# Patient Record
Sex: MALE | Race: OTHER | HISPANIC OR LATINO
[De-identification: names, ages, dates, MRNs, and addresses within clinical notes are randomized per-mention and may not be internally consistent; named-entity substitution may affect disease eponyms.]

---

## 2017-01-25 ENCOUNTER — APPOINTMENT (OUTPATIENT)
Dept: CARDIOLOGY | Facility: CLINIC | Age: 37
End: 2017-01-25

## 2017-03-29 ENCOUNTER — INPATIENT (INPATIENT)
Facility: HOSPITAL | Age: 37
LOS: 0 days | Discharge: HOME | DRG: 446 | End: 2017-03-30
Attending: HOSPITALIST | Admitting: HOSPITALIST
Payer: COMMERCIAL

## 2017-03-29 DIAGNOSIS — K76.0 FATTY (CHANGE OF) LIVER, NOT ELSEWHERE CLASSIFIED: ICD-10-CM

## 2017-03-29 DIAGNOSIS — M54.9 DORSALGIA, UNSPECIFIED: ICD-10-CM

## 2017-03-29 DIAGNOSIS — R10.9 UNSPECIFIED ABDOMINAL PAIN: ICD-10-CM

## 2017-03-29 DIAGNOSIS — K80.20 CALCULUS OF GALLBLADDER WITHOUT CHOLECYSTITIS WITHOUT OBSTRUCTION: ICD-10-CM

## 2017-03-29 DIAGNOSIS — Z88.8 ALLERGY STATUS TO OTHER DRUGS, MEDICAMENTS AND BIOLOGICAL SUBSTANCES: ICD-10-CM

## 2017-03-29 PROCEDURE — 76705 ECHO EXAM OF ABDOMEN: CPT | Mod: 26

## 2017-03-29 PROCEDURE — 99223 1ST HOSP IP/OBS HIGH 75: CPT

## 2017-03-30 PROCEDURE — 96376 TX/PRO/DX INJ SAME DRUG ADON: CPT

## 2017-03-30 PROCEDURE — 85027 COMPLETE CBC AUTOMATED: CPT

## 2017-03-30 PROCEDURE — 85610 PROTHROMBIN TIME: CPT

## 2017-03-30 PROCEDURE — 96365 THER/PROPH/DIAG IV INF INIT: CPT

## 2017-03-30 PROCEDURE — 80048 BASIC METABOLIC PNL TOTAL CA: CPT

## 2017-03-30 PROCEDURE — 96361 HYDRATE IV INFUSION ADD-ON: CPT

## 2017-03-30 PROCEDURE — 99239 HOSP IP/OBS DSCHRG MGMT >30: CPT

## 2017-03-30 PROCEDURE — 96375 TX/PRO/DX INJ NEW DRUG ADDON: CPT

## 2017-03-30 PROCEDURE — 85730 THROMBOPLASTIN TIME PARTIAL: CPT

## 2017-03-30 PROCEDURE — 99285 EMERGENCY DEPT VISIT HI MDM: CPT | Mod: 25

## 2017-03-30 PROCEDURE — 76705 ECHO EXAM OF ABDOMEN: CPT

## 2017-03-30 PROCEDURE — 80053 COMPREHEN METABOLIC PANEL: CPT

## 2017-03-30 PROCEDURE — 83690 ASSAY OF LIPASE: CPT

## 2017-10-02 ENCOUNTER — EMERGENCY (EMERGENCY)
Facility: HOSPITAL | Age: 37
LOS: 1 days | Discharge: HOME | End: 2017-10-02
Admitting: INTERNAL MEDICINE
Payer: COMMERCIAL

## 2017-10-02 DIAGNOSIS — K80.20 CALCULUS OF GALLBLADDER WITHOUT CHOLECYSTITIS WITHOUT OBSTRUCTION: ICD-10-CM

## 2017-10-02 DIAGNOSIS — R10.13 EPIGASTRIC PAIN: ICD-10-CM

## 2017-10-02 DIAGNOSIS — Z88.8 ALLERGY STATUS TO OTHER DRUGS, MEDICAMENTS AND BIOLOGICAL SUBSTANCES: ICD-10-CM

## 2017-10-02 PROCEDURE — 76705 ECHO EXAM OF ABDOMEN: CPT | Mod: 26

## 2017-10-02 PROCEDURE — 71010: CPT | Mod: 26

## 2017-10-02 PROCEDURE — 99285 EMERGENCY DEPT VISIT HI MDM: CPT

## 2017-10-03 PROCEDURE — 99284 EMERGENCY DEPT VISIT MOD MDM: CPT | Mod: 25

## 2017-10-03 PROCEDURE — 82150 ASSAY OF AMYLASE: CPT

## 2017-10-03 PROCEDURE — 85730 THROMBOPLASTIN TIME PARTIAL: CPT

## 2017-10-03 PROCEDURE — 80048 BASIC METABOLIC PNL TOTAL CA: CPT

## 2017-10-03 PROCEDURE — 83690 ASSAY OF LIPASE: CPT

## 2017-10-03 PROCEDURE — 71045 X-RAY EXAM CHEST 1 VIEW: CPT

## 2017-10-03 PROCEDURE — 85027 COMPLETE CBC AUTOMATED: CPT

## 2017-10-03 PROCEDURE — 96375 TX/PRO/DX INJ NEW DRUG ADDON: CPT

## 2017-10-03 PROCEDURE — 96374 THER/PROPH/DIAG INJ IV PUSH: CPT

## 2017-10-03 PROCEDURE — 80076 HEPATIC FUNCTION PANEL: CPT

## 2017-10-03 PROCEDURE — 85610 PROTHROMBIN TIME: CPT

## 2017-10-03 PROCEDURE — 76705 ECHO EXAM OF ABDOMEN: CPT

## 2018-05-22 ENCOUNTER — EMERGENCY (EMERGENCY)
Facility: HOSPITAL | Age: 38
LOS: 1 days | Discharge: HOME | End: 2018-05-22
Admitting: EMERGENCY MEDICINE
Payer: COMMERCIAL

## 2018-05-22 DIAGNOSIS — Z87.19 PERSONAL HISTORY OF OTHER DISEASES OF THE DIGESTIVE SYSTEM: ICD-10-CM

## 2018-05-22 DIAGNOSIS — K80.70 CALCULUS OF GALLBLADDER AND BILE DUCT WITHOUT CHOLECYSTITIS WITHOUT OBSTRUCTION: ICD-10-CM

## 2018-05-22 DIAGNOSIS — R10.11 RIGHT UPPER QUADRANT PAIN: ICD-10-CM

## 2018-05-22 PROCEDURE — 99284 EMERGENCY DEPT VISIT MOD MDM: CPT | Mod: 25

## 2018-05-22 PROCEDURE — 99285 EMERGENCY DEPT VISIT HI MDM: CPT

## 2018-05-22 PROCEDURE — 83690 ASSAY OF LIPASE: CPT

## 2018-05-22 PROCEDURE — 80076 HEPATIC FUNCTION PANEL: CPT

## 2018-05-22 PROCEDURE — 96374 THER/PROPH/DIAG INJ IV PUSH: CPT

## 2018-05-22 PROCEDURE — 96376 TX/PRO/DX INJ SAME DRUG ADON: CPT

## 2018-05-22 PROCEDURE — 82150 ASSAY OF AMYLASE: CPT

## 2018-05-22 PROCEDURE — 80048 BASIC METABOLIC PNL TOTAL CA: CPT

## 2018-05-22 PROCEDURE — 96375 TX/PRO/DX INJ NEW DRUG ADDON: CPT

## 2018-05-22 PROCEDURE — 76705 ECHO EXAM OF ABDOMEN: CPT | Mod: 26

## 2018-05-22 PROCEDURE — 76705 ECHO EXAM OF ABDOMEN: CPT

## 2018-05-22 PROCEDURE — 96361 HYDRATE IV INFUSION ADD-ON: CPT

## 2018-05-22 PROCEDURE — 85027 COMPLETE CBC AUTOMATED: CPT

## 2018-08-11 ENCOUNTER — EMERGENCY (EMERGENCY)
Facility: HOSPITAL | Age: 38
LOS: 1 days | Discharge: HOME | End: 2018-08-11
Attending: EMERGENCY MEDICINE | Admitting: EMERGENCY MEDICINE
Payer: SELF-PAY

## 2018-08-11 VITALS
HEART RATE: 77 BPM | OXYGEN SATURATION: 99 % | TEMPERATURE: 98 F | SYSTOLIC BLOOD PRESSURE: 164 MMHG | RESPIRATION RATE: 16 BRPM | DIASTOLIC BLOOD PRESSURE: 96 MMHG | WEIGHT: 199.96 LBS

## 2018-08-11 DIAGNOSIS — R10.9 UNSPECIFIED ABDOMINAL PAIN: ICD-10-CM

## 2018-08-11 LAB
ALBUMIN SERPL ELPH-MCNC: 3.6 G/DL — SIGNIFICANT CHANGE UP (ref 3.3–5)
ALP SERPL-CCNC: 140 U/L — HIGH (ref 40–120)
ALT FLD-CCNC: 46 U/L DA — HIGH (ref 10–45)
ANION GAP SERPL CALC-SCNC: 3 MMOL/L — LOW (ref 5–17)
AST SERPL-CCNC: 25 U/L — SIGNIFICANT CHANGE UP (ref 10–40)
BASOPHILS # BLD AUTO: 0.1 K/UL — SIGNIFICANT CHANGE UP (ref 0–0.2)
BASOPHILS NFR BLD AUTO: 0.8 % — SIGNIFICANT CHANGE UP (ref 0–2)
BILIRUB SERPL-MCNC: 0.1 MG/DL — LOW (ref 0.2–1.2)
BUN SERPL-MCNC: 17 MG/DL — SIGNIFICANT CHANGE UP (ref 7–23)
CALCIUM SERPL-MCNC: 8.5 MG/DL — SIGNIFICANT CHANGE UP (ref 8.4–10.5)
CHLORIDE SERPL-SCNC: 105 MMOL/L — SIGNIFICANT CHANGE UP (ref 96–108)
CO2 SERPL-SCNC: 27 MMOL/L — SIGNIFICANT CHANGE UP (ref 22–31)
CREAT SERPL-MCNC: 0.68 MG/DL — SIGNIFICANT CHANGE UP (ref 0.5–1.3)
EOSINOPHIL # BLD AUTO: 0.2 K/UL — SIGNIFICANT CHANGE UP (ref 0–0.5)
EOSINOPHIL NFR BLD AUTO: 3.2 % — SIGNIFICANT CHANGE UP (ref 0–6)
GLUCOSE SERPL-MCNC: 162 MG/DL — HIGH (ref 70–99)
HCT VFR BLD CALC: 42.7 % — SIGNIFICANT CHANGE UP (ref 39–50)
HGB BLD-MCNC: 13.6 G/DL — SIGNIFICANT CHANGE UP (ref 13–17)
LIDOCAIN IGE QN: 120 U/L — SIGNIFICANT CHANGE UP (ref 73–393)
LYMPHOCYTES # BLD AUTO: 1.7 K/UL — SIGNIFICANT CHANGE UP (ref 1–3.3)
LYMPHOCYTES # BLD AUTO: 21.4 % — SIGNIFICANT CHANGE UP (ref 13–44)
MCHC RBC-ENTMCNC: 27.1 PG — SIGNIFICANT CHANGE UP (ref 27–34)
MCHC RBC-ENTMCNC: 32 GM/DL — SIGNIFICANT CHANGE UP (ref 32–36)
MCV RBC AUTO: 84.7 FL — SIGNIFICANT CHANGE UP (ref 80–100)
MONOCYTES # BLD AUTO: 0.5 K/UL — SIGNIFICANT CHANGE UP (ref 0–0.9)
MONOCYTES NFR BLD AUTO: 6.5 % — SIGNIFICANT CHANGE UP (ref 1–9)
NEUTROPHILS # BLD AUTO: 5.3 K/UL — SIGNIFICANT CHANGE UP (ref 1.8–7.4)
NEUTROPHILS NFR BLD AUTO: 68 % — SIGNIFICANT CHANGE UP (ref 43–77)
PLATELET # BLD AUTO: 300 K/UL — SIGNIFICANT CHANGE UP (ref 150–400)
POTASSIUM SERPL-MCNC: 3.9 MMOL/L — SIGNIFICANT CHANGE UP (ref 3.5–5.3)
POTASSIUM SERPL-SCNC: 3.9 MMOL/L — SIGNIFICANT CHANGE UP (ref 3.5–5.3)
PROT SERPL-MCNC: 8.2 G/DL — SIGNIFICANT CHANGE UP (ref 6–8.3)
RBC # BLD: 5.04 M/UL — SIGNIFICANT CHANGE UP (ref 4.2–5.8)
RBC # FLD: 12.9 % — SIGNIFICANT CHANGE UP (ref 10.3–14.5)
SODIUM SERPL-SCNC: 135 MMOL/L — SIGNIFICANT CHANGE UP (ref 135–145)
WBC # BLD: 7.8 K/UL — SIGNIFICANT CHANGE UP (ref 3.8–10.5)
WBC # FLD AUTO: 7.8 K/UL — SIGNIFICANT CHANGE UP (ref 3.8–10.5)

## 2018-08-11 PROCEDURE — 99285 EMERGENCY DEPT VISIT HI MDM: CPT

## 2018-08-11 PROCEDURE — 76705 ECHO EXAM OF ABDOMEN: CPT | Mod: 26

## 2018-08-11 RX ORDER — ONDANSETRON 8 MG/1
4 TABLET, FILM COATED ORAL ONCE
Qty: 0 | Refills: 0 | Status: COMPLETED | OUTPATIENT
Start: 2018-08-11 | End: 2018-08-11

## 2018-08-11 RX ORDER — HYDROMORPHONE HYDROCHLORIDE 2 MG/ML
1 INJECTION INTRAMUSCULAR; INTRAVENOUS; SUBCUTANEOUS ONCE
Qty: 0 | Refills: 0 | Status: DISCONTINUED | OUTPATIENT
Start: 2018-08-11 | End: 2018-08-11

## 2018-08-11 RX ORDER — SODIUM CHLORIDE 9 MG/ML
1000 INJECTION INTRAMUSCULAR; INTRAVENOUS; SUBCUTANEOUS ONCE
Qty: 0 | Refills: 0 | Status: COMPLETED | OUTPATIENT
Start: 2018-08-11 | End: 2018-08-11

## 2018-08-11 RX ORDER — MORPHINE SULFATE 50 MG/1
4 CAPSULE, EXTENDED RELEASE ORAL ONCE
Qty: 0 | Refills: 0 | Status: DISCONTINUED | OUTPATIENT
Start: 2018-08-11 | End: 2018-08-11

## 2018-08-11 RX ORDER — FAMOTIDINE 10 MG/ML
20 INJECTION INTRAVENOUS DAILY
Qty: 0 | Refills: 0 | Status: DISCONTINUED | OUTPATIENT
Start: 2018-08-11 | End: 2018-08-15

## 2018-08-11 RX ORDER — SODIUM CHLORIDE 9 MG/ML
3 INJECTION INTRAMUSCULAR; INTRAVENOUS; SUBCUTANEOUS ONCE
Qty: 0 | Refills: 0 | Status: COMPLETED | OUTPATIENT
Start: 2018-08-11 | End: 2018-08-11

## 2018-08-11 RX ADMIN — MORPHINE SULFATE 4 MILLIGRAM(S): 50 CAPSULE, EXTENDED RELEASE ORAL at 20:18

## 2018-08-11 RX ADMIN — ONDANSETRON 104 MILLIGRAM(S): 8 TABLET, FILM COATED ORAL at 20:23

## 2018-08-11 RX ADMIN — HYDROMORPHONE HYDROCHLORIDE 1 MILLIGRAM(S): 2 INJECTION INTRAMUSCULAR; INTRAVENOUS; SUBCUTANEOUS at 22:45

## 2018-08-11 RX ADMIN — SODIUM CHLORIDE 1000 MILLILITER(S): 9 INJECTION INTRAMUSCULAR; INTRAVENOUS; SUBCUTANEOUS at 21:02

## 2018-08-11 RX ADMIN — ONDANSETRON 4 MILLIGRAM(S): 8 TABLET, FILM COATED ORAL at 20:33

## 2018-08-11 RX ADMIN — HYDROMORPHONE HYDROCHLORIDE 1 MILLIGRAM(S): 2 INJECTION INTRAMUSCULAR; INTRAVENOUS; SUBCUTANEOUS at 23:15

## 2018-08-11 RX ADMIN — MORPHINE SULFATE 4 MILLIGRAM(S): 50 CAPSULE, EXTENDED RELEASE ORAL at 20:48

## 2018-08-11 RX ADMIN — MORPHINE SULFATE 4 MILLIGRAM(S): 50 CAPSULE, EXTENDED RELEASE ORAL at 22:05

## 2018-08-11 RX ADMIN — MORPHINE SULFATE 4 MILLIGRAM(S): 50 CAPSULE, EXTENDED RELEASE ORAL at 21:35

## 2018-08-11 RX ADMIN — SODIUM CHLORIDE 3 MILLILITER(S): 9 INJECTION INTRAMUSCULAR; INTRAVENOUS; SUBCUTANEOUS at 20:01

## 2018-08-11 RX ADMIN — FAMOTIDINE 100 MILLIGRAM(S): 10 INJECTION INTRAVENOUS at 20:20

## 2018-08-11 RX ADMIN — FAMOTIDINE 20 MILLIGRAM(S): 10 INJECTION INTRAVENOUS at 20:40

## 2018-08-11 RX ADMIN — SODIUM CHLORIDE 500 MILLILITER(S): 9 INJECTION INTRAMUSCULAR; INTRAVENOUS; SUBCUTANEOUS at 20:02

## 2018-08-11 NOTE — ED ADULT NURSE NOTE - OBJECTIVE STATEMENT
Pt c/o upper abd pain for 2-3 hours PTA s/p eating fried eggs and rice. Pt states that he has had this pain in the past and was told that he needs to get his gallbladder removed. Pt also c/o vomiting after taking a percacet for the pain.

## 2018-08-11 NOTE — ED ADULT NURSE NOTE - NSIMPLEMENTINTERV_GEN_ALL_ED
Implemented All Universal Safety Interventions:  [place] to call system. Call bell, personal items and telephone within reach. Instruct patient to call for assistance. Room bathroom lighting operational. Non-slip footwear when patient is off stretcher. Physically safe environment: no spills, clutter or unnecessary equipment. Stretcher in lowest position, wheels locked, appropriate side rails in place.

## 2018-08-12 ENCOUNTER — TRANSCRIPTION ENCOUNTER (OUTPATIENT)
Age: 38
End: 2018-08-12

## 2018-08-12 ENCOUNTER — INPATIENT (INPATIENT)
Facility: HOSPITAL | Age: 38
LOS: 5 days | Discharge: HOME | DRG: 872 | End: 2018-08-18
Attending: INTERNAL MEDICINE | Admitting: INTERNAL MEDICINE
Payer: COMMERCIAL

## 2018-08-12 VITALS
RESPIRATION RATE: 17 BRPM | OXYGEN SATURATION: 96 % | SYSTOLIC BLOOD PRESSURE: 125 MMHG | HEART RATE: 106 BPM | WEIGHT: 227.08 LBS | TEMPERATURE: 103 F | DIASTOLIC BLOOD PRESSURE: 73 MMHG

## 2018-08-12 VITALS
HEART RATE: 54 BPM | RESPIRATION RATE: 16 BRPM | OXYGEN SATURATION: 99 % | DIASTOLIC BLOOD PRESSURE: 80 MMHG | SYSTOLIC BLOOD PRESSURE: 120 MMHG

## 2018-08-12 DIAGNOSIS — K80.20 CALCULUS OF GALLBLADDER WITHOUT CHOLECYSTITIS WITHOUT OBSTRUCTION: ICD-10-CM

## 2018-08-12 DIAGNOSIS — K80.10 CALCULUS OF GALLBLADDER WITH CHRONIC CHOLECYSTITIS WITHOUT OBSTRUCTION: ICD-10-CM

## 2018-08-12 LAB
ALBUMIN SERPL ELPH-MCNC: 3.6 G/DL — SIGNIFICANT CHANGE UP (ref 3.3–5)
ALP SERPL-CCNC: 123 U/L — HIGH (ref 40–120)
ALT FLD-CCNC: 57 U/L DA — HIGH (ref 10–45)
ANION GAP SERPL CALC-SCNC: 10 MMOL/L — SIGNIFICANT CHANGE UP (ref 5–17)
APPEARANCE UR: CLEAR — SIGNIFICANT CHANGE UP
APTT BLD: 27.8 SEC — SIGNIFICANT CHANGE UP (ref 27.5–37.4)
AST SERPL-CCNC: 32 U/L — SIGNIFICANT CHANGE UP (ref 10–40)
BACTERIA # UR AUTO: NEGATIVE /HPF — SIGNIFICANT CHANGE UP
BASOPHILS # BLD AUTO: 0 K/UL — SIGNIFICANT CHANGE UP (ref 0–0.2)
BASOPHILS NFR BLD AUTO: 0.3 % — SIGNIFICANT CHANGE UP (ref 0–2)
BILIRUB SERPL-MCNC: 0.7 MG/DL — SIGNIFICANT CHANGE UP (ref 0.2–1.2)
BILIRUB UR-MCNC: NEGATIVE — SIGNIFICANT CHANGE UP
BUN SERPL-MCNC: 14 MG/DL — SIGNIFICANT CHANGE UP (ref 7–23)
CALCIUM SERPL-MCNC: 9 MG/DL — SIGNIFICANT CHANGE UP (ref 8.4–10.5)
CHLORIDE SERPL-SCNC: 99 MMOL/L — SIGNIFICANT CHANGE UP (ref 96–108)
CO2 SERPL-SCNC: 27 MMOL/L — SIGNIFICANT CHANGE UP (ref 22–31)
COLOR SPEC: YELLOW — SIGNIFICANT CHANGE UP
CREAT SERPL-MCNC: 0.7 MG/DL — SIGNIFICANT CHANGE UP (ref 0.5–1.3)
DIFF PNL FLD: ABNORMAL
EOSINOPHIL # BLD AUTO: 0 K/UL — SIGNIFICANT CHANGE UP (ref 0–0.5)
EOSINOPHIL NFR BLD AUTO: 0.1 % — SIGNIFICANT CHANGE UP (ref 0–6)
EPI CELLS # UR: SIGNIFICANT CHANGE UP
GLUCOSE SERPL-MCNC: 109 MG/DL — HIGH (ref 70–99)
GLUCOSE UR QL: NEGATIVE — SIGNIFICANT CHANGE UP
HCT VFR BLD CALC: 42.7 % — SIGNIFICANT CHANGE UP (ref 39–50)
HGB BLD-MCNC: 14 G/DL — SIGNIFICANT CHANGE UP (ref 13–17)
INR BLD: 1.19 RATIO — HIGH (ref 0.88–1.16)
KETONES UR-MCNC: NEGATIVE — SIGNIFICANT CHANGE UP
LACTATE SERPL-SCNC: 2 MMOL/L — SIGNIFICANT CHANGE UP (ref 0.7–2)
LEUKOCYTE ESTERASE UR-ACNC: NEGATIVE — SIGNIFICANT CHANGE UP
LIDOCAIN IGE QN: 74 U/L — SIGNIFICANT CHANGE UP (ref 73–393)
LYMPHOCYTES # BLD AUTO: 0.4 K/UL — LOW (ref 1–3.3)
LYMPHOCYTES # BLD AUTO: 3.1 % — LOW (ref 13–44)
MCHC RBC-ENTMCNC: 27.5 PG — SIGNIFICANT CHANGE UP (ref 27–34)
MCHC RBC-ENTMCNC: 32.9 GM/DL — SIGNIFICANT CHANGE UP (ref 32–36)
MCV RBC AUTO: 83.4 FL — SIGNIFICANT CHANGE UP (ref 80–100)
MONOCYTES # BLD AUTO: 0.1 K/UL — SIGNIFICANT CHANGE UP (ref 0–0.9)
MONOCYTES NFR BLD AUTO: 1.1 % — SIGNIFICANT CHANGE UP (ref 1–9)
NEUTROPHILS # BLD AUTO: 13 K/UL — HIGH (ref 1.8–7.4)
NEUTROPHILS NFR BLD AUTO: 95.4 % — HIGH (ref 43–77)
NITRITE UR-MCNC: NEGATIVE — SIGNIFICANT CHANGE UP
PH UR: 6 — SIGNIFICANT CHANGE UP (ref 5–8)
PLATELET # BLD AUTO: 283 K/UL — SIGNIFICANT CHANGE UP (ref 150–400)
POTASSIUM SERPL-MCNC: 3.6 MMOL/L — SIGNIFICANT CHANGE UP (ref 3.5–5.3)
POTASSIUM SERPL-SCNC: 3.6 MMOL/L — SIGNIFICANT CHANGE UP (ref 3.5–5.3)
PROT SERPL-MCNC: 8.6 G/DL — HIGH (ref 6–8.3)
PROT UR-MCNC: 30 MG/DL
PROTHROM AB SERPL-ACNC: 13.2 SEC — HIGH (ref 9.8–12.7)
RBC # BLD: 5.11 M/UL — SIGNIFICANT CHANGE UP (ref 4.2–5.8)
RBC # FLD: 12.5 % — SIGNIFICANT CHANGE UP (ref 10.3–14.5)
RBC CASTS # UR COMP ASSIST: SIGNIFICANT CHANGE UP /HPF (ref 0–4)
SODIUM SERPL-SCNC: 136 MMOL/L — SIGNIFICANT CHANGE UP (ref 135–145)
SP GR SPEC: 1 — LOW (ref 1.01–1.02)
UROBILINOGEN FLD QL: NEGATIVE — SIGNIFICANT CHANGE UP
WBC # BLD: 13.6 K/UL — HIGH (ref 3.8–10.5)
WBC # FLD AUTO: 13.6 K/UL — HIGH (ref 3.8–10.5)
WBC UR QL: SIGNIFICANT CHANGE UP /HPF (ref 0–5)

## 2018-08-12 PROCEDURE — 76705 ECHO EXAM OF ABDOMEN: CPT

## 2018-08-12 PROCEDURE — 96376 TX/PRO/DX INJ SAME DRUG ADON: CPT

## 2018-08-12 PROCEDURE — 80053 COMPREHEN METABOLIC PANEL: CPT

## 2018-08-12 PROCEDURE — 96375 TX/PRO/DX INJ NEW DRUG ADDON: CPT

## 2018-08-12 PROCEDURE — 85027 COMPLETE CBC AUTOMATED: CPT

## 2018-08-12 PROCEDURE — 93010 ELECTROCARDIOGRAM REPORT: CPT

## 2018-08-12 PROCEDURE — 74177 CT ABD & PELVIS W/CONTRAST: CPT | Mod: 26

## 2018-08-12 PROCEDURE — 96365 THER/PROPH/DIAG IV INF INIT: CPT | Mod: XU

## 2018-08-12 PROCEDURE — 99285 EMERGENCY DEPT VISIT HI MDM: CPT

## 2018-08-12 PROCEDURE — 99223 1ST HOSP IP/OBS HIGH 75: CPT

## 2018-08-12 PROCEDURE — 74177 CT ABD & PELVIS W/CONTRAST: CPT

## 2018-08-12 PROCEDURE — 83690 ASSAY OF LIPASE: CPT

## 2018-08-12 PROCEDURE — 71045 X-RAY EXAM CHEST 1 VIEW: CPT | Mod: 26

## 2018-08-12 PROCEDURE — 96361 HYDRATE IV INFUSION ADD-ON: CPT

## 2018-08-12 PROCEDURE — 99284 EMERGENCY DEPT VISIT MOD MDM: CPT | Mod: 25

## 2018-08-12 RX ORDER — ACETAMINOPHEN 500 MG
650 TABLET ORAL ONCE
Qty: 0 | Refills: 0 | Status: COMPLETED | OUTPATIENT
Start: 2018-08-12 | End: 2018-08-12

## 2018-08-12 RX ORDER — PIPERACILLIN AND TAZOBACTAM 4; .5 G/20ML; G/20ML
3.38 INJECTION, POWDER, LYOPHILIZED, FOR SOLUTION INTRAVENOUS ONCE
Qty: 0 | Refills: 0 | Status: COMPLETED | OUTPATIENT
Start: 2018-08-12 | End: 2018-08-12

## 2018-08-12 RX ORDER — SODIUM CHLORIDE 9 MG/ML
3100 INJECTION INTRAMUSCULAR; INTRAVENOUS; SUBCUTANEOUS ONCE
Qty: 0 | Refills: 0 | Status: COMPLETED | OUTPATIENT
Start: 2018-08-12 | End: 2018-08-12

## 2018-08-12 RX ORDER — PIPERACILLIN AND TAZOBACTAM 4; .5 G/20ML; G/20ML
3.38 INJECTION, POWDER, LYOPHILIZED, FOR SOLUTION INTRAVENOUS EVERY 8 HOURS
Qty: 0 | Refills: 0 | Status: DISCONTINUED | OUTPATIENT
Start: 2018-08-12 | End: 2018-08-14

## 2018-08-12 RX ORDER — SODIUM CHLORIDE 9 MG/ML
1000 INJECTION INTRAMUSCULAR; INTRAVENOUS; SUBCUTANEOUS
Qty: 0 | Refills: 0 | Status: DISCONTINUED | OUTPATIENT
Start: 2018-08-12 | End: 2018-08-14

## 2018-08-12 RX ORDER — MORPHINE SULFATE 50 MG/1
4 CAPSULE, EXTENDED RELEASE ORAL ONCE
Qty: 0 | Refills: 0 | Status: DISCONTINUED | OUTPATIENT
Start: 2018-08-12 | End: 2018-08-12

## 2018-08-12 RX ORDER — ACETAMINOPHEN 500 MG
650 TABLET ORAL EVERY 6 HOURS
Qty: 0 | Refills: 0 | Status: DISCONTINUED | OUTPATIENT
Start: 2018-08-12 | End: 2018-08-18

## 2018-08-12 RX ADMIN — SODIUM CHLORIDE 3100 MILLILITER(S): 9 INJECTION INTRAMUSCULAR; INTRAVENOUS; SUBCUTANEOUS at 21:40

## 2018-08-12 RX ADMIN — PIPERACILLIN AND TAZOBACTAM 3.38 GRAM(S): 4; .5 INJECTION, POWDER, LYOPHILIZED, FOR SOLUTION INTRAVENOUS at 20:30

## 2018-08-12 RX ADMIN — MORPHINE SULFATE 4 MILLIGRAM(S): 50 CAPSULE, EXTENDED RELEASE ORAL at 07:42

## 2018-08-12 RX ADMIN — PIPERACILLIN AND TAZOBACTAM 200 GRAM(S): 4; .5 INJECTION, POWDER, LYOPHILIZED, FOR SOLUTION INTRAVENOUS at 20:16

## 2018-08-12 RX ADMIN — SODIUM CHLORIDE 3100 MILLILITER(S): 9 INJECTION INTRAMUSCULAR; INTRAVENOUS; SUBCUTANEOUS at 20:10

## 2018-08-12 RX ADMIN — Medication 650 MILLIGRAM(S): at 20:15

## 2018-08-12 NOTE — H&P ADULT - PROBLEM SELECTOR PROBLEM 1
Calculus of gallbladder with cholecystitis without biliary obstruction, unspecified cholecystitis acuity

## 2018-08-12 NOTE — ED PROVIDER NOTE - MEDICAL DECISION MAKING DETAILS
Pt with persistent epigastric pain with nausea with a history of biliary colic.  Pt given the option of being admitted and declined Dr. Garth Patel to see  Pt told to return for fever or increasing pain

## 2018-08-12 NOTE — H&P ADULT - HISTORY OF PRESENT ILLNESS
38M hx of cholelithiasis pw sxs of biliary colic yesterday and was in ED and discharged this am. He developed abd pain and decreased appetite and vomiting started yesterday afternoon. In ED had workup with abd U/S and CT abd with fatty liver and cholelithiasis. Sxs improved and pt was discharged. After D/C home, no further abd pain but developed shaking chills and fever and came back to ED. Denied any new cough, NVD or dysuria.

## 2018-08-12 NOTE — H&P ADULT - NSHPPHYSICALEXAM_GEN_ALL_CORE
Vital Signs Last 24 Hrs  T(C): 37.9 (12 Aug 2018 21:35), Max: 39.2 (12 Aug 2018 19:43)  T(F): 100.2 (12 Aug 2018 21:35), Max: 102.5 (12 Aug 2018 19:43)  HR: 101 (12 Aug 2018 21:35) (50 - 106)  BP: 124/74 (12 Aug 2018 21:35) (120/80 - 125/73)  BP(mean): --  RR: 17 (12 Aug 2018 21:35) (16 - 18)  SpO2: 95% (12 Aug 2018 21:35) (95% - 100%)  Daily     Daily   CAPILLARY BLOOD GLUCOSE        I&O's Summary      GENERAL: NAD  HEAD:  Normocephalic  EYES: EOMI, PERRLA, conjunctiva and sclera clear  ENMT: No tonsillar erythema, exudates, or enlargement; Moist mucous membranes, No lesions  NECK: Supple, No JVD, no bruit, normal thyroid  NERVOUS SYSTEM:  Alert & Oriented X3, Good concentration; grossly  Motor Strength 5/5 B/L upper and lower extremities; DTRs 2+ intact and symmetric  CHEST/LUNG: Clear to auscultation bilaterally; No rales, rhonchi, wheezing, or rubs  HEART: Regular rate and rhythm; No murmurs, rubs, or gallops  ABDOMEN: Soft, Nontender, Nondistended; Bowel sounds present  EXTREMITIES:  2+ Peripheral Pulses, No clubbing, cyanosis, or edema  LYMPH: No lymphadenopathy noted  SKIN: No rashes or lesions

## 2018-08-12 NOTE — H&P ADULT - NSHPREVIEWOFSYSTEMS_GEN_ALL_CORE
CONSTITUTIONAL: No fever, weight loss, or fatigue  EYES: No eye pain, visual disturbances, or discharge  ENMT:  No difficulty hearing, tinnitus, vertigo; No sinus or throat pain  NECK: No pain or stiffness  RESPIRATORY: No cough, wheezing, chills or hemoptysis; No shortness of breath  CARDIOVASCULAR: No chest pain, palpitations, dizziness, or leg swelling  GASTROINTESTINAL: No abdominal or epigastric pain. No nausea, vomiting, or hematemesis; No diarrhea or constipation. No melena or hematochezia.  GENITOURINARY: No dysuria, frequency, hematuria, or incontinence  NEUROLOGICAL: No headaches, memory loss, loss of strength, numbness, or tremors  SKIN: No itching, burning, rashes, or lesions   LYMPH NODES: No enlarged glands  ENDOCRINE: No heat or cold intolerance; No hair loss  MUSCULOSKELETAL: No joint pain or swelling; No muscle, back, or extremity pain  PSYCHIATRIC: No depression, anxiety, mood swings, or difficulty sleeping  HEME/LYMPH: No easy bruising, or bleeding gums  ALLERY AND IMMUNOLOGIC: No hives or eczema    IMPROVE VTE Individual Risk Assessment          RISK                                                          Points  [  ] Previous VTE                                                3  [  ] Thrombophilia                                             2  [  ] Lower limb paralysis                                   2        (unable to hold up >15 seconds)    [  ] Current Cancer                                             2         (within 6 months)  [  ] Immobilization > 24 hrs                              1  [  ] ICU/CCU stay > 24 hours                             1  [  ] Age > 60                                                         1    IMPROVE VTE Score:         [     0    ]    Total Risk Factor Score:    0 - 1:   Consider IPC  >2 - 3:  Thromboprophylaxis required (enoxaparin or SQ heparin)        >4:   High Risk: Thromboprophylaxis required (enoxaparin or SQ heparin), optional add IPC  **If CONTRAINDICATION to enoxaparin or SQ heparin, USE IPCs**

## 2018-08-12 NOTE — H&P ADULT - NSHPLABSRESULTS_GEN_ALL_CORE
14.0   13.6  )-----------( 283      ( 12 Aug 2018 20:14 )             42.7           136  |  99  |  14  ----------------------------<  109<H>  3.6   |  27  |  0.70    Ca    9.0      12 Aug 2018 20:14    TPro  8.6<H>  /  Alb  3.6  /  TBili  0.7  /  DBili  x   /  AST  32  /  ALT  57<H>  /  AlkPhos  123<H>      Lactate, Blood: 2.0 mmol/L ( @ 20:00)       LIVER FUNCTIONS - ( 12 Aug 2018 20:14 )  Alb: 3.6 g/dL / Pro: 8.6 g/dL / ALK PHOS: 123 U/L / ALT: 57 U/L DA / AST: 32 U/L / GGT: x             Lipase, Serum: 74 U/L (18 @ 20:14)  Lipase, Serum: 120 U/L (18 @ 20:07)    PT/INR - ( 12 Aug 2018 20:14 )   PT: 13.2 sec;   INR: 1.19 ratio         PTT - ( 12 Aug 2018 20:14 )  PTT:27.8 sec          Urinalysis Basic - ( 12 Aug 2018 21:13 )    Color: Yellow / Appearance: Clear / S.005 / pH: x  Gluc: x / Ketone: Negative  / Bili: Negative / Urobili: Negative   Blood: x / Protein: 30 mg/dL / Nitrite: Negative   Leuk Esterase: Negative / RBC: 0-4 /HPF / WBC 0-2 /HPF   Sq Epi: x / Non Sq Epi: Neg.-Few / Bacteria: Negative /HPF      EKG:      CXR: 14.0   13.6  )-----------( 283      ( 12 Aug 2018 20:14 )             42.7           136  |  99  |  14  ----------------------------<  109<H>  3.6   |  27  |  0.70    Ca    9.0      12 Aug 2018 20:14    TPro  8.6<H>  /  Alb  3.6  /  TBili  0.7  /  DBili  x   /  AST  32  /  ALT  57<H>  /  AlkPhos  123<H>      Lactate, Blood: 2.0 mmol/L ( @ 20:00)       LIVER FUNCTIONS - ( 12 Aug 2018 20:14 )  Alb: 3.6 g/dL / Pro: 8.6 g/dL / ALK PHOS: 123 U/L / ALT: 57 U/L DA / AST: 32 U/L / GGT: x             Lipase, Serum: 74 U/L (18 @ 20:14)  Lipase, Serum: 120 U/L (18 @ 20:07)    PT/INR - ( 12 Aug 2018 20:14 )   PT: 13.2 sec;   INR: 1.19 ratio         PTT - ( 12 Aug 2018 20:14 )  PTT:27.8 sec          Urinalysis Basic - ( 12 Aug 2018 21:13 )    Color: Yellow / Appearance: Clear / S.005 / pH: x  Gluc: x / Ketone: Negative  / Bili: Negative / Urobili: Negative   Blood: x / Protein: 30 mg/dL / Nitrite: Negative   Leuk Esterase: Negative / RBC: 0-4 /HPF / WBC 0-2 /HPF   Sq Epi: x / Non Sq Epi: Neg.-Few / Bacteria: Negative /HPF    < from: CT Abdomen and Pelvis w/ Oral Cont and w/ IV Cont (18 @ 03:23) >    FINDINGS:    The heart and lung bases are unremarkable.    The spleen, pancreas, gallbladder and biliary tree are unremarkable. The   liver is normal in morphology. There is diffuse hepatic steatosis. The   portal vein is patent.    The kidneys enhance symmetrically without hydronephrosis. The adrenal   glands are unremarkable.    There is no bowel obstruction or ascites. The appendix is normal.    The urinary bladder is unremarkable. There is no pelvic mass.    There is no abdominal or pelvic lymphadenopathy.    The aorta and IVC are unremarkable.    The visualized osseous structures are within normal limits.      IMPRESSION:    No evidence of small bowel obstruction or active bowel inflammation.    < end of copied text >    < from: US Gallbladder (18 @ 22:26) >    Limitedexam due to body habitus.    The liver is enlarged measuring 20.5 cm. Possible steatosis.    The gallbladder again demonstrates multiple stones measuring up to 1.9   cm. No wall thickening or pericholecystic fluid is seen. Negative   sonographic Robertson sign.    CBD measures 4 mm.    The pancreas is not well seen.    The right kidney measures 12 cm.    No ascites.    Unremarkable aorta    IMPRESSION:    Gallstones. No wall thickening. If symptoms continue, consider HIDA scan.      < end of copied text >    CXR: pending

## 2018-08-12 NOTE — CHART NOTE - NSCHARTNOTEFT_GEN_A_CORE
Asked by Dr. Roa to evaluate patient for possible admission/CDU.    HPI: 38M with PMH obesity presents for abdominal pain/epigastric.  Patient states association with eating.  States he was told in the past he has gallstones and needs his gallbladder removed.  Workup in ED remarkable and reviewed with patient and his family member.  I explained that patient has likely GERD and needs follow up with his primary care physician and possibly surgery.  I advised him to take the prilosec everyday and if he has continued symptoms to see Dr. Stringer for referral to surgery and possibly GI.  I also advised him to eat less fatty foods.    Patient noted understanding and to be discharged by ED attending.

## 2018-08-12 NOTE — H&P ADULT - PMH
Calculus of gallbladder with cholecystitis without biliary obstruction, unspecified cholecystitis acuity    CHRISTINE (obstructive sleep apnea)

## 2018-08-12 NOTE — ED PROVIDER NOTE - OBJECTIVE STATEMENT
38 year old M seen by myself yesterday with probable biliary colic and presents today with shaking chills and decreased abdominal pain. 38 year old M seen by myself yesterday with probable biliary colic , presents today with shaking chills and decreased abdominal pain.

## 2018-08-12 NOTE — ED ADULT NURSE NOTE - NSIMPLEMENTINTERV_GEN_ALL_ED
Implemented All Universal Safety Interventions:  [place] to call system. Call bell, personal items and telephone within reach. Instruct patient to call for assistance. Room bathroom lighting operational. Non-slip footwear when patient is off stretcher. Physically safe environment: no spills, clutter or unnecessary equipment. Stretcher in lowest position, wheels locked, appropriate side rails in place.

## 2018-08-12 NOTE — ED PROVIDER NOTE - MEDICAL DECISION MAKING DETAILS
pt seen by myself yesterday diagnosed with probable biliary colic pt presents today with acute fever and chills zosyn given case discussed with Dr. Patel

## 2018-08-12 NOTE — H&P ADULT - PROBLEM SELECTOR PLAN 1
cont IV Zosyn.   surgery consult.  HIDA scan in AM  IVFs, NPO with fever and sepsis (+fever, tachy, +wbc)  cont IV Zosyn.   surgery consult.  HIDA scan in AM  IVFs, NPO

## 2018-08-12 NOTE — ED ADULT NURSE NOTE - OBJECTIVE STATEMENT
pt presents complaining of fever and chills. Denies pain. Pt seen yesterday for epigastric pain and discharge home stable. Pt complaining of SOB. Pt speaking in full sentences with no SOB noted.

## 2018-08-12 NOTE — ED PROVIDER NOTE - OBJECTIVE STATEMENT
38 year old M with a history of biliary colic presents with severe epigastric pain after eating eggs.  Pt denies chest pain or shortness of breath.

## 2018-08-12 NOTE — H&P ADULT - ATTENDING COMMENTS
jessie(surgical attending)    I personally reviewed all imaging and lab results and examined patient.  he has sympotmatic cholelithiasis and acute cholecystitis.    I discussed planned laparoscopy including risks and benefits with patient.    dr lanie valdez

## 2018-08-12 NOTE — ED ADULT TRIAGE NOTE - CHIEF COMPLAINT QUOTE
diagnosed with Gallstones in ED yesterday today presents to fever and chills  denies abd pain nausea and vomiting

## 2018-08-13 DIAGNOSIS — A41.9 SEPSIS, UNSPECIFIED ORGANISM: ICD-10-CM

## 2018-08-13 PROBLEM — K80.50 CALCULUS OF BILE DUCT WITHOUT CHOLANGITIS OR CHOLECYSTITIS WITHOUT OBSTRUCTION: Chronic | Status: INACTIVE | Noted: 2018-08-11 | Resolved: 2018-08-12

## 2018-08-13 LAB
ALBUMIN SERPL ELPH-MCNC: 3.1 G/DL — LOW (ref 3.3–5)
ALP SERPL-CCNC: 107 U/L — SIGNIFICANT CHANGE UP (ref 40–120)
ALT FLD-CCNC: 71 U/L DA — HIGH (ref 10–45)
AMYLASE P1 CFR SERPL: 27 U/L — SIGNIFICANT CHANGE UP (ref 25–125)
ANION GAP SERPL CALC-SCNC: 10 MMOL/L — SIGNIFICANT CHANGE UP (ref 5–17)
AST SERPL-CCNC: 37 U/L — SIGNIFICANT CHANGE UP (ref 10–40)
BILIRUB SERPL-MCNC: 0.9 MG/DL — SIGNIFICANT CHANGE UP (ref 0.2–1.2)
BUN SERPL-MCNC: 8 MG/DL — SIGNIFICANT CHANGE UP (ref 7–23)
CALCIUM SERPL-MCNC: 8.5 MG/DL — SIGNIFICANT CHANGE UP (ref 8.4–10.5)
CHLORIDE SERPL-SCNC: 103 MMOL/L — SIGNIFICANT CHANGE UP (ref 96–108)
CO2 SERPL-SCNC: 25 MMOL/L — SIGNIFICANT CHANGE UP (ref 22–31)
CREAT SERPL-MCNC: 0.61 MG/DL — SIGNIFICANT CHANGE UP (ref 0.5–1.3)
E CLOAC COMP DNA BLD POS QL NAA+PROBE: SIGNIFICANT CHANGE UP
GLUCOSE SERPL-MCNC: 108 MG/DL — HIGH (ref 70–99)
GRAM STN FLD: SIGNIFICANT CHANGE UP
HCT VFR BLD CALC: 41.4 % — SIGNIFICANT CHANGE UP (ref 39–50)
HGB BLD-MCNC: 13.7 G/DL — SIGNIFICANT CHANGE UP (ref 13–17)
LIDOCAIN IGE QN: 59 U/L — LOW (ref 73–393)
MCHC RBC-ENTMCNC: 27.7 PG — SIGNIFICANT CHANGE UP (ref 27–34)
MCHC RBC-ENTMCNC: 33.1 GM/DL — SIGNIFICANT CHANGE UP (ref 32–36)
MCV RBC AUTO: 83.7 FL — SIGNIFICANT CHANGE UP (ref 80–100)
METHOD TYPE: SIGNIFICANT CHANGE UP
PLATELET # BLD AUTO: 258 K/UL — SIGNIFICANT CHANGE UP (ref 150–400)
POTASSIUM SERPL-MCNC: 4 MMOL/L — SIGNIFICANT CHANGE UP (ref 3.5–5.3)
POTASSIUM SERPL-SCNC: 4 MMOL/L — SIGNIFICANT CHANGE UP (ref 3.5–5.3)
PROT SERPL-MCNC: 7.8 G/DL — SIGNIFICANT CHANGE UP (ref 6–8.3)
RBC # BLD: 4.94 M/UL — SIGNIFICANT CHANGE UP (ref 4.2–5.8)
RBC # FLD: 12.6 % — SIGNIFICANT CHANGE UP (ref 10.3–14.5)
SODIUM SERPL-SCNC: 138 MMOL/L — SIGNIFICANT CHANGE UP (ref 135–145)
SPECIMEN SOURCE: SIGNIFICANT CHANGE UP
WBC # BLD: 17.2 K/UL — HIGH (ref 3.8–10.5)
WBC # FLD AUTO: 17.2 K/UL — HIGH (ref 3.8–10.5)

## 2018-08-13 PROCEDURE — 47480 INCISION OF GALLBLADDER: CPT | Mod: AS,22

## 2018-08-13 PROCEDURE — 99233 SBSQ HOSP IP/OBS HIGH 50: CPT

## 2018-08-13 PROCEDURE — 78226 HEPATOBILIARY SYSTEM IMAGING: CPT | Mod: 26

## 2018-08-13 PROCEDURE — 47480 INCISION OF GALLBLADDER: CPT | Mod: 22

## 2018-08-13 PROCEDURE — 99223 1ST HOSP IP/OBS HIGH 75: CPT | Mod: 57

## 2018-08-13 RX ORDER — ONDANSETRON 8 MG/1
4 TABLET, FILM COATED ORAL ONCE
Qty: 0 | Refills: 0 | Status: DISCONTINUED | OUTPATIENT
Start: 2018-08-13 | End: 2018-08-13

## 2018-08-13 RX ORDER — OXYCODONE HYDROCHLORIDE 5 MG/1
5 TABLET ORAL EVERY 4 HOURS
Qty: 0 | Refills: 0 | Status: DISCONTINUED | OUTPATIENT
Start: 2018-08-13 | End: 2018-08-18

## 2018-08-13 RX ORDER — KETOROLAC TROMETHAMINE 30 MG/ML
15 SYRINGE (ML) INJECTION ONCE
Qty: 0 | Refills: 0 | Status: DISCONTINUED | OUTPATIENT
Start: 2018-08-13 | End: 2018-08-13

## 2018-08-13 RX ORDER — ONDANSETRON 8 MG/1
4 TABLET, FILM COATED ORAL EVERY 6 HOURS
Qty: 0 | Refills: 0 | Status: DISCONTINUED | OUTPATIENT
Start: 2018-08-13 | End: 2018-08-18

## 2018-08-13 RX ORDER — MORPHINE SULFATE 50 MG/1
4 CAPSULE, EXTENDED RELEASE ORAL ONCE
Qty: 0 | Refills: 0 | Status: DISCONTINUED | OUTPATIENT
Start: 2018-08-13 | End: 2018-08-13

## 2018-08-13 RX ORDER — HYDROMORPHONE HYDROCHLORIDE 2 MG/ML
0.5 INJECTION INTRAMUSCULAR; INTRAVENOUS; SUBCUTANEOUS
Qty: 0 | Refills: 0 | Status: DISCONTINUED | OUTPATIENT
Start: 2018-08-13 | End: 2018-08-13

## 2018-08-13 RX ORDER — PIPERACILLIN AND TAZOBACTAM 4; .5 G/20ML; G/20ML
3.38 INJECTION, POWDER, LYOPHILIZED, FOR SOLUTION INTRAVENOUS EVERY 8 HOURS
Qty: 0 | Refills: 0 | Status: DISCONTINUED | OUTPATIENT
Start: 2018-08-13 | End: 2018-08-14

## 2018-08-13 RX ORDER — SODIUM CHLORIDE 9 MG/ML
1000 INJECTION, SOLUTION INTRAVENOUS
Qty: 0 | Refills: 0 | Status: DISCONTINUED | OUTPATIENT
Start: 2018-08-13 | End: 2018-08-14

## 2018-08-13 RX ORDER — HYDROMORPHONE HYDROCHLORIDE 2 MG/ML
1 INJECTION INTRAMUSCULAR; INTRAVENOUS; SUBCUTANEOUS
Qty: 0 | Refills: 0 | Status: DISCONTINUED | OUTPATIENT
Start: 2018-08-13 | End: 2018-08-18

## 2018-08-13 RX ORDER — ACETAMINOPHEN 500 MG
1000 TABLET ORAL ONCE
Qty: 0 | Refills: 0 | Status: COMPLETED | OUTPATIENT
Start: 2018-08-13 | End: 2018-08-13

## 2018-08-13 RX ORDER — ACETAMINOPHEN 500 MG
650 TABLET ORAL EVERY 6 HOURS
Qty: 0 | Refills: 0 | Status: DISCONTINUED | OUTPATIENT
Start: 2018-08-13 | End: 2018-08-18

## 2018-08-13 RX ORDER — SODIUM CHLORIDE 9 MG/ML
1000 INJECTION, SOLUTION INTRAVENOUS
Qty: 0 | Refills: 0 | Status: DISCONTINUED | OUTPATIENT
Start: 2018-08-13 | End: 2018-08-13

## 2018-08-13 RX ADMIN — HYDROMORPHONE HYDROCHLORIDE 0.5 MILLIGRAM(S): 2 INJECTION INTRAMUSCULAR; INTRAVENOUS; SUBCUTANEOUS at 20:45

## 2018-08-13 RX ADMIN — Medication 15 MILLIGRAM(S): at 22:23

## 2018-08-13 RX ADMIN — Medication 1000 MILLIGRAM(S): at 21:25

## 2018-08-13 RX ADMIN — Medication 15 MILLIGRAM(S): at 22:35

## 2018-08-13 RX ADMIN — Medication 400 MILLIGRAM(S): at 21:10

## 2018-08-13 RX ADMIN — PIPERACILLIN AND TAZOBACTAM 25 GRAM(S): 4; .5 INJECTION, POWDER, LYOPHILIZED, FOR SOLUTION INTRAVENOUS at 15:38

## 2018-08-13 RX ADMIN — Medication 650 MILLIGRAM(S): at 15:38

## 2018-08-13 RX ADMIN — HYDROMORPHONE HYDROCHLORIDE 0.5 MILLIGRAM(S): 2 INJECTION INTRAMUSCULAR; INTRAVENOUS; SUBCUTANEOUS at 20:55

## 2018-08-13 RX ADMIN — SODIUM CHLORIDE 100 MILLILITER(S): 9 INJECTION INTRAMUSCULAR; INTRAVENOUS; SUBCUTANEOUS at 01:00

## 2018-08-13 RX ADMIN — HYDROMORPHONE HYDROCHLORIDE 0.5 MILLIGRAM(S): 2 INJECTION INTRAMUSCULAR; INTRAVENOUS; SUBCUTANEOUS at 19:44

## 2018-08-13 RX ADMIN — Medication 650 MILLIGRAM(S): at 06:55

## 2018-08-13 RX ADMIN — HYDROMORPHONE HYDROCHLORIDE 0.5 MILLIGRAM(S): 2 INJECTION INTRAMUSCULAR; INTRAVENOUS; SUBCUTANEOUS at 21:25

## 2018-08-13 RX ADMIN — PIPERACILLIN AND TAZOBACTAM 25 GRAM(S): 4; .5 INJECTION, POWDER, LYOPHILIZED, FOR SOLUTION INTRAVENOUS at 05:18

## 2018-08-13 NOTE — PROVIDER CONTACT NOTE (MEDICATION) - SITUATION
patient received dilaudid 0.5mg for pain scale of 7/10 10 Minutes ago. now increasing pain and c/o right shoulder pain. abdomen soft at touch. HR 94. saturation 98%. /74

## 2018-08-13 NOTE — BRIEF OPERATIVE NOTE - OPERATION/FINDINGS
gallbladder walled off by omentum, colon and duodenum  no plane at all to separate gallbladder from surrounding structures  safest to avoid injury/hemorrhage was to placetube cholecystostomy

## 2018-08-13 NOTE — PROGRESS NOTE ADULT - SUBJECTIVE AND OBJECTIVE BOX
Patient is a 38y old  Male who presents with a chief complaint of fever.  Was in ER earlier yesterday, sent home because no toxic signs and patient felt better, returned to ER for worsening symptoms.    Patient with continued intermittent abdominal pain.     Patient seen and examined at bedside.    ALLERGIES:  cortisone (Unknown)    MEDICATIONS  (STANDING):  piperacillin/tazobactam IVPB. 3.375 Gram(s) IV Intermittent every 8 hours  sodium chloride 0.9%. 1000 milliLiter(s) (100 mL/Hr) IV Continuous <Continuous>    MEDICATIONS  (PRN):  acetaminophen   Tablet 650 milliGRAM(s) Oral every 6 hours PRN For Temp greater than 38 C (100.4 F)    Vital Signs Last 24 Hrs  T(F): 99.5 (13 Aug 2018 05:41), Max: 102.5 (12 Aug 2018 19:43)  HR: 95 (13 Aug 2018 05:41) (95 - 106)  BP: 126/80 (13 Aug 2018 05:41) (115/75 - 126/80)  RR: 15 (13 Aug 2018 05:41) (15 - 18)  SpO2: 99% (13 Aug 2018 05:41) (95% - 99%)  I&O's Summary    12 Aug 2018 07:01  -  13 Aug 2018 07:00  --------------------------------------------------------  IN: 500 mL / OUT: 0 mL / NET: 500 mL    BMI (kg/m2): 36.7 (18 @ 05:41)  PHYSICAL EXAM:  General: NAD, A/O x 3  ENT: MMM  Neck: Supple, No JVD  Lungs: Clear to auscultation bilaterally  Cardio: RRR, S1/S2, No murmurs  Abdomen: tenderness to palpation  Extremities: No calf tenderness, No pitting edema    LABS:                        14.0   13.6  )-----------( 283      ( 12 Aug 2018 20:14 )             42.7           136  |  99  |  14  ----------------------------<  109  3.6   |  27  |  0.70    Ca    9.0      12 Aug 2018 20:14    TPro  8.6  /  Alb  3.6  /  TBili  0.7  /  DBili  x   /  AST  32  /  ALT  57  /  AlkPhos  123       eGFR if African American: 138 mL/min/1.73M2 (18 @ 20:14)  eGFR if Non African American: 119 mL/min/1.73M2 (18 @ 20:14)    PT/INR - ( 12 Aug 2018 20:14 )   PT: 13.2 sec;   INR: 1.19 ratio       PTT - ( 12 Aug 2018 20:14 )  PTT:27.8 sec   Lactate, Blood: 2.0 mmol/L ( @ 20:00)    CAPILLARY BLOOD GLUCOSE    Urinalysis Basic - ( 12 Aug 2018 21:13 )    Color: Yellow / Appearance: Clear / S.005 / pH: x  Gluc: x / Ketone: Negative  / Bili: Negative / Urobili: Negative   Blood: x / Protein: 30 mg/dL / Nitrite: Negative   Leuk Esterase: Negative / RBC: 0-4 /HPF / WBC 0-2 /HPF   Sq Epi: x / Non Sq Epi: Neg.-Few / Bacteria: Negative /HPF    RADIOLOGY & ADDITIONAL TESTS:    Care Discussed with Consultants/Other Providers:

## 2018-08-13 NOTE — PROVIDER CONTACT NOTE (MEDICATION) - ACTION/TREATMENT ORDERED:
second dose of dilaudid given. PA at bedside assessing patient. right shoulder pain related to gas pain possibly. patient calmed down and seems more confortable after

## 2018-08-13 NOTE — BRIEF OPERATIVE NOTE - BRIEF OP NOTE DRAINS
large malecot place in lumen of gallbladder  J cavazos place in liver/sidewall iterface as Morrisons pouch completely obliterated by inflammatory response

## 2018-08-13 NOTE — BRIEF OPERATIVE NOTE - POST-OP DX
Acute cholecystitis due to biliary calculus  08/13/2018    Santino Feldman  Empyema of gallbladder  08/13/2018    Santino Feldman

## 2018-08-13 NOTE — BRIEF OPERATIVE NOTE - COMMENTS
patient was septic preoperatively spiking temperatures through antibiotics  cholecystectomy could not be safely performed so tube cholecystostomy is safest option  (family informed with brother acting as )

## 2018-08-13 NOTE — CHART NOTE - NSCHARTNOTEFT_GEN_A_CORE
dr stiles asked me to take over care of patient as per our on call agreement.  hx of epigastric pain, ruq tenderness, increased wbc, gallstones on sonogram and positive HIDA scan.    patient scheduled for laparoscopic cholecystectomy this afternoon(put on "add on" list)        dr lanie valdez

## 2018-08-14 DIAGNOSIS — K80.00 CALCULUS OF GALLBLADDER WITH ACUTE CHOLECYSTITIS WITHOUT OBSTRUCTION: ICD-10-CM

## 2018-08-14 DIAGNOSIS — R78.81 BACTEREMIA: ICD-10-CM

## 2018-08-14 DIAGNOSIS — Z29.9 ENCOUNTER FOR PROPHYLACTIC MEASURES, UNSPECIFIED: ICD-10-CM

## 2018-08-14 LAB
ALBUMIN SERPL ELPH-MCNC: 2.9 G/DL — LOW (ref 3.3–5)
ALP SERPL-CCNC: 109 U/L — SIGNIFICANT CHANGE UP (ref 40–120)
ALT FLD-CCNC: 85 U/L DA — HIGH (ref 10–45)
ANION GAP SERPL CALC-SCNC: 6 MMOL/L — SIGNIFICANT CHANGE UP (ref 5–17)
AST SERPL-CCNC: 36 U/L — SIGNIFICANT CHANGE UP (ref 10–40)
BILIRUB SERPL-MCNC: 0.7 MG/DL — SIGNIFICANT CHANGE UP (ref 0.2–1.2)
BUN SERPL-MCNC: 15 MG/DL — SIGNIFICANT CHANGE UP (ref 7–23)
CALCIUM SERPL-MCNC: 8.6 MG/DL — SIGNIFICANT CHANGE UP (ref 8.4–10.5)
CHLORIDE SERPL-SCNC: 100 MMOL/L — SIGNIFICANT CHANGE UP (ref 96–108)
CO2 SERPL-SCNC: 26 MMOL/L — SIGNIFICANT CHANGE UP (ref 22–31)
CREAT SERPL-MCNC: 0.64 MG/DL — SIGNIFICANT CHANGE UP (ref 0.5–1.3)
CULTURE RESULTS: NO GROWTH — SIGNIFICANT CHANGE UP
GLUCOSE SERPL-MCNC: 104 MG/DL — HIGH (ref 70–99)
HCT VFR BLD CALC: 40.3 % — SIGNIFICANT CHANGE UP (ref 39–50)
HGB BLD-MCNC: 13.1 G/DL — SIGNIFICANT CHANGE UP (ref 13–17)
MCHC RBC-ENTMCNC: 27.7 PG — SIGNIFICANT CHANGE UP (ref 27–34)
MCHC RBC-ENTMCNC: 32.6 GM/DL — SIGNIFICANT CHANGE UP (ref 32–36)
MCV RBC AUTO: 85 FL — SIGNIFICANT CHANGE UP (ref 80–100)
PLATELET # BLD AUTO: 233 K/UL — SIGNIFICANT CHANGE UP (ref 150–400)
POTASSIUM SERPL-MCNC: 3.8 MMOL/L — SIGNIFICANT CHANGE UP (ref 3.5–5.3)
POTASSIUM SERPL-SCNC: 3.8 MMOL/L — SIGNIFICANT CHANGE UP (ref 3.5–5.3)
PROT SERPL-MCNC: 7.9 G/DL — SIGNIFICANT CHANGE UP (ref 6–8.3)
RBC # BLD: 4.74 M/UL — SIGNIFICANT CHANGE UP (ref 4.2–5.8)
RBC # FLD: 12.8 % — SIGNIFICANT CHANGE UP (ref 10.3–14.5)
SODIUM SERPL-SCNC: 132 MMOL/L — LOW (ref 135–145)
SPECIMEN SOURCE: SIGNIFICANT CHANGE UP
WBC # BLD: 15.1 K/UL — HIGH (ref 3.8–10.5)
WBC # FLD AUTO: 15.1 K/UL — HIGH (ref 3.8–10.5)

## 2018-08-14 PROCEDURE — 99233 SBSQ HOSP IP/OBS HIGH 50: CPT

## 2018-08-14 RX ORDER — MEROPENEM 1 G/30ML
INJECTION INTRAVENOUS
Qty: 0 | Refills: 0 | Status: DISCONTINUED | OUTPATIENT
Start: 2018-08-14 | End: 2018-08-18

## 2018-08-14 RX ORDER — MORPHINE SULFATE 50 MG/1
2 CAPSULE, EXTENDED RELEASE ORAL EVERY 4 HOURS
Qty: 0 | Refills: 0 | Status: DISCONTINUED | OUTPATIENT
Start: 2018-08-14 | End: 2018-08-18

## 2018-08-14 RX ORDER — ENOXAPARIN SODIUM 100 MG/ML
40 INJECTION SUBCUTANEOUS DAILY
Qty: 0 | Refills: 0 | Status: DISCONTINUED | OUTPATIENT
Start: 2018-08-14 | End: 2018-08-18

## 2018-08-14 RX ORDER — MEROPENEM 1 G/30ML
1000 INJECTION INTRAVENOUS EVERY 8 HOURS
Qty: 0 | Refills: 0 | Status: DISCONTINUED | OUTPATIENT
Start: 2018-08-14 | End: 2018-08-18

## 2018-08-14 RX ORDER — PANTOPRAZOLE SODIUM 20 MG/1
40 TABLET, DELAYED RELEASE ORAL
Qty: 0 | Refills: 0 | Status: DISCONTINUED | OUTPATIENT
Start: 2018-08-14 | End: 2018-08-18

## 2018-08-14 RX ORDER — MEROPENEM 1 G/30ML
1000 INJECTION INTRAVENOUS ONCE
Qty: 0 | Refills: 0 | Status: COMPLETED | OUTPATIENT
Start: 2018-08-14 | End: 2018-08-14

## 2018-08-14 RX ORDER — KETOROLAC TROMETHAMINE 30 MG/ML
15 SYRINGE (ML) INJECTION EVERY 6 HOURS
Qty: 0 | Refills: 0 | Status: DISCONTINUED | OUTPATIENT
Start: 2018-08-14 | End: 2018-08-18

## 2018-08-14 RX ORDER — DEXTROSE MONOHYDRATE, SODIUM CHLORIDE, AND POTASSIUM CHLORIDE 50; .745; 4.5 G/1000ML; G/1000ML; G/1000ML
1000 INJECTION, SOLUTION INTRAVENOUS
Qty: 0 | Refills: 0 | Status: DISCONTINUED | OUTPATIENT
Start: 2018-08-14 | End: 2018-08-16

## 2018-08-14 RX ADMIN — HYDROMORPHONE HYDROCHLORIDE 1 MILLIGRAM(S): 2 INJECTION INTRAMUSCULAR; INTRAVENOUS; SUBCUTANEOUS at 05:59

## 2018-08-14 RX ADMIN — Medication 325 MILLIGRAM(S): at 21:25

## 2018-08-14 RX ADMIN — Medication 15 MILLIGRAM(S): at 13:25

## 2018-08-14 RX ADMIN — MEROPENEM 100 MILLIGRAM(S): 1 INJECTION INTRAVENOUS at 21:13

## 2018-08-14 RX ADMIN — ENOXAPARIN SODIUM 40 MILLIGRAM(S): 100 INJECTION SUBCUTANEOUS at 21:13

## 2018-08-14 RX ADMIN — OXYCODONE HYDROCHLORIDE 5 MILLIGRAM(S): 5 TABLET ORAL at 17:31

## 2018-08-14 RX ADMIN — PIPERACILLIN AND TAZOBACTAM 25 GRAM(S): 4; .5 INJECTION, POWDER, LYOPHILIZED, FOR SOLUTION INTRAVENOUS at 07:52

## 2018-08-14 RX ADMIN — MEROPENEM 100 MILLIGRAM(S): 1 INJECTION INTRAVENOUS at 15:07

## 2018-08-14 RX ADMIN — OXYCODONE HYDROCHLORIDE 5 MILLIGRAM(S): 5 TABLET ORAL at 01:53

## 2018-08-14 RX ADMIN — HYDROMORPHONE HYDROCHLORIDE 1 MILLIGRAM(S): 2 INJECTION INTRAMUSCULAR; INTRAVENOUS; SUBCUTANEOUS at 15:07

## 2018-08-14 RX ADMIN — MORPHINE SULFATE 2 MILLIGRAM(S): 50 CAPSULE, EXTENDED RELEASE ORAL at 10:32

## 2018-08-14 RX ADMIN — Medication 15 MILLIGRAM(S): at 11:31

## 2018-08-14 RX ADMIN — HYDROMORPHONE HYDROCHLORIDE 1 MILLIGRAM(S): 2 INJECTION INTRAMUSCULAR; INTRAVENOUS; SUBCUTANEOUS at 14:20

## 2018-08-14 RX ADMIN — OXYCODONE HYDROCHLORIDE 5 MILLIGRAM(S): 5 TABLET ORAL at 16:03

## 2018-08-14 RX ADMIN — HYDROMORPHONE HYDROCHLORIDE 1 MILLIGRAM(S): 2 INJECTION INTRAMUSCULAR; INTRAVENOUS; SUBCUTANEOUS at 08:36

## 2018-08-14 RX ADMIN — Medication 650 MILLIGRAM(S): at 22:34

## 2018-08-14 RX ADMIN — PIPERACILLIN AND TAZOBACTAM 25 GRAM(S): 4; .5 INJECTION, POWDER, LYOPHILIZED, FOR SOLUTION INTRAVENOUS at 00:34

## 2018-08-14 RX ADMIN — PANTOPRAZOLE SODIUM 40 MILLIGRAM(S): 20 TABLET, DELAYED RELEASE ORAL at 21:13

## 2018-08-14 RX ADMIN — HYDROMORPHONE HYDROCHLORIDE 1 MILLIGRAM(S): 2 INJECTION INTRAMUSCULAR; INTRAVENOUS; SUBCUTANEOUS at 08:29

## 2018-08-14 RX ADMIN — HYDROMORPHONE HYDROCHLORIDE 1 MILLIGRAM(S): 2 INJECTION INTRAMUSCULAR; INTRAVENOUS; SUBCUTANEOUS at 02:31

## 2018-08-14 RX ADMIN — HYDROMORPHONE HYDROCHLORIDE 1 MILLIGRAM(S): 2 INJECTION INTRAMUSCULAR; INTRAVENOUS; SUBCUTANEOUS at 18:08

## 2018-08-14 RX ADMIN — OXYCODONE HYDROCHLORIDE 5 MILLIGRAM(S): 5 TABLET ORAL at 00:39

## 2018-08-14 RX ADMIN — HYDROMORPHONE HYDROCHLORIDE 1 MILLIGRAM(S): 2 INJECTION INTRAMUSCULAR; INTRAVENOUS; SUBCUTANEOUS at 01:56

## 2018-08-14 RX ADMIN — MORPHINE SULFATE 2 MILLIGRAM(S): 50 CAPSULE, EXTENDED RELEASE ORAL at 09:50

## 2018-08-14 RX ADMIN — HYDROMORPHONE HYDROCHLORIDE 1 MILLIGRAM(S): 2 INJECTION INTRAMUSCULAR; INTRAVENOUS; SUBCUTANEOUS at 06:33

## 2018-08-14 NOTE — PROGRESS NOTE ADULT - SUBJECTIVE AND OBJECTIVE BOX
Patient is a 38y old  Male who presents with a chief complaint of fever (12 Aug 2018 22:32)      Patient seen and examined at bedside. pt co abdominal pain 7/10 unable to take deep breaths no fever no chills no n/v    ALLERGIES:  cortisone (Unknown)    MEDICATIONS:  acetaminophen   Tablet 650 milliGRAM(s) Oral every 6 hours PRN  dextrose 5% + sodium chloride 0.9% with potassium chloride 20 mEq/L 1000 milliLiter(s) IV Continuous <Continuous>  enoxaparin Injectable 40 milliGRAM(s) SubCutaneous daily  ketorolac   Injectable 15 milliGRAM(s) IV Push every 6 hours PRN  morphine  - Injectable 2 milliGRAM(s) IV Push every 4 hours PRN  pantoprazole    Tablet 40 milliGRAM(s) Oral before breakfast  piperacillin/tazobactam IVPB. 3.375 Gram(s) IV Intermittent every 8 hours    Vital Signs Last 24 Hrs  T(F): 99.2 (14 Aug 2018 08:37), Max: 99.2 (14 Aug 2018 08:37)  HR: 101 (14 Aug 2018 08:37) (82 - 101)  BP: 120/70 (14 Aug 2018 08:37) (115/67 - 159/92)  RR: 16 (14 Aug 2018 08:37) (14 - 22)  SpO2: 100% (14 Aug 2018 08:37) (97% - 100%)  I&O's Summary    13 Aug 2018 07:  -  14 Aug 2018 07:00  --------------------------------------------------------  IN: 925 mL / OUT: 615 mL / NET: 310 mL    14 Aug 2018 07:01  -  14 Aug 2018 13:27  --------------------------------------------------------  IN: 340 mL / OUT: 710 mL / NET: -370 mL        PHYSICAL EXAM:  General: NAD, A/O x 3  ENT: MMM  Neck: Supple, No JVD  Lungs: Clear to auscultation bilaterally  Cardio: RRR, S1/S2, No murmurs  Abdomen: + bilary drain in place mild tenderness +bs +   Extremities: No cyanosis, No edema    LABS:                        13.1   15.1  )-----------( 233      ( 14 Aug 2018 06:10 )             40.3         132  |  100  |  15  ----------------------------<  104  3.8   |  26  |  0.64    Ca    8.6      14 Aug 2018 06:10    TPro  7.9  /  Alb  2.9  /  TBili  0.7  /  DBili  x   /  AST  36  /  ALT  85  /  AlkPhos  109      eGFR if Non African American: 123 mL/min/1.73M2 (18 @ 06:10)  eGFR if African American: 143 mL/min/1.73M2 (18 @ 06:10)    PT/INR - ( 12 Aug 2018 20:14 )   PT: 13.2 sec;   INR: 1.19 ratio         PTT - ( 12 Aug 2018 20:14 )  PTT:27.8 sec  Lactate, Blood: 2.0 mmol/L ( @ 20:00)                CAPILLARY BLOOD GLUCOSE          Urinalysis Basic - ( 12 Aug 2018 21:13 )    Color: Yellow / Appearance: Clear / S.005 / pH: x  Gluc: x / Ketone: Negative  / Bili: Negative / Urobili: Negative   Blood: x / Protein: 30 mg/dL / Nitrite: Negative   Leuk Esterase: Negative / RBC: 0-4 /HPF / WBC 0-2 /HPF   Sq Epi: x / Non Sq Epi: Neg.-Few / Bacteria: Negative /HPF        Culture - Body Fluid with Gram Stain (collected 13 Aug 2018 19:46)  Source: .Body Fluid gallbladder  Gram Stain (14 Aug 2018 06:59):    polymorphonuclear leukocytes seen    Gram Negative Rods seen    by cytocentrifuge    Culture - Urine (collected 12 Aug 2018 21:13)  Source: .Urine Clean Catch (Midstream)  Final Report (14 Aug 2018 10:55):    No growth    Culture - Blood (collected 12 Aug 2018 20:14)  Source: .Blood Blood-Peripheral  Preliminary Report (14 Aug 2018 01:01):    No growth to date.    Culture - Blood (collected 12 Aug 2018 20:14)  Source: .Blood Blood-Peripheral  Gram Stain (13 Aug 2018 14:23):    Growth in anaerobic bottle: Gram Negative Rods  Preliminary Report (14 Aug 2018 11:14):    Growth in anaerobic bottle: Enterobacter cloacae/asburiae    "Due to technical problems, Proteus sp. will Not be reported as part of    the BCID panel until further notice"    ***Blood Panel PCR results on this specimen are available    approximately 3 hours after the Gram stain result.***    Gram stain, PCR, and/or culture results may not always    correspond due to difference in methodologies.    ************************************************************    This PCR assay was performed using [employer].    The following targets are tested for: Enterococcus,    vancomycin resistant enterococci, Listeria monocytogenes,    coagulase negative staphylococci, S. aureus,    methicillin resistant S. aureus, Streptococcus agalactiae    (Group B), S. pneumoniae, S.pyogenes (Group A),    Acinetobacter baumannii, Enterobacter cloacae, E. coli,    Klebsiella oxytoca, K. pneumoniae, Proteus sp.,    Serratia marcescens, Haemophilus influenzae,    Neisseria meningitidis, Pseudomonas aeruginosa, Candida    albicans, C. glabrata, C krusei, C parapsilosis,    C. tropicalis and the KPC resistance gene.  Organism: Blood Culture PCR (13 Aug 2018 15:46)  Organism: Blood Culture PCR (13 Aug 2018 15:46)      -  Enterobacter cloacae complex: Detec      Method Type: PCR        RADIOLOGY & ADDITIONAL TESTS:    Care Discussed with Consultants/Other Providers:

## 2018-08-14 NOTE — CONSULT NOTE ADULT - SUBJECTIVE AND OBJECTIVE BOX
CC:Patient is a 38y old  Male who presents with a chief complaint of fever (12 Aug 2018 22:32)      Subjective:  Pt seen and examined at bedside with chaperone. Pt is AAOx3, pt in no acute distress. Pt has c/o nausea, emesis, epigastric abd pain that began 8/12/18; pt was evaluated and d/c'd from ER, pt now states no abd pain, but c/o fever and chills x 1 day. Pt denied c/o chest pain, SOB, extremity pain or dysfunction, hemoptysis, hematemesis, hematuria, hematochexia, headache, diplopia, vertigo, dizzyness. Pt tolerating diet, (+) void, (+) ambulation, (+) bowel function    ROS:  nausea, emesis, fever, chills    PMH: CHRISTINE, obesity, biliary colic  PSH: denied  Allergies: cortisone (Unknown)  SH: social etoh, denied tobacco or illicit drug use  FH: non contributory at present      Vital Signs Last 24 Hrs  T(C): 37.9 (12 Aug 2018 21:35), Max: 39.2 (12 Aug 2018 19:43)  T(F): 100.2 (12 Aug 2018 21:35), Max: 102.5 (12 Aug 2018 19:43)  HR: 101 (12 Aug 2018 21:35) (54 - 106)  BP: 124/74 (12 Aug 2018 21:35) (120/80 - 125/73)  BP(mean): --  RR: 17 (12 Aug 2018 21:35) (16 - 17)  SpO2: 95% (12 Aug 2018 21:35) (95% - 99%)    Labs:                        14.0   13.6  )-----------( 283      ( 12 Aug 2018 20:14 )             42.7     CBC Full  -  ( 12 Aug 2018 20:14 )  WBC Count : 13.6 K/uL  Hemoglobin : 14.0 g/dL  Hematocrit : 42.7 %  Platelet Count - Automated : 283 K/uL  Mean Cell Volume : 83.4 fl  Mean Cell Hemoglobin : 27.5 pg  Mean Cell Hemoglobin Concentration : 32.9 gm/dL  Auto Neutrophil # : 13.0 K/uL  Auto Lymphocyte # : 0.4 K/uL  Auto Monocyte # : 0.1 K/uL  Auto Eosinophil # : 0.0 K/uL  Auto Basophil # : 0.0 K/uL  Auto Neutrophil % : 95.4 %  Auto Lymphocyte % : 3.1 %  Auto Monocyte % : 1.1 %  Auto Eosinophil % : 0.1 %  Auto Basophil % : 0.3 %    08-12    136  |  99  |  14  ----------------------------<  109<H>  3.6   |  27  |  0.70    Ca    9.0      12 Aug 2018 20:14    TPro  8.6<H>  /  Alb  3.6  /  TBili  0.7  /  DBili  x   /  AST  32  /  ALT  57<H>  /  AlkPhos  123<H>  08-12    LIVER FUNCTIONS - ( 12 Aug 2018 20:14 )  Alb: 3.6 g/dL / Pro: 8.6 g/dL / ALK PHOS: 123 U/L / ALT: 57 U/L DA / AST: 32 U/L / GGT: x           PT/INR - ( 12 Aug 2018 20:14 )   PT: 13.2 sec;   INR: 1.19 ratio         PTT - ( 12 Aug 2018 20:14 )  PTT:27.8 sec      Meds:  acetaminophen   Tablet 650 milliGRAM(s) Oral every 6 hours PRN  piperacillin/tazobactam IVPB. 3.375 Gram(s) IV Intermittent every 8 hours  sodium chloride 0.9%. 1000 milliLiter(s) IV Continuous <Continuous>      Radiology:  < from: CT Abdomen and Pelvis w/ Oral Cont and w/ IV Cont (08.12.18 @ 03:23) >  EXAM:  CT ABDOMEN AND PELVIS OC IC      PROCEDURE DATE:  08/12/2018        INTERPRETATION:  CLINICAL INFORMATION: Abdominal pain    TECHNIQUE: CT imaging of the abdomen and pelvis was performed with IV and   oral contrast. 90 mL of Omnipaque 350 was injected intravenously. 10 mL   was discarded.    COMPARISON: January 8, 2016    FINDINGS:    The heart and lung bases are unremarkable.    The spleen, pancreas, gallbladder and biliary tree are unremarkable. The   liver is normal in morphology. There is diffuse hepatic steatosis. The   portal vein is patent.    The kidneys enhance symmetrically without hydronephrosis. The adrenal   glands are unremarkable.    There is no bowel obstruction or ascites. The appendix is normal.    The urinary bladder is unremarkable. There is no pelvic mass.    There is no abdominal or pelvic lymphadenopathy.    The aorta and IVC are unremarkable.    The visualized osseous structures are within normal limits.      IMPRESSION:    No evidence of small bowel obstruction or active bowel inflammation.                  DEACON CARIAS   This document has been electronically signed. Aug 12 2018  3:41AM        < end of copied text >  < from: US Gallbladder (08.11.18 @ 22:26) >  EXAM:  US GALLBLADDER      PROCEDURE DATE:  08/11/2018        INTERPRETATION:  8/11/2018 10:56 PM    CLINICAL HISTORY:  Abdominal pain.    TECHNIQUE: Ultrasound of the right upper quadrant    COMPARISON: Ultrasound 5/22/2018    FINDINGS:    Limitedexam due to body habitus.    The liver is enlarged measuring 20.5 cm. Possible steatosis.    The gallbladder again demonstrates multiple stones measuring up to 1.9   cm. No wall thickening or pericholecystic fluid is seen. Negative   sonographic Robertson sign.    CBD measures 4 mm.    The pancreas is not well seen.    The right kidney measures 12 cm.    No ascites.    Unremarkable aorta    IMPRESSION:    Gallstones. No wall thickening. If symptoms continue, consider HIDA scan.                  EVA ORTA   This document has been electronically signed. Aug 11 2018 11:00PM    < end of copied text >      Physical exam:  Pt is AAOx3  Pt in no acute distress  CNII-XII grossly intact   HEENT: Normocephalic, atraumatic, ROSA, EOM wnl  Neck: No crepitus, no ecchymosis, no hematoma, to exam, no JVD, no tracheal deviation  Cardiovascular: S1S2 Present  Respiratory: Respiratory Effort normal; no wheezes, rales or rhonchi to exam, CTAB  ABD: bowel sounds (+), obese habitus, soft, nontender, non distended, no rebound, no guarding, no rigidity, no skin changes to exam. No pelvic instability to exam, no skin changes, negative robertson's sign to exam  Musculoskeletal: All digits are warm and well perfused. Pt demonstrates grossly intact sensoromotor function. Pt has good capillary refill to digits, no calf edema or tenderness to exam.  Skin: no jaundice or icteric sclera to exam b/l, no skin changes to exam
HPI:   Patient is a 38y male with biliary colic issues for 2 years who developed severe abdomen pain starting 3 days ago then fever so came to hospital yesterday and was found to have severe cholecystitis. He went for laparoscopic cholecystectomy but the gall bladder was so adherent to everything that it could not be removed so a drain was placed. He is now feeling a bit better.     REVIEW OF SYSTEMS:  All other review of systems negative (Comprehensive ROS)    PAST MEDICAL & SURGICAL HISTORY:  CHRISTINE (obstructive sleep apnea)  Calculus of gallbladder with cholecystitis without biliary obstruction, unspecified cholecystitis acuity  No significant past surgical history      Allergies    cortisone (Unknown)    Intolerances        Antimicrobials Day #  :pod 1  piperacillin/tazobactam IVPB. 3.375 Gram(s) IV Intermittent every 8 hours    Other Medications:  acetaminophen   Tablet 650 milliGRAM(s) Oral every 6 hours PRN  acetaminophen   Tablet. 650 milliGRAM(s) Oral every 6 hours PRN  dextrose 5% + sodium chloride 0.9% with potassium chloride 20 mEq/L 1000 milliLiter(s) IV Continuous <Continuous>  enoxaparin Injectable 40 milliGRAM(s) SubCutaneous daily  HYDROmorphone  Injectable 1 milliGRAM(s) IV Push every 3 hours PRN  ketorolac   Injectable 15 milliGRAM(s) IV Push every 6 hours PRN  morphine  - Injectable 2 milliGRAM(s) IV Push every 4 hours PRN  ondansetron Injectable 4 milliGRAM(s) IV Push every 6 hours PRN  oxyCODONE    IR 5 milliGRAM(s) Oral every 4 hours PRN  pantoprazole    Tablet 40 milliGRAM(s) Oral before breakfast      FAMILY HISTORY:  Family history of diabetes mellitus      SOCIAL HISTORY:  Smoking: [ ]Yes x[ ]No  ETOH: [ ]Yes [ ]xxNo  Drug Use: [ ]Yes [ ]No   [ ] Single[ x]    T(F): 99.2 (18 @ 08:37), Max: 99.2 (18 @ 08:37)  HR: 101 (18 @ 08:37)  BP: 120/70 (18 @ 08:37)  RR: 16 (18 @ 08:37)  SpO2: 100% (18 @ 08:37)  Wt(kg): --    PHYSICAL EXAM:  General: alert, no acute distress  Eyes:  anicteric, no conjunctival injection, no discharge  Oropharynx: no lesions or injection 	  Neck: supple, without adenopathy  Lungs: clear to auscultation  Heart: regular rate and rhythm; no murmur, rubs or gallops  Abdomen: distended, tender, without mass or organomegaly, quiet bs  Skin: no lesions  Extremities: no clubbing, cyanosis, or edema  Neurologic: alert, oriented, moves all extremities    LAB RESULTS:                        13.1   15.1  )-----------( 233      ( 14 Aug 2018 06:10 )             40.3     -14    132<L>  |  100  |  15  ----------------------------<  104<H>  3.8   |  26  |  0.64    Ca    8.6      14 Aug 2018 06:10    TPro  7.9  /  Alb  2.9<L>  /  TBili  0.7  /  DBili  x   /  AST  36  /  ALT  85<H>  /  AlkPhos  109      LIVER FUNCTIONS - ( 14 Aug 2018 06:10 )  Alb: 2.9 g/dL / Pro: 7.9 g/dL / ALK PHOS: 109 U/L / ALT: 85 U/L DA / AST: 36 U/L / GGT: x           Urinalysis Basic - ( 12 Aug 2018 21:13 )    Color: Yellow / Appearance: Clear / S.005 / pH: x  Gluc: x / Ketone: Negative  / Bili: Negative / Urobili: Negative   Blood: x / Protein: 30 mg/dL / Nitrite: Negative   Leuk Esterase: Negative / RBC: 0-4 /HPF / WBC 0-2 /HPF   Sq Epi: x / Non Sq Epi: Neg.-Few / Bacteria: Negative /HPF        MICROBIOLOGY:  RECENT CULTURES:   @ 19:46 .Body Fluid gallbladder       polymorphonuclear leukocytes seen  Gram Negative Rods seen  by cytocentrifuge     @ 21:13 .Urine Clean Catch (Midstream)     No growth       @ 20:14 .Blood Blood-Peripheral Blood Culture PCR    Growth in anaerobic bottle: Enterobacter cloacae/asburiae  "Due to technical problems, Proteus sp. will Not be reported as part of  the BCID panel until further notice"  ***Blood Panel PCR results on this specimen are available  approximately 3 hours after the Gram stain result.***  Gram stain, PCR, and/or culture results may not always  correspond due to difference in methodologies.  ************************************************************  This PCR assay was performed using [employer].  The following targets are tested for: Enterococcus,  vancomycin resistant enterococci, Listeria monocytogenes,  coagulase negative staphylococci, S. aureus,  methicillin resistant S. aureus, Streptococcus agalactiae  (Group B), S. pneumoniae, S.pyogenes (Group A),  Acinetobacter baumannii, Enterobacter cloacae, E. coli,  Klebsiella oxytoca, K. pneumoniae, Proteus sp.,  Serratia marcescens, Haemophilus influenzae,  Neisseria meningitidis, Pseudomonas aeruginosa, Candida  albicans, C. glabrata, C krusei, C parapsilosis,  C. tropicalis and the KPC resistance gene.    Growth in anaerobic bottle: Gram Negative Rods          RADIOLOGY REVIEWED:  < from: NM Hepatobiliary Scan w/wo Gall Bladder (18 @ 12:37) >  IMPRESSION: Abnormal morphine-augmented hepatobiliary scan: acute   cholecystitis.      < end of copied text >    < from: CT Abdomen and Pelvis w/ Oral Cont and w/ IV Cont (18 @ 03:23) >    EXAM:  CT ABDOMEN AND PELVIS OC IC      PROCEDURE DATE:  2018        INTERPRETATION:  CLINICAL INFORMATION: Abdominal pain    TECHNIQUE: CT imaging of the abdomen and pelvis was performed with IV and   oral contrast. 90 mL of Omnipaque 350 was injected intravenously. 10 mL   was discarded.    COMPARISON: 2016    FINDINGS:    The heart and lung bases are unremarkable.    The spleen, pancreas, gallbladder and biliary tree are unremarkable. The   liver is normal in morphology. There is diffuse hepatic steatosis. The   portal vein is patent.    The kidneys enhance symmetrically without hydronephrosis. The adrenal   glands are unremarkable.    There is no bowel obstruction or ascites. The appendix is normal.    The urinary bladder is unremarkable. There is no pelvic mass.    There is no abdominal or pelvic lymphadenopathy.    The aorta and IVC are unremarkable.    The visualized osseous structures are within normal limits.      IMPRESSION:    No evidence of small bowel obstruction or active bowel inflammation.      < end of copied text >    < from: US Gallbladder (18 @ 22:26) >    IMPRESSION:    Gallstones. No wall thickening. If symptoms continue, consider HIDA scan.      < end of copied text >      Impression:    Patient with acute cholecystitis s/p laparoscopic attempt at cholecystectomy but the gallbladder was too adherent to surrounding liver and bowel so a drain was placed and cholecystecomy was aborted. He is now known to have enterobacter bacteremia so will change to meropenem       Recommendations: Change zosyn to meropenem  repeat blood cultures  patrick tube and timing of reattempt at cholecystectomy to be determined by Dr. Pritchett  must monitor for liver abscess and choledocholithiasis

## 2018-08-14 NOTE — PROGRESS NOTE ADULT - SUBJECTIVE AND OBJECTIVE BOX
pod#1  temp down  feels much better  draining a lot of bile into bile   lungs clear to p&a  abdomen-benign      labs:Complete Blood Count in AM (08.14.18 @ 06:10)    WBC Count: 15.1 K/uL    RBC Count: 4.74 M/uL    Hemoglobin: 13.1 g/dL    Hematocrit: 40.3 %    Mean Cell Volume: 85.0 fl    Mean Cell Hemoglobin: 27.7 pg    Mean Cell Hemoglobin Conc: 32.6 gm/dL    Red Cell Distrib Width: 12.8 %    Platelet Count - Automated: 233 K/uL    Comprehensive Metabolic Panel in AM (08.14.18 @ 06:10)    Sodium, Serum: 132 mmol/L    Potassium, Serum: 3.8 mmol/L    Chloride, Serum: 100 mmol/L    Carbon Dioxide, Serum: 26 mmol/L    Anion Gap, Serum: 6 mmol/L    Blood Urea Nitrogen, Serum: 15 mg/dL    Creatinine, Serum: 0.64 mg/dL    Glucose, Serum: 104 mg/dL    Calcium, Total Serum: 8.6 mg/dL    Protein Total, Serum: 7.9 g/dL    Albumin, Serum: 2.9 g/dL    Bilirubin Total, Serum: 0.7 mg/dL    Alkaline Phosphatase, Serum: 109 U/L    Aspartate Aminotransferase (AST/SGOT): 36 U/L    Alanine Aminotransferase (ALT/SGPT): 85 U/L DA    eGFR if Non : 123: Interpretative comment  The units for eGFR are ml/min/1.73m2 (normalized body surface area). The  eGFR is   eGFR if : 143 mL/min/1.73M2

## 2018-08-15 LAB
-  AMIKACIN: SIGNIFICANT CHANGE UP
-  AMPICILLIN/SULBACTAM: SIGNIFICANT CHANGE UP
-  AMPICILLIN: SIGNIFICANT CHANGE UP
-  AZTREONAM: SIGNIFICANT CHANGE UP
-  CEFAZOLIN: SIGNIFICANT CHANGE UP
-  CEFEPIME: SIGNIFICANT CHANGE UP
-  CEFOXITIN: SIGNIFICANT CHANGE UP
-  CEFTRIAXONE: SIGNIFICANT CHANGE UP
-  CIPROFLOXACIN: SIGNIFICANT CHANGE UP
-  ERTAPENEM: SIGNIFICANT CHANGE UP
-  GENTAMICIN: SIGNIFICANT CHANGE UP
-  IMIPENEM: SIGNIFICANT CHANGE UP
-  LEVOFLOXACIN: SIGNIFICANT CHANGE UP
-  MEROPENEM: SIGNIFICANT CHANGE UP
-  PIPERACILLIN/TAZOBACTAM: SIGNIFICANT CHANGE UP
-  TOBRAMYCIN: SIGNIFICANT CHANGE UP
-  TRIMETHOPRIM/SULFAMETHOXAZOLE: SIGNIFICANT CHANGE UP
ALBUMIN SERPL ELPH-MCNC: 2.6 G/DL — LOW (ref 3.3–5)
ALP SERPL-CCNC: 109 U/L — SIGNIFICANT CHANGE UP (ref 40–120)
ALT FLD-CCNC: 52 U/L DA — HIGH (ref 10–45)
ANION GAP SERPL CALC-SCNC: 8 MMOL/L — SIGNIFICANT CHANGE UP (ref 5–17)
AST SERPL-CCNC: 18 U/L — SIGNIFICANT CHANGE UP (ref 10–40)
BILIRUB SERPL-MCNC: 0.6 MG/DL — SIGNIFICANT CHANGE UP (ref 0.2–1.2)
BUN SERPL-MCNC: 10 MG/DL — SIGNIFICANT CHANGE UP (ref 7–23)
CALCIUM SERPL-MCNC: 8.3 MG/DL — LOW (ref 8.4–10.5)
CHLORIDE SERPL-SCNC: 99 MMOL/L — SIGNIFICANT CHANGE UP (ref 96–108)
CO2 SERPL-SCNC: 26 MMOL/L — SIGNIFICANT CHANGE UP (ref 22–31)
CREAT SERPL-MCNC: 0.6 MG/DL — SIGNIFICANT CHANGE UP (ref 0.5–1.3)
CULTURE RESULTS: SIGNIFICANT CHANGE UP
GLUCOSE SERPL-MCNC: 87 MG/DL — SIGNIFICANT CHANGE UP (ref 70–99)
HCT VFR BLD CALC: 38.2 % — LOW (ref 39–50)
HGB BLD-MCNC: 12.7 G/DL — LOW (ref 13–17)
MCHC RBC-ENTMCNC: 28.2 PG — SIGNIFICANT CHANGE UP (ref 27–34)
MCHC RBC-ENTMCNC: 33.4 GM/DL — SIGNIFICANT CHANGE UP (ref 32–36)
MCV RBC AUTO: 84.5 FL — SIGNIFICANT CHANGE UP (ref 80–100)
METHOD TYPE: SIGNIFICANT CHANGE UP
ORGANISM # SPEC MICROSCOPIC CNT: SIGNIFICANT CHANGE UP
PLATELET # BLD AUTO: 250 K/UL — SIGNIFICANT CHANGE UP (ref 150–400)
POTASSIUM SERPL-MCNC: 3.5 MMOL/L — SIGNIFICANT CHANGE UP (ref 3.5–5.3)
POTASSIUM SERPL-SCNC: 3.5 MMOL/L — SIGNIFICANT CHANGE UP (ref 3.5–5.3)
PROT SERPL-MCNC: 7.4 G/DL — SIGNIFICANT CHANGE UP (ref 6–8.3)
RBC # BLD: 4.52 M/UL — SIGNIFICANT CHANGE UP (ref 4.2–5.8)
RBC # FLD: 12.6 % — SIGNIFICANT CHANGE UP (ref 10.3–14.5)
SODIUM SERPL-SCNC: 133 MMOL/L — LOW (ref 135–145)
SPECIMEN SOURCE: SIGNIFICANT CHANGE UP
WBC # BLD: 9.5 K/UL — SIGNIFICANT CHANGE UP (ref 3.8–10.5)
WBC # FLD AUTO: 9.5 K/UL — SIGNIFICANT CHANGE UP (ref 3.8–10.5)

## 2018-08-15 RX ORDER — ACETAMINOPHEN 500 MG
650 TABLET ORAL ONCE
Qty: 0 | Refills: 0 | Status: COMPLETED | OUTPATIENT
Start: 2018-08-15 | End: 2018-08-15

## 2018-08-15 RX ADMIN — HYDROMORPHONE HYDROCHLORIDE 1 MILLIGRAM(S): 2 INJECTION INTRAMUSCULAR; INTRAVENOUS; SUBCUTANEOUS at 07:11

## 2018-08-15 RX ADMIN — DEXTROSE MONOHYDRATE, SODIUM CHLORIDE, AND POTASSIUM CHLORIDE 75 MILLILITER(S): 50; .745; 4.5 INJECTION, SOLUTION INTRAVENOUS at 01:20

## 2018-08-15 RX ADMIN — Medication 650 MILLIGRAM(S): at 16:10

## 2018-08-15 RX ADMIN — HYDROMORPHONE HYDROCHLORIDE 1 MILLIGRAM(S): 2 INJECTION INTRAMUSCULAR; INTRAVENOUS; SUBCUTANEOUS at 19:42

## 2018-08-15 RX ADMIN — MEROPENEM 100 MILLIGRAM(S): 1 INJECTION INTRAVENOUS at 21:32

## 2018-08-15 RX ADMIN — Medication 15 MILLIGRAM(S): at 06:48

## 2018-08-15 RX ADMIN — Medication 15 MILLIGRAM(S): at 07:04

## 2018-08-15 RX ADMIN — MEROPENEM 100 MILLIGRAM(S): 1 INJECTION INTRAVENOUS at 05:24

## 2018-08-15 RX ADMIN — HYDROMORPHONE HYDROCHLORIDE 1 MILLIGRAM(S): 2 INJECTION INTRAMUSCULAR; INTRAVENOUS; SUBCUTANEOUS at 21:29

## 2018-08-15 RX ADMIN — MEROPENEM 100 MILLIGRAM(S): 1 INJECTION INTRAVENOUS at 13:56

## 2018-08-15 RX ADMIN — ENOXAPARIN SODIUM 40 MILLIGRAM(S): 100 INJECTION SUBCUTANEOUS at 21:32

## 2018-08-15 RX ADMIN — HYDROMORPHONE HYDROCHLORIDE 1 MILLIGRAM(S): 2 INJECTION INTRAMUSCULAR; INTRAVENOUS; SUBCUTANEOUS at 04:04

## 2018-08-15 RX ADMIN — HYDROMORPHONE HYDROCHLORIDE 1 MILLIGRAM(S): 2 INJECTION INTRAMUSCULAR; INTRAVENOUS; SUBCUTANEOUS at 00:23

## 2018-08-15 RX ADMIN — HYDROMORPHONE HYDROCHLORIDE 1 MILLIGRAM(S): 2 INJECTION INTRAMUSCULAR; INTRAVENOUS; SUBCUTANEOUS at 01:21

## 2018-08-15 RX ADMIN — HYDROMORPHONE HYDROCHLORIDE 1 MILLIGRAM(S): 2 INJECTION INTRAMUSCULAR; INTRAVENOUS; SUBCUTANEOUS at 03:16

## 2018-08-15 RX ADMIN — PANTOPRAZOLE SODIUM 40 MILLIGRAM(S): 20 TABLET, DELAYED RELEASE ORAL at 06:45

## 2018-08-15 RX ADMIN — Medication 650 MILLIGRAM(S): at 09:30

## 2018-08-15 RX ADMIN — Medication 650 MILLIGRAM(S): at 19:44

## 2018-08-15 NOTE — PROGRESS NOTE ADULT - SUBJECTIVE AND OBJECTIVE BOX
pod#2    tmax 102  down now  feels better  J cavazos-minimal serous fluid  bile bag-serous now, not bile    c/o splinting    lungs-clear to p&a    abdomen-decreased tenderness    Complete Blood Count in AM (08.15.18 @ 06:24)    Mean Cell Volume: 84.5 fl    Complete Blood Count in AM (08.15.18 @ 06:24)    WBC Count: 9.5: Test repeated. K/uL    RBC Count: 4.52 M/uL    Hemoglobin: 12.7 g/dL    Hematocrit: 38.2 %    Mean Cell Volume: 84.5 fl    Mean Cell Hemoglobin: 28.2 pg    Mean Cell Hemoglobin Conc: 33.4 gm/dL    Red Cell Distrib Width: 12.6 %    Platelet Count - Automated: 250 K/uL    Comprehensive Metabolic Panel in AM (08.15.18 @ 06:24)    Sodium, Serum: 133 mmol/L    Potassium, Serum: 3.5 mmol/L    Chloride, Serum: 99 mmol/L    Carbon Dioxide, Serum: 26 mmol/L    Anion Gap, Serum: 8 mmol/L    Blood Urea Nitrogen, Serum: 10 mg/dL    Creatinine, Serum: 0.60 mg/dL    Glucose, Serum: 87 mg/dL    Calcium, Total Serum: 8.3 mg/dL    Protein Total, Serum: 7.4 g/dL    Albumin, Serum: 2.6 g/dL    Bilirubin Total, Serum: 0.6 mg/dL    Alkaline Phosphatase, Serum: 109 U/L    Aspartate Aminotransferase (AST/SGOT): 18 U/L    Alanine Aminotransferase (ALT/SGPT): 52 U/L DA

## 2018-08-15 NOTE — PROGRESS NOTE ADULT - SUBJECTIVE AND OBJECTIVE BOX
Patient is a 38y old  Male who presents with a chief complaint of fever (12 Aug 2018 22:32)      Patient seen and examined at bedside.    ALLERGIES:  cortisone (Unknown)    MEDICATIONS:  acetaminophen   Tablet 650 milliGRAM(s) Oral every 6 hours PRN  dextrose 5% + sodium chloride 0.9% with potassium chloride 20 mEq/L 1000 milliLiter(s) IV Continuous <Continuous>  enoxaparin Injectable 40 milliGRAM(s) SubCutaneous daily  ketorolac   Injectable 15 milliGRAM(s) IV Push every 6 hours PRN  meropenem  IVPB      meropenem  IVPB 1000 milliGRAM(s) IV Intermittent every 8 hours  morphine  - Injectable 2 milliGRAM(s) IV Push every 4 hours PRN  pantoprazole    Tablet 40 milliGRAM(s) Oral before breakfast    Vital Signs Last 24 Hrs  T(F): 102.1 (15 Aug 2018 12:47), Max: 102.1 (15 Aug 2018 12:47)  HR: 98 (15 Aug 2018 12:47) (93 - 98)  BP: 131/82 (15 Aug 2018 12:47) (105/62 - 131/82)  RR: 14 (15 Aug 2018 12:47) (14 - 18)  SpO2: 98% (15 Aug 2018 12:47) (96% - 98%)  I&O's Summary    14 Aug 2018 07:  -  15 Aug 2018 07:00  --------------------------------------------------------  IN: 2115 mL / OUT: 2600 mL / NET: -485 mL    15 Aug 2018 07:01  -  15 Aug 2018 13:26  --------------------------------------------------------  IN: 249 mL / OUT: 400 mL / NET: -151 mL        PHYSICAL EXAM:  General: NAD, A/O x 3  ENT: MMM  Neck: Supple, No JVD  Lungs: Clear to auscultation bilaterally  Cardio: RRR, S1/S2, No murmurs  Abdomen: Soft,mild tenderness + drain in place + bs  Extremities: No cyanosis, No edema    LABS:                        12.7   9.5   )-----------( 250      ( 15 Aug 2018 06:24 )             38.2     08-15    133  |  99  |  10  ----------------------------<  87  3.5   |  26  |  0.60    Ca    8.3      15 Aug 2018 06:24    TPro  7.4  /  Alb  2.6  /  TBili  0.6  /  DBili  x   /  AST  18  /  ALT  52  /  AlkPhos  109  08-15    eGFR if Non African American: 127 mL/min/1.73M2 (08-15-18 @ 06:24)  eGFR if : 147 mL/min/1.73M2 (08-15-18 @ 06:24)    PT/INR - ( 12 Aug 2018 20:14 )   PT: 13.2 sec;   INR: 1.19 ratio         PTT - ( 12 Aug 2018 20:14 )  PTT:27.8 sec  Lactate, Blood: 2.0 mmol/L ( @ 20:00)                CAPILLARY BLOOD GLUCOSE          Urinalysis Basic - ( 12 Aug 2018 21:13 )    Color: Yellow / Appearance: Clear / S.005 / pH: x  Gluc: x / Ketone: Negative  / Bili: Negative / Urobili: Negative   Blood: x / Protein: 30 mg/dL / Nitrite: Negative   Leuk Esterase: Negative / RBC: 0-4 /HPF / WBC 0-2 /HPF   Sq Epi: x / Non Sq Epi: Neg.-Few / Bacteria: Negative /HPF        Culture - Body Fluid with Gram Stain (collected 13 Aug 2018 19:46)  Source: .Body Fluid gallbladder  Gram Stain (14 Aug 2018 06:59):    polymorphonuclear leukocytes seen    Gram Negative Rods seen    by cytocentrifuge  Preliminary Report (15 Aug 2018 12:23):    Few Enterobacter cloacae/asburiae    Rare other gram negative shreyas    Culture - Urine (collected 12 Aug 2018 21:13)  Source: .Urine Clean Catch (Midstream)  Final Report (14 Aug 2018 10:55):    No growth    Culture - Blood (collected 12 Aug 2018 20:14)  Source: .Blood Blood-Peripheral  Preliminary Report (14 Aug 2018 01:01):    No growth to date.    Culture - Blood (collected 12 Aug 2018 20:14)  Source: .Blood Blood-Peripheral  Gram Stain (13 Aug 2018 14:23):    Growth in anaerobic bottle: Gram Negative Rods  Final Report (15 Aug 2018 08:12):    Growth in anaerobic bottle: Enterobacter cloacae/asburiae    "Due to technical problems, Proteus sp. will Not be reported as part of    the BCID panel until further notice"    ***Blood Panel PCR results on this specimen are available    approximately 3 hours after the Gram stain result.***    Gram stain, PCR, and/or culture results may not always    correspond due to difference in methodologies.    ************************************************************    This PCR assay was performed using [employer].    The following targets are tested for: Enterococcus,    vancomycin resistant enterococci, Listeria monocytogenes,    coagulase negative staphylococci, S. aureus,    methicillin resistant S. aureus, Streptococcus agalactiae    (Group B), S. pneumoniae, S.pyogenes (Group A),    Acinetobacter baumannii, Enterobacter cloacae, E. coli,    Klebsiella oxytoca, K. pneumoniae, Proteus sp.,    Serratia marcescens, Haemophilus influenzae,    Neisseria meningitidis, Pseudomonas aeruginosa, Candida    albicans, C. glabrata, C krusei, C parapsilosis,    C. tropicalis and the KPC resistance gene.  Organism: Blood Culture PCR  Enterobacter cloacae/absuria (15 Aug 2018 08:12)  Organism: Enterobacter cloacae/absuria (15 Aug 2018 08:12)      -  Amikacin: S <=8      -  Ampicillin: R <=2 These ampicillin results predict results for amoxicillin      -  Ampicillin/Sulbactam: R <=4/2      -  Aztreonam: S <=4      -  Cefazolin: R >16      -  Cefepime: S <=2      -  Cefoxitin: R >16      -  Ceftriaxone: S <=1 Enterobacter, Citrobacter, and Serratia may develop resistance during prolonged therapy      -  Ciprofloxacin: S <=0.5      -  Ertapenem: S <=0.5      -  Gentamicin: S <=1      -  Imipenem: S <=1      -  Levofloxacin: S <=1      -  Meropenem: S <=1      -  Piperacillin/Tazobactam: S <=8      -  Tobramycin: S <=2      -  Trimethoprim/Sulfamethoxazole: S <=0.5/9.5      Method Type: BAMBI  Organism: Blood Culture PCR (15 Aug 2018 08:12)      -  Enterobacter cloacae complex: Detec      Method Type: PCR        RADIOLOGY & ADDITIONAL TESTS:    Care Discussed with Consultants/Other Providers:

## 2018-08-15 NOTE — PROGRESS NOTE ADULT - SUBJECTIVE AND OBJECTIVE BOX
CC: f/u for cholecystitis, enterobacter bacteremia    Patient reports  he is having trouble taking a deep breath and has pain in the abdomen  REVIEW OF SYSTEMS:  All other review of systems negative (Comprehensive ROS)    Antimicrobials Day #  :2  meropenem  IVPB      meropenem  IVPB 1000 milliGRAM(s) IV Intermittent every 8 hours    Other Medications Reviewed    T(F): 101.2 (08-15-18 @ 09:25), Max: 101.2 (08-15-18 @ 09:25)  HR: 98 (08-15-18 @ 09:25)  BP: 105/62 (08-15-18 @ 09:25)  RR: 14 (08-15-18 @ 09:25)  SpO2: 97% (08-15-18 @ 09:25)  Wt(kg): --    PHYSICAL EXAM:  General: alert, no acute distress  Eyes:  anicteric, no conjunctival injection, no discharge  Oropharynx: no lesions or injection 	  Neck: supple, without adenopathy  Lungs: clear to auscultation  Heart: regular rate and rhythm; no murmur, rubs or gallops  Abdomen: drains in ruq with patrick tube, distended, nontender, without mass or organomegaly  Skin: no lesions  Extremities: no clubbing, cyanosis, or edema  Neurologic: alert, oriented, moves all extremities    LAB RESULTS:                        12.7   9.5   )-----------( 250      ( 15 Aug 2018 06:24 )             38.2     08-15    133<L>  |  99  |  10  ----------------------------<  87  3.5   |  26  |  0.60    Ca    8.3<L>      15 Aug 2018 06:24    TPro  7.4  /  Alb  2.6<L>  /  TBili  0.6  /  DBili  x   /  AST  18  /  ALT  52<H>  /  AlkPhos  109  08-15    LIVER FUNCTIONS - ( 15 Aug 2018 06:24 )  Alb: 2.6 g/dL / Pro: 7.4 g/dL / ALK PHOS: 109 U/L / ALT: 52 U/L DA / AST: 18 U/L / GGT: x             MICROBIOLOGY:  RECENT CULTURES:  08-13 @ 19:46 .Body Fluid gallbladder       polymorphonuclear leukocytes seen  Gram Negative Rods seen  by cytocentrifuge    08-12 @ 21:13 .Urine Clean Catch (Midstream)     No growth      08-12 @ 20:14 .Blood Blood-Peripheral Blood Culture PCR  Enterobacter cloacae/absuria    Growth in anaerobic bottle: Enterobacter cloacae/asburiae  "Due to technical problems, Proteus sp. will Not be reported as part of  the BCID panel until further notice"  ***Blood Panel PCR results on this specimen are available  approximately 3 hours after the Gram stain result.***  Gram stain, PCR, and/or culture results may not always  correspond due to difference in methodologies.  ************************************************************  This PCR assay was performed using [employer].  The following targets are tested for: Enterococcus,  vancomycin resistant enterococci, Listeria monocytogenes,  coagulase negative staphylococci, S. aureus,  methicillin resistant S. aureus, Streptococcus agalactiae  (Group B), S. pneumoniae, S.pyogenes (Group A),  Acinetobacter baumannii, Enterobacter cloacae, E. coli,  Klebsiella oxytoca, K. pneumoniae, Proteus sp.,  Serratia marcescens, Haemophilus influenzae,  Neisseria meningitidis, Pseudomonas aeruginosa, Candida  albicans, C. glabrata, C krusei, C parapsilosis,  C. tropicalis and the KPC resistance gene.    Growth in anaerobic bottle: Gram Negative Rods        RADIOLOGY REVIEWED:  < from: NM Hepatobiliary Scan w/wo Gall Bladder (08.13.18 @ 12:37) >  IMPRESSION: Abnormal morphine-augmented hepatobiliary scan: acute   cholecystitis.    < end of copied text >      < from: CT Abdomen and Pelvis w/ Oral Cont and w/ IV Cont (08.12.18 @ 03:23) >  IMPRESSION:    No evidence of small bowel obstruction or active bowel inflammation.      < end of copied text >    < from: Xray Chest 1 View- PORTABLE-Urgent (08.12.18 @ 23:06) >    IMPRESSION: No infiltrate.          < end of copied text >        Assessment:  Patient with acute cholecystitis and enterobacter  bacteremia, He had a patrick tube place during attempt to do lap patrick because gb could not be removed as it was too adherent to liver .   Plan:  continue meropenem  patrick tube for now  timing of cholecystectomy per dr valdez  follow up new blood cultures

## 2018-08-16 DIAGNOSIS — I48.91 UNSPECIFIED ATRIAL FIBRILLATION: ICD-10-CM

## 2018-08-16 DIAGNOSIS — E87.1 HYPO-OSMOLALITY AND HYPONATREMIA: ICD-10-CM

## 2018-08-16 LAB
ALBUMIN SERPL ELPH-MCNC: 2.4 G/DL — LOW (ref 3.3–5)
ALP SERPL-CCNC: 140 U/L — HIGH (ref 40–120)
ALT FLD-CCNC: 82 U/L DA — HIGH (ref 10–45)
ANION GAP SERPL CALC-SCNC: 7 MMOL/L — SIGNIFICANT CHANGE UP (ref 5–17)
AST SERPL-CCNC: 44 U/L — HIGH (ref 10–40)
BASOPHILS # BLD AUTO: 0.1 K/UL — SIGNIFICANT CHANGE UP (ref 0–0.2)
BASOPHILS NFR BLD AUTO: 0.9 % — SIGNIFICANT CHANGE UP (ref 0–2)
BILIRUB SERPL-MCNC: 0.6 MG/DL — SIGNIFICANT CHANGE UP (ref 0.2–1.2)
BUN SERPL-MCNC: 10 MG/DL — SIGNIFICANT CHANGE UP (ref 7–23)
CALCIUM SERPL-MCNC: 8.3 MG/DL — LOW (ref 8.4–10.5)
CHLORIDE SERPL-SCNC: 100 MMOL/L — SIGNIFICANT CHANGE UP (ref 96–108)
CO2 SERPL-SCNC: 26 MMOL/L — SIGNIFICANT CHANGE UP (ref 22–31)
CREAT SERPL-MCNC: 0.51 MG/DL — SIGNIFICANT CHANGE UP (ref 0.5–1.3)
EOSINOPHIL # BLD AUTO: 0.2 K/UL — SIGNIFICANT CHANGE UP (ref 0–0.5)
EOSINOPHIL NFR BLD AUTO: 2.1 % — SIGNIFICANT CHANGE UP (ref 0–6)
GLUCOSE SERPL-MCNC: 92 MG/DL — SIGNIFICANT CHANGE UP (ref 70–99)
HCT VFR BLD CALC: 35.7 % — LOW (ref 39–50)
HGB BLD-MCNC: 12.1 G/DL — LOW (ref 13–17)
LYMPHOCYTES # BLD AUTO: 1.4 K/UL — SIGNIFICANT CHANGE UP (ref 1–3.3)
LYMPHOCYTES # BLD AUTO: 18 % — SIGNIFICANT CHANGE UP (ref 13–44)
MCHC RBC-ENTMCNC: 28.5 PG — SIGNIFICANT CHANGE UP (ref 27–34)
MCHC RBC-ENTMCNC: 33.8 GM/DL — SIGNIFICANT CHANGE UP (ref 32–36)
MCV RBC AUTO: 84.3 FL — SIGNIFICANT CHANGE UP (ref 80–100)
MONOCYTES # BLD AUTO: 0.8 K/UL — SIGNIFICANT CHANGE UP (ref 0–0.9)
MONOCYTES NFR BLD AUTO: 9.6 % — SIGNIFICANT CHANGE UP (ref 2–14)
NEUTROPHILS # BLD AUTO: 5.5 K/UL — SIGNIFICANT CHANGE UP (ref 1.8–7.4)
NEUTROPHILS NFR BLD AUTO: 69.4 % — SIGNIFICANT CHANGE UP (ref 43–77)
PLATELET # BLD AUTO: 263 K/UL — SIGNIFICANT CHANGE UP (ref 150–400)
POTASSIUM SERPL-MCNC: 3.8 MMOL/L — SIGNIFICANT CHANGE UP (ref 3.5–5.3)
POTASSIUM SERPL-SCNC: 3.8 MMOL/L — SIGNIFICANT CHANGE UP (ref 3.5–5.3)
PROT SERPL-MCNC: 7.5 G/DL — SIGNIFICANT CHANGE UP (ref 6–8.3)
RBC # BLD: 4.23 M/UL — SIGNIFICANT CHANGE UP (ref 4.2–5.8)
RBC # FLD: 12.7 % — SIGNIFICANT CHANGE UP (ref 10.3–14.5)
SODIUM SERPL-SCNC: 133 MMOL/L — LOW (ref 135–145)
WBC # BLD: 8 K/UL — SIGNIFICANT CHANGE UP (ref 3.8–10.5)
WBC # FLD AUTO: 8 K/UL — SIGNIFICANT CHANGE UP (ref 3.8–10.5)

## 2018-08-16 PROCEDURE — 99233 SBSQ HOSP IP/OBS HIGH 50: CPT

## 2018-08-16 RX ORDER — SODIUM CHLORIDE 9 MG/ML
1000 INJECTION INTRAMUSCULAR; INTRAVENOUS; SUBCUTANEOUS
Qty: 0 | Refills: 0 | Status: DISCONTINUED | OUTPATIENT
Start: 2018-08-16 | End: 2018-08-18

## 2018-08-16 RX ADMIN — PANTOPRAZOLE SODIUM 40 MILLIGRAM(S): 20 TABLET, DELAYED RELEASE ORAL at 06:27

## 2018-08-16 RX ADMIN — OXYCODONE HYDROCHLORIDE 5 MILLIGRAM(S): 5 TABLET ORAL at 22:00

## 2018-08-16 RX ADMIN — OXYCODONE HYDROCHLORIDE 5 MILLIGRAM(S): 5 TABLET ORAL at 17:50

## 2018-08-16 RX ADMIN — Medication 325 MILLIGRAM(S): at 06:28

## 2018-08-16 RX ADMIN — MEROPENEM 100 MILLIGRAM(S): 1 INJECTION INTRAVENOUS at 13:24

## 2018-08-16 RX ADMIN — MEROPENEM 100 MILLIGRAM(S): 1 INJECTION INTRAVENOUS at 06:27

## 2018-08-16 RX ADMIN — HYDROMORPHONE HYDROCHLORIDE 1 MILLIGRAM(S): 2 INJECTION INTRAMUSCULAR; INTRAVENOUS; SUBCUTANEOUS at 06:27

## 2018-08-16 RX ADMIN — OXYCODONE HYDROCHLORIDE 5 MILLIGRAM(S): 5 TABLET ORAL at 16:52

## 2018-08-16 RX ADMIN — HYDROMORPHONE HYDROCHLORIDE 1 MILLIGRAM(S): 2 INJECTION INTRAMUSCULAR; INTRAVENOUS; SUBCUTANEOUS at 07:02

## 2018-08-16 RX ADMIN — DEXTROSE MONOHYDRATE, SODIUM CHLORIDE, AND POTASSIUM CHLORIDE 75 MILLILITER(S): 50; .745; 4.5 INJECTION, SOLUTION INTRAVENOUS at 04:44

## 2018-08-16 RX ADMIN — MEROPENEM 100 MILLIGRAM(S): 1 INJECTION INTRAVENOUS at 21:06

## 2018-08-16 RX ADMIN — OXYCODONE HYDROCHLORIDE 5 MILLIGRAM(S): 5 TABLET ORAL at 21:07

## 2018-08-16 RX ADMIN — ENOXAPARIN SODIUM 40 MILLIGRAM(S): 100 INJECTION SUBCUTANEOUS at 21:07

## 2018-08-16 NOTE — PROGRESS NOTE ADULT - SUBJECTIVE AND OBJECTIVE BOX
CC: f/u for acute cholecystitis/enterobacter bacteremia    Patient reports mild abdominal discomfort.toolerating diet    REVIEW OF SYSTEMS:  All other review of systems negative (Comprehensive ROS)    Antimicrobials Day #  day 3:  meropenem  IVPB      meropenem  IVPB 1000 milliGRAM(s) IV Intermittent every 8 hours    Other Medications Reviewed    T(F): 99.2 (08-16-18 @ 13:24), Max: 102.3 (08-15-18 @ 20:00)  HR: 87 (08-16-18 @ 13:24)  BP: 124/78 (08-16-18 @ 13:24)  RR: 16 (08-16-18 @ 13:24)  SpO2: 97% (08-16-18 @ 13:24)  Wt(kg): --    PHYSICAL EXAM:  General: alert, no acute distress  Eyes:  anicteric, no conjunctival injection, no discharge  Oropharynx: no lesions or injection 	  Neck: supple, without adenopathy  Lungs: clear to auscultation  Heart: regular rate and rhythm; no murmur, rubs or gallops  Abdomen: soft, mild distension nontender, cholecystostomy drain with sanguinous fluid  Skin: no lesions  Extremities: no clubbing, cyanosis, or edema  Neurologic: alert, oriented, moves all extremities    LAB RESULTS:                        12.1   8.0   )-----------( 263      ( 16 Aug 2018 06:18 )             35.7     08-16    133<L>  |  100  |  10  ----------------------------<  92  3.8   |  26  |  0.51    Ca    8.3<L>      16 Aug 2018 06:18    TPro  7.5  /  Alb  2.4<L>  /  TBili  0.6  /  DBili  x   /  AST  44<H>  /  ALT  82<H>  /  AlkPhos  140<H>  08-16    LIVER FUNCTIONS - ( 16 Aug 2018 06:18 )  Alb: 2.4 g/dL / Pro: 7.5 g/dL / ALK PHOS: 140 U/L / ALT: 82 U/L DA / AST: 44 U/L / GGT: x             MICROBIOLOGY:  RECENT CULTURES:  08-15 @ 06:24 .Blood Blood     No growth to date.      08-13 @ 19:46 .Body Fluid gallbladder Enterobacter cloacae/absuria    Few Enterobacter cloacae/asburiae  Rare other gram negative shreyas    polymorphonuclear leukocytes seen  Gram Negative Rods seen  by cytocentrifuge    08-13 @ 19:43 .Other None     Numerous Three or more mixed gram negative rods "Susceptibilities not  performed"      08-12 @ 21:13 .Urine Clean Catch (Midstream)     No growth        8/12 blood cultures with enterobacter        RADIOLOGY REVIEWED:    < from: CT Abdomen and Pelvis w/ Oral Cont and w/ IV Cont (08.12.18 @ 03:23) >  IMPRESSION:    No evidence of small bowel obstruction or active bowel inflammation.    HIDA c/w acute cholecystistis

## 2018-08-16 NOTE — PROGRESS NOTE ADULT - SUBJECTIVE AND OBJECTIVE BOX
pod#3  was febrile to 102.2 8pm last night  down now to 99    oob/chair    Heent-sclera anicteric    abdomen-slight distention and slight tenderness      labs:    Complete Blood Count + Automated Diff in AM (08.16.18 @ 06:18)    WBC Count: 8.0 K/uL    RBC Count: 4.23 M/uL    Hemoglobin: 12.1 g/dL    Hematocrit: 35.7 %    Mean Cell Volume: 84.3 fl    Mean Cell Hemoglobin: 28.5 pg    Mean Cell Hemoglobin Conc: 33.8 gm/dL    Red Cell Distrib Width: 12.7 %    Platelet Count - Automated: 263 K/uL    Auto Neutrophil #: 5.5 K/uL    Auto Lymphocyte #: 1.4 K/uL    Auto Monocyte #: 0.8 K/uL    Auto Eosinophil #: 0.2 K/uL    Auto Basophil #: 0.1 K/uL    Auto Neutrophil %: 69.4: Differential percentages must be correlated with absolute numbers for  clinical significance. %    Auto Lymphocyte %: 18.0 %    Auto Monocyte %: 9.6 %    Auto Eosinophil %: 2.1 %    Auto Basophil %: 0.9 %    Comprehensive Metabolic Panel in AM (08.16.18 @ 06:18)    Sodium, Serum: 133 mmol/L    Potassium, Serum: 3.8 mmol/L    Chloride, Serum: 100 mmol/L    Carbon Dioxide, Serum: 26 mmol/L    Anion Gap, Serum: 7 mmol/L    Blood Urea Nitrogen, Serum: 10 mg/dL    Creatinine, Serum: 0.51 mg/dL    Glucose, Serum: 92 mg/dL    Calcium, Total Serum: 8.3 mg/dL    Protein Total, Serum: 7.5 g/dL    Albumin, Serum: 2.4 g/dL    Bilirubin Total, Serum: 0.6 mg/dL    Alkaline Phosphatase, Serum: 140 U/L    Aspartate Aminotransferase (AST/SGOT): 44 U/L    Alanine Aminotransferase (ALT/SGPT): 82 U/L DA

## 2018-08-16 NOTE — PROGRESS NOTE ADULT - SUBJECTIVE AND OBJECTIVE BOX
Patient is a 38 year old male who presents with a chief complaint of fever (12 Aug 2018 22:32)    Patient reports fever last evening    MEDICATIONS  (STANDING):  dextrose 5% + sodium chloride 0.9% with potassium chloride 20 mEq/L 1000 milliLiter(s) (75 mL/Hr) IV Continuous <Continuous>  enoxaparin Injectable 40 milliGRAM(s) SubCutaneous daily  meropenem  IVPB      meropenem  IVPB 1000 milliGRAM(s) IV Intermittent every 8 hours  pantoprazole    Tablet 40 milliGRAM(s) Oral before breakfast    MEDICATIONS  (PRN):  acetaminophen   Tablet 650 milliGRAM(s) Oral every 6 hours PRN For Temp greater than 38 C (100.4 F)  acetaminophen   Tablet. 650 milliGRAM(s) Oral every 6 hours PRN Mild Pain (1 - 3)  HYDROmorphone  Injectable 1 milliGRAM(s) IV Push every 3 hours PRN Severe Pain (7 - 10)  ketorolac   Injectable 15 milliGRAM(s) IV Push every 6 hours PRN Moderate Pain (4 - 6)  morphine  - Injectable 2 milliGRAM(s) IV Push every 4 hours PRN Moderate Pain (4 - 6)  ondansetron Injectable 4 milliGRAM(s) IV Push every 6 hours PRN Nausea and/or Vomiting  oxyCODONE    IR 5 milliGRAM(s) Oral every 4 hours PRN Moderate Pain (4 - 6)      REVIEW OF SYSTEMS:  CONSTITUTIONAL: No fever, weight loss, or fatigue  EYES: No eye pain, visual disturbances, or discharge  ENMT:  No difficulty hearing, tinnitus, vertigo; No sinus or throat pain  NECK: No pain or stiffness  RESPIRATORY: No cough, wheezing, chills or hemoptysis; No shortness of breath  CARDIOVASCULAR: No chest pain, palpitations, dizziness, or leg swelling  GASTROINTESTINAL: No abdominal or epigastric pain. No nausea, vomiting, or hematemesis; No diarrhea or constipation. No melena or hematochezia.  GENITOURINARY: No dysuria, frequency, hematuria, or incontinence  NEUROLOGICAL: No headaches, memory loss, loss of strength, numbness, or tremors  SKIN: No itching, burning, rashes, or lesions   LYMPH NODES: No enlarged glands  ENDOCRINE: No heat or cold intolerance; No hair loss  MUSCULOSKELETAL: No joint pain or swelling; No muscle, back, or extremity pain  PSYCHIATRIC: No depression, anxiety, mood swings, or difficulty sleeping  HEME/LYMPH: No easy bruising, or bleeding gums  ALLERY AND IMMUNOLOGIC: No hives or eczema    PHYSICAL EXAM:  T(C): 37.1 (08-16-18 @ 08:11), Max: 39.1 (08-15-18 @ 20:00)  HR: 77 (08-16-18 @ 08:11) (77 - 98)  BP: 117/75 (08-16-18 @ 08:11) (105/62 - 131/82)  RR: 16 (08-16-18 @ 08:11) (14 - 16)  SpO2: 95% (08-16-18 @ 08:11) (95% - 98%)    I&O's Summary    15 Aug 2018 07:01  -  16 Aug 2018 07:00  --------------------------------------------------------  IN: 1999 mL / OUT: 1805 mL / NET: 194 mL        GENERAL: NAD, well-groomed, well-developed  HEAD:  Atraumatic, Normocephalic  EYES: EOMI, PERRL, conjunctiva and sclera clear  ENMT: No tonsillar erythema, exudates, or enlargement; Moist mucous membranes, Good dentition, No lesions  NECK: Supple, No JVD, Normal thyroid  NERVOUS SYSTEM:  Alert & Oriented X3, Good concentration; Motor Strength 5/5 B/L upper and lower extremities; DTRs 2+ intact and symmetric  CHEST/LUNG: Clear to ascultation bilaterally; No rales, rhonchi, wheezing, or rubs  HEART: Regular rate and rhythm; No murmurs, rubs, or gallops  ABDOMEN: Soft, Nontender, Nondistended; Bowel sounds present  EXTREMITIES:  2+ Peripheral Pulses, No clubbing, cyanosis, or edema  LYMPH: No lymphadenopathy noted  SKIN: No rashes or lesions    LABS:                        12.1   8.0   )-----------( 263      ( 16 Aug 2018 06:18 )             35.7     08-16    133<L>  |  100  |  10  ----------------------------<  92  3.8   |  26  |  0.51    Ca    8.3<L>      16 Aug 2018 06:18    TPro  7.5  /  Alb  2.4<L>  /  TBili  0.6  /  DBili  x   /  AST  44<H>  /  ALT  82<H>  /  AlkPhos  140<H>  08-16        CAPILLARY BLOOD GLUCOSE      RADIOLOGY & ADDITIONAL TESTS:    Imaging Personally Reviewed:  [x] YES  [ ] NO    Consultant(s) Notes Reviewed:  [x] YES  [ ] NO    Care Discussed with Consultants/Other Providers [x] YES  [ ] NO Patient is a 38 year old male who presents with a chief complaint of fever (12 Aug 2018 22:32)    Patient reports fever last evening (Tm 102.3 F), reports intermittent pain at site of surgery, better after receiving pain medications, and some sob with movement    MEDICATIONS  (STANDING):  dextrose 5% + sodium chloride 0.9% with potassium chloride 20 mEq/L 1000 milliLiter(s) (75 mL/Hr) IV Continuous <Continuous>  enoxaparin Injectable 40 milliGRAM(s) SubCutaneous daily  meropenem  IVPB      meropenem  IVPB 1000 milliGRAM(s) IV Intermittent every 8 hours  pantoprazole    Tablet 40 milliGRAM(s) Oral before breakfast    MEDICATIONS  (PRN):  acetaminophen   Tablet 650 milliGRAM(s) Oral every 6 hours PRN For Temp greater than 38 C (100.4 F)  acetaminophen   Tablet. 650 milliGRAM(s) Oral every 6 hours PRN Mild Pain (1 - 3)  HYDROmorphone  Injectable 1 milliGRAM(s) IV Push every 3 hours PRN Severe Pain (7 - 10)  ketorolac   Injectable 15 milliGRAM(s) IV Push every 6 hours PRN Moderate Pain (4 - 6)  morphine  - Injectable 2 milliGRAM(s) IV Push every 4 hours PRN Moderate Pain (4 - 6)  ondansetron Injectable 4 milliGRAM(s) IV Push every 6 hours PRN Nausea and/or Vomiting  oxyCODONE    IR 5 milliGRAM(s) Oral every 4 hours PRN Moderate Pain (4 - 6)      REVIEW OF SYSTEMS:  CONSTITUTIONAL: + fever,  fatigue  EYES: No eye pain, visual disturbances, or discharge  ENMT:  No difficulty hearing, tinnitus, vertigo; No sinus or throat pain  NECK: No pain or stiffness  RESPIRATORY: No cough, wheezing, chills or hemoptysis; shortness of breath with movement  CARDIOVASCULAR: No chest pain, palpitations, dizziness, or leg swelling  GASTROINTESTINAL:  intermittent abdominal pain. No nausea, vomiting, or hematemesis; No diarrhea or constipation. No melena or hematochezia.  GENITOURINARY: No dysuria, frequency, hematuria, or incontinence  NEUROLOGICAL: No headaches, memory loss, loss of strength, numbness, or tremors  SKIN: No itching, burning, rashes, or lesions   LYMPH NODES: No enlarged glands  ENDOCRINE: No heat or cold intolerance; No hair loss  MUSCULOSKELETAL: No joint pain or swelling; No muscle, back, or extremity pain  PSYCHIATRIC: No depression, anxiety, mood swings, or difficulty sleeping  HEME/LYMPH: No easy bruising, or bleeding gums  ALLERY AND IMMUNOLOGIC: No hives or eczema    PHYSICAL EXAM:  T(C): 37.1 (08-16-18 @ 08:11), Max: 39.1 (08-15-18 @ 20:00)  HR: 77 (08-16-18 @ 08:11) (77 - 98)  BP: 117/75 (08-16-18 @ 08:11) (105/62 - 131/82)  RR: 16 (08-16-18 @ 08:11) (14 - 16)  SpO2: 95% (08-16-18 @ 08:11) (95% - 98%)    I&O's Summary    15 Aug 2018 07:01  -  16 Aug 2018 07:00  --------------------------------------------------------  IN: 1999 mL / OUT: 1805 mL / NET: 194 mL        GENERAL: NAD, well-groomed, well-developed  HEAD:  Atraumatic, Normocephalic  EYES: EOMI, PERRL, conjunctiva and sclera clear  ENMT: No tonsillar erythema, exudates, or enlargement; Moist mucous membranes, Good dentition, No lesions  NECK: Supple, No JVD, Normal thyroid  NERVOUS SYSTEM:  Alert & Oriented X3, Good concentration;  CHEST/LUNG: Clear to ascultation bilaterally; No rales, rhonchi, wheezing, or rubs  HEART: Regular rate and rhythm; S1/S2, No murmurs, rubs, or gallops  ABDOMEN: Soft, mild tenderness, + drain in place; Bowel sounds present  EXTREMITIES:  2+ Peripheral Pulses, No clubbing, cyanosis, or edema  LYMPH: No lymphadenopathy noted  SKIN: No rashes or lesions      LABS:                        12.1   8.0   )-----------( 263      ( 16 Aug 2018 06:18 )             35.7     08-16    133<L>  |  100  |  10  ----------------------------<  92  3.8   |  26  |  0.51    Ca    8.3<L>      16 Aug 2018 06:18    TPro  7.5  /  Alb  2.4<L>  /  TBili  0.6  /  DBili  x   /  AST  44<H>  /  ALT  82<H>  /  AlkPhos  140<H>  08-16        CAPILLARY BLOOD GLUCOSE      RADIOLOGY & ADDITIONAL TESTS:    Imaging Personally Reviewed:  [x] YES  [ ] NO    Consultant(s) Notes Reviewed:  [x] YES  [ ] NO    Care Discussed with Consultants/Other Providers [x] YES  [ ] NO Patient is a 38 year old male who presents with a chief complaint of fever (12 Aug 2018 22:32)    Patient reports fever last evening (Tm 102.3 F), reports intermittent pain at site of surgery, better after receiving pain medications, and some sob with movement    MEDICATIONS  (STANDING):  dextrose 5% + sodium chloride 0.9% with potassium chloride 20 mEq/L 1000 milliLiter(s) (75 mL/Hr) IV Continuous <Continuous>  enoxaparin Injectable 40 milliGRAM(s) SubCutaneous daily  meropenem  IVPB      meropenem  IVPB 1000 milliGRAM(s) IV Intermittent every 8 hours  pantoprazole    Tablet 40 milliGRAM(s) Oral before breakfast    MEDICATIONS  (PRN):  acetaminophen   Tablet 650 milliGRAM(s) Oral every 6 hours PRN For Temp greater than 38 C (100.4 F)  acetaminophen   Tablet. 650 milliGRAM(s) Oral every 6 hours PRN Mild Pain (1 - 3)  HYDROmorphone  Injectable 1 milliGRAM(s) IV Push every 3 hours PRN Severe Pain (7 - 10)  ketorolac   Injectable 15 milliGRAM(s) IV Push every 6 hours PRN Moderate Pain (4 - 6)  morphine  - Injectable 2 milliGRAM(s) IV Push every 4 hours PRN Moderate Pain (4 - 6)  ondansetron Injectable 4 milliGRAM(s) IV Push every 6 hours PRN Nausea and/or Vomiting  oxyCODONE    IR 5 milliGRAM(s) Oral every 4 hours PRN Moderate Pain (4 - 6)      REVIEW OF SYSTEMS:  CONSTITUTIONAL: + fever,  fatigue  EYES: No eye pain, visual disturbances, or discharge  ENMT:  No difficulty hearing, tinnitus, vertigo; No sinus or throat pain  NECK: No pain or stiffness  RESPIRATORY: No cough, wheezing, chills or hemoptysis; shortness of breath with movement  CARDIOVASCULAR: No chest pain, palpitations, dizziness, or leg swelling  GASTROINTESTINAL:  intermittent abdominal pain. No nausea, vomiting, or hematemesis; No diarrhea or constipation. No melena or hematochezia.  GENITOURINARY: No dysuria, frequency, hematuria, or incontinence  NEUROLOGICAL: No headaches, memory loss, loss of strength, numbness, or tremors  SKIN: No itching, burning, rashes, or lesions   LYMPH NODES: No enlarged glands  ENDOCRINE: No heat or cold intolerance; No hair loss  MUSCULOSKELETAL: No joint pain or swelling; No muscle, back, or extremity pain  PSYCHIATRIC: No depression, anxiety, mood swings, or difficulty sleeping  HEME/LYMPH: No easy bruising, or bleeding gums  ALLERY AND IMMUNOLOGIC: No hives or eczema    PHYSICAL EXAM:  T(C): 37.1 (08-16-18 @ 08:11), Max: 39.1 (08-15-18 @ 20:00)  HR: 77 (08-16-18 @ 08:11) (77 - 98)  BP: 117/75 (08-16-18 @ 08:11) (105/62 - 131/82)  RR: 16 (08-16-18 @ 08:11) (14 - 16)  SpO2: 95% (08-16-18 @ 08:11) (95% - 98%)    I&O's Summary    15 Aug 2018 07:01  -  16 Aug 2018 07:00  --------------------------------------------------------  IN: 1999 mL / OUT: 1805 mL / NET: 194 mL        GENERAL: NAD, well-groomed, well-developed  HEAD:  Atraumatic, Normocephalic  EYES: EOMI, PERRL, conjunctiva and sclera clear  ENMT: No tonsillar erythema, exudates, or enlargement; Moist mucous membranes, Good dentition, No lesions  NECK: Supple, No JVD, Normal thyroid  NERVOUS SYSTEM:  Alert & Oriented X3, Good concentration;  CHEST/LUNG: Clear to ascultation bilaterally; No rales, rhonchi, wheezing, or rubs  HEART: Regular rate and rhythm; S1/S2, No murmurs, rubs, or gallops  ABDOMEN: Soft, mild tenderness, + drain in place, blood tinged fluid; Bowel sounds present  EXTREMITIES:  2+ Peripheral Pulses, No clubbing, cyanosis, or edema  LYMPH: No lymphadenopathy noted  SKIN: No rashes or lesions      LABS:                        12.1   8.0   )-----------( 263      ( 16 Aug 2018 06:18 )             35.7     08-16    133<L>  |  100  |  10  ----------------------------<  92  3.8   |  26  |  0.51    Ca    8.3<L>      16 Aug 2018 06:18    TPro  7.5  /  Alb  2.4<L>  /  TBili  0.6  /  DBili  x   /  AST  44<H>  /  ALT  82<H>  /  AlkPhos  140<H>  08-16      RADIOLOGY & ADDITIONAL TESTS:    Imaging Personally Reviewed:  [x] YES  [ ] NO    Consultant(s) Notes Reviewed:  [x] YES  [ ] NO    Care Discussed with Consultants/Other Providers [x] YES  [ ] NO

## 2018-08-16 NOTE — PROGRESS NOTE ADULT - PROBLEM SELECTOR PLAN 4
dvt ppx: lovenox new, with periods of bradycardia on telemetry, intermittent sob, cardiology eval with Dr. Méndez

## 2018-08-17 LAB
ALBUMIN SERPL ELPH-MCNC: 2.7 G/DL — LOW (ref 3.3–5)
ALP SERPL-CCNC: 155 U/L — HIGH (ref 40–120)
ALT FLD-CCNC: 90 U/L DA — HIGH (ref 10–45)
ANION GAP SERPL CALC-SCNC: 7 MMOL/L — SIGNIFICANT CHANGE UP (ref 5–17)
AST SERPL-CCNC: 35 U/L — SIGNIFICANT CHANGE UP (ref 10–40)
BASOPHILS # BLD AUTO: 0.1 K/UL — SIGNIFICANT CHANGE UP (ref 0–0.2)
BASOPHILS NFR BLD AUTO: 1.3 % — SIGNIFICANT CHANGE UP (ref 0–2)
BILIRUB SERPL-MCNC: 0.4 MG/DL — SIGNIFICANT CHANGE UP (ref 0.2–1.2)
BUN SERPL-MCNC: 15 MG/DL — SIGNIFICANT CHANGE UP (ref 7–23)
CALCIUM SERPL-MCNC: 8.6 MG/DL — SIGNIFICANT CHANGE UP (ref 8.4–10.5)
CHLORIDE SERPL-SCNC: 100 MMOL/L — SIGNIFICANT CHANGE UP (ref 96–108)
CO2 SERPL-SCNC: 29 MMOL/L — SIGNIFICANT CHANGE UP (ref 22–31)
CREAT SERPL-MCNC: 0.69 MG/DL — SIGNIFICANT CHANGE UP (ref 0.5–1.3)
EOSINOPHIL # BLD AUTO: 0.3 K/UL — SIGNIFICANT CHANGE UP (ref 0–0.5)
EOSINOPHIL NFR BLD AUTO: 3.8 % — SIGNIFICANT CHANGE UP (ref 0–6)
GLUCOSE SERPL-MCNC: 97 MG/DL — SIGNIFICANT CHANGE UP (ref 70–99)
HCT VFR BLD CALC: 40.4 % — SIGNIFICANT CHANGE UP (ref 39–50)
HGB BLD-MCNC: 13.8 G/DL — SIGNIFICANT CHANGE UP (ref 13–17)
LYMPHOCYTES # BLD AUTO: 1.8 K/UL — SIGNIFICANT CHANGE UP (ref 1–3.3)
LYMPHOCYTES # BLD AUTO: 19.4 % — SIGNIFICANT CHANGE UP (ref 13–44)
MAGNESIUM SERPL-MCNC: 2.3 MG/DL — SIGNIFICANT CHANGE UP (ref 1.6–2.6)
MCHC RBC-ENTMCNC: 28.9 PG — SIGNIFICANT CHANGE UP (ref 27–34)
MCHC RBC-ENTMCNC: 34.2 GM/DL — SIGNIFICANT CHANGE UP (ref 32–36)
MCV RBC AUTO: 84.5 FL — SIGNIFICANT CHANGE UP (ref 80–100)
MONOCYTES # BLD AUTO: 0.7 K/UL — SIGNIFICANT CHANGE UP (ref 0–0.9)
MONOCYTES NFR BLD AUTO: 7.8 % — SIGNIFICANT CHANGE UP (ref 2–14)
NEUTROPHILS # BLD AUTO: 6.1 K/UL — SIGNIFICANT CHANGE UP (ref 1.8–7.4)
NEUTROPHILS NFR BLD AUTO: 67.7 % — SIGNIFICANT CHANGE UP (ref 43–77)
PHOSPHATE SERPL-MCNC: 3.9 MG/DL — SIGNIFICANT CHANGE UP (ref 2.5–4.5)
PLATELET # BLD AUTO: 324 K/UL — SIGNIFICANT CHANGE UP (ref 150–400)
POTASSIUM SERPL-MCNC: 3.8 MMOL/L — SIGNIFICANT CHANGE UP (ref 3.5–5.3)
POTASSIUM SERPL-SCNC: 3.8 MMOL/L — SIGNIFICANT CHANGE UP (ref 3.5–5.3)
PROT SERPL-MCNC: 7.8 G/DL — SIGNIFICANT CHANGE UP (ref 6–8.3)
RBC # BLD: 4.78 M/UL — SIGNIFICANT CHANGE UP (ref 4.2–5.8)
RBC # FLD: 12.6 % — SIGNIFICANT CHANGE UP (ref 10.3–14.5)
SODIUM SERPL-SCNC: 136 MMOL/L — SIGNIFICANT CHANGE UP (ref 135–145)
WBC # BLD: 9 K/UL — SIGNIFICANT CHANGE UP (ref 3.8–10.5)
WBC # FLD AUTO: 9 K/UL — SIGNIFICANT CHANGE UP (ref 3.8–10.5)

## 2018-08-17 PROCEDURE — 99233 SBSQ HOSP IP/OBS HIGH 50: CPT

## 2018-08-17 PROCEDURE — 71046 X-RAY EXAM CHEST 2 VIEWS: CPT | Mod: 26

## 2018-08-17 RX ADMIN — MEROPENEM 100 MILLIGRAM(S): 1 INJECTION INTRAVENOUS at 05:11

## 2018-08-17 RX ADMIN — SODIUM CHLORIDE 75 MILLILITER(S): 9 INJECTION INTRAMUSCULAR; INTRAVENOUS; SUBCUTANEOUS at 19:36

## 2018-08-17 RX ADMIN — MEROPENEM 100 MILLIGRAM(S): 1 INJECTION INTRAVENOUS at 13:35

## 2018-08-17 RX ADMIN — MEROPENEM 100 MILLIGRAM(S): 1 INJECTION INTRAVENOUS at 23:12

## 2018-08-17 RX ADMIN — ENOXAPARIN SODIUM 40 MILLIGRAM(S): 100 INJECTION SUBCUTANEOUS at 23:12

## 2018-08-17 RX ADMIN — PANTOPRAZOLE SODIUM 40 MILLIGRAM(S): 20 TABLET, DELAYED RELEASE ORAL at 05:11

## 2018-08-17 NOTE — PROGRESS NOTE ADULT - SUBJECTIVE AND OBJECTIVE BOX
CC: f/u for Enterobacter bacteremia 2/2 acute cholecystitis     Patient reports that he still had some abdominal pain but tolerating regular diet now    REVIEW OF SYSTEMS:  All other review of systems negative (Comprehensive ROS) except as above     Antimicrobials Day #  : 4   meropenem  IVPB      meropenem  IVPB 1000 milliGRAM(s) IV Intermittent every 8 hours    Other Medications Reviewed    T(F): 98.1 (08-17-18 @ 16:20), Max: 99.1 (08-16-18 @ 20:22)  HR: 79 (08-17-18 @ 16:20)  BP: 117/68 (08-17-18 @ 16:20)  RR: 15 (08-17-18 @ 16:20)  SpO2: 97% (08-17-18 @ 16:20)  Wt(kg): --    PHYSICAL EXAM:  General: alert, no acute distress  Eyes:  anicteric, no conjunctival injection, no discharge  Neck: supple, without adenopathy  Lungs: clear to auscultation  Heart: regular rate and rhythm; no murmur, rubs or gallops  Abdomen: soft, obese, TTP, midline incision - C/D/I, REMI drain & patrick tune present   Extremities: no edema  Neurologic: alert, oriented, moves all extremities    LAB RESULTS:                        13.8   9.0   )-----------( 324      ( 17 Aug 2018 06:40 )             40.4     08-17    136  |  100  |  15  ----------------------------<  97  3.8   |  29  |  0.69    Ca    8.6      17 Aug 2018 06:40  Phos  3.9     08-17  Mg     2.3     08-17    TPro  7.8  /  Alb  2.7<L>  /  TBili  0.4  /  DBili  x   /  AST  35  /  ALT  90<H>  /  AlkPhos  155<H>  08-17    LIVER FUNCTIONS - ( 17 Aug 2018 06:40 )  Alb: 2.7 g/dL / Pro: 7.8 g/dL / ALK PHOS: 155 U/L / ALT: 90 U/L DA / AST: 35 U/L / GGT: x             MICROBIOLOGY:  RECENT CULTURES:  08-15 @ 06:24 .Blood     No growth to date.      08-13 @ 19:46 .Body Fluid gallbladder Enterobacter cloacae/absuria  Enterobacter cloacae/absuria    Few Enterobacter cloacae/asburiae  Rare Enterobacter cloacae/asburiae #2    polymorphonuclear leukocytes seen  Gram Negative Rods seen  by cytocentrifuge    08-13 @ 19:43 .Other None     Numerous Three or more mixed gram negative rods "Susceptibilities not  performed"      08-12 @ 21:13 .Urine Clean Catch (Midstream)     No growth      08-12 @ 20:14 .Blood Blood-Peripheral Blood Culture PCR  Enterobacter cloacae/absuria    Growth in anaerobic bottle: Gram Negative Rods        RADIOLOGY REVIEWED:

## 2018-08-17 NOTE — PROGRESS NOTE ADULT - SUBJECTIVE AND OBJECTIVE BOX
pod#4  afebrile    oob/chair    Heent-sclera anicteric    abdomen-soft, decreased distention, decreased tenderness      labs:    Complete Blood Count + Automated Diff in AM (08.17.18 @ 06:40)    WBC Count: 9.0 K/uL    RBC Count: 4.78 M/uL    Hemoglobin: 13.8 g/dL    Hematocrit: 40.4 %    Mean Cell Volume: 84.5 fl    Mean Cell Hemoglobin: 28.9 pg    Mean Cell Hemoglobin Conc: 34.2 gm/dL    Red Cell Distrib Width: 12.6 %    Platelet Count - Automated: 324 K/uL    Auto Neutrophil #: 6.1 K/uL    Auto Lymphocyte #: 1.8 K/uL    Auto Monocyte #: 0.7 K/uL    Auto Eosinophil #: 0.3 K/uL    Auto Basophil #: 0.1 K/uL    Auto Neutrophil %: 67.7: Differential percentages must be correlated with absolute numbers for  clinical significance. %    Auto Lymphocyte %: 19.4 %    Auto Monocyte %: 7.8 %    Auto Eosinophil %: 3.8 %    Auto Basophil %: 1.3 %    Comprehensive Metabolic Panel in AM (08.17.18 @ 06:40)    Sodium, Serum: 136 mmol/L    Potassium, Serum: 3.8 mmol/L    Chloride, Serum: 100 mmol/L    Carbon Dioxide, Serum: 29 mmol/L    Anion Gap, Serum: 7 mmol/L    Blood Urea Nitrogen, Serum: 15 mg/dL    Creatinine, Serum: 0.69 mg/dL    Glucose, Serum: 97 mg/dL    Calcium, Total Serum: 8.6 mg/dL    Protein Total, Serum: 7.8 g/dL    Albumin, Serum: 2.7 g/dL    Bilirubin Total, Serum: 0.4 mg/dL    Alkaline Phosphatase, Serum: 155 U/L    Aspartate Aminotransferase (AST/SGOT): 35 U/L    Alanine Aminotransferase (ALT/SGPT): 90 U/L DA           < from: Xray Chest 2 View  IMPRESSION: There is band type opacity identified within the medial   aspect of the right lower lung field, likely related to subsegmental   atelectatic change; underlying focal infiltrate in that region cannot be   excluded.     < end of copied text >    < end of copied text >

## 2018-08-17 NOTE — PROGRESS NOTE ADULT - SUBJECTIVE AND OBJECTIVE BOX
Patient is a 38y old  Male who presents with a chief complaint of fever (12 Aug 2018 22:32)      Patient seen and examined at bedside.    ALLERGIES:  cortisone (Unknown)    MEDICATIONS:  acetaminophen   Tablet 650 milliGRAM(s) Oral every 6 hours PRN  enoxaparin Injectable 40 milliGRAM(s) SubCutaneous daily  ketorolac   Injectable 15 milliGRAM(s) IV Push every 6 hours PRN  meropenem  IVPB      meropenem  IVPB 1000 milliGRAM(s) IV Intermittent every 8 hours  morphine  - Injectable 2 milliGRAM(s) IV Push every 4 hours PRN  pantoprazole    Tablet 40 milliGRAM(s) Oral before breakfast  sodium chloride 0.9%. 1000 milliLiter(s) IV Continuous <Continuous>    Vital Signs Last 24 Hrs  T(F): 98.6 (17 Aug 2018 08:55), Max: 99.2 (16 Aug 2018 13:24)  HR: 80 (17 Aug 2018 08:55) (80 - 90)  BP: 129/83 (17 Aug 2018 08:55) (117/79 - 130/77)  RR: 14 (17 Aug 2018 08:55) (14 - 16)  SpO2: 94% (17 Aug 2018 08:55) (94% - 100%)  I&O's Summary    16 Aug 2018 07:01  -  17 Aug 2018 07:00  --------------------------------------------------------  IN: 550 mL / OUT: 95 mL / NET: 455 mL    17 Aug 2018 07:01  -  17 Aug 2018 12:42  --------------------------------------------------------  IN: 240 mL / OUT: 0 mL / NET: 240 mL        PHYSICAL EXAM:  General: NAD, alert, oriented x 3  Neck: Supple, No JVD  Lungs: Clear to auscultation bilaterally  Cardio: RRR, S1/S2, No murmurs  Abdomen: Soft, Nontender, Nondistended; Bowel sounds present  + drain, cholecystostomy +  Extremities: No clubbing, cyanosis, or edema    LABS:                        13.8   9.0   )-----------( 324      ( 17 Aug 2018 06:40 )             40.4     08-17    136  |  100  |  15  ----------------------------<  97  3.8   |  29  |  0.69    Ca    8.6      17 Aug 2018 06:40  Phos  3.9     08-17  Mg     2.3     08-17    TPro  7.8  /  Alb  2.7  /  TBili  0.4  /  DBili  x   /  AST  35  /  ALT  90  /  AlkPhos  155  08-17    eGFR if Non African American: 120 mL/min/1.73M2 (08-17-18 @ 06:40)  eGFR if : 139 mL/min/1.73M2 (08-17-18 @ 06:40)    CAPILLARY BLOOD GLUCOSE    Culture - Blood (collected 15 Aug 2018 06:24)  Source: .Blood Blood  Preliminary Report (16 Aug 2018 12:01):    No growth to date.    Culture - Blood (collected 15 Aug 2018 06:24)  Source: .Blood Blood  Preliminary Report (16 Aug 2018 12:01):    No growth to date.    Culture - Body Fluid with Gram Stain (collected 13 Aug 2018 19:46)  Source: .Body Fluid gallbladder  Gram Stain (14 Aug 2018 06:59):    polymorphonuclear leukocytes seen    Gram Negative Rods seen    by cytocentrifuge  Preliminary Report (17 Aug 2018 11:03):    Few Enterobacter cloacae/asburiae    Rare Enterobacter cloacae/asburiae #2  Organism: Enterobacter cloacae/absuria  Enterobacter cloacae/absuria (17 Aug 2018 10:01)  Organism: Enterobacter cloacae/absuria (17 Aug 2018 10:01)      -  Amikacin: S <=8      -  Amoxicillin/Clavulanic Acid: R >16/8      -  Ampicillin: R 16 These ampicillin results predict results for amoxicillin      -  Ampicillin/Sulbactam: R <=4/2      -  Aztreonam: S <=4      -  Cefazolin: R >16      -  Cefepime: S <=2      -  Cefoxitin: R >16      -  Ceftriaxone: S <=1 Enterobacter, Citrobacter, and Serratia may develop resistance during prolonged therapy      -  Ciprofloxacin: S <=0.5      -  Ertapenem: S <=0.5      -  Gentamicin: S <=1      -  Imipenem: S <=1      -  Levofloxacin: S <=1      -  Meropenem: S <=1      -  Piperacillin/Tazobactam: S <=8      -  Tobramycin: S <=2      -  Trimethoprim/Sulfamethoxazole: S <=0.5/9.5      Method Type: BAMBI  Organism: Enterobacter cloacae/absuria (16 Aug 2018 09:20)      -  Amikacin: S <=8      -  Amoxicillin/Clavulanic Acid: R >16/8      -  Ampicillin: R 8 These ampicillin results predict results for amoxicillin      -  Ampicillin/Sulbactam: R 8/4      -  Aztreonam: S <=4      -  Cefazolin: R >16      -  Cefepime: S <=2      -  Cefoxitin: R >16      -  Ceftriaxone: S <=1 Enterobacter, Citrobacter, and Serratia may develop resistance during prolonged therapy      -  Ciprofloxacin: S <=0.5      -  Ertapenem: S <=0.5      -  Gentamicin: S <=1      -  Imipenem: S <=1      -  Levofloxacin: S <=1      -  Meropenem: S <=1      -  Piperacillin/Tazobactam: S <=8      -  Tobramycin: S <=2      -  Trimethoprim/Sulfamethoxazole: S <=0.5/9.5      Method Type: BAMBI    Culture - Other (collected 13 Aug 2018 19:43)  Source: .Other None  Final Report (16 Aug 2018 09:15):    Numerous Three or more mixed gram negative rods "Susceptibilities not    performed"    Culture - Urine (collected 12 Aug 2018 21:13)  Source: .Urine Clean Catch (Midstream)  Final Report (14 Aug 2018 10:55):    No growth    Culture - Blood (collected 12 Aug 2018 20:14)  Source: .Blood Blood-Peripheral  Preliminary Report (14 Aug 2018 01:01):    No growth to date.    Culture - Blood (collected 12 Aug 2018 20:14)  Source: .Blood Blood-Peripheral  Gram Stain (13 Aug 2018 14:23):    Growth in anaerobic bottle: Gram Negative Rods  Final Report (15 Aug 2018 08:12):    Growth in anaerobic bottle: Enterobacter cloacae/asburiae    "Due to technical problems, Proteus sp. will Not be reported as part of    the BCID panel until further notice"    ***Blood Panel PCR results on this specimen are available    approximately 3 hours after the Gram stain result.***    Gram stain, PCR, and/or culture results may not always    correspond due to difference in methodologies.    ************************************************************    This PCR assay was performed using [employer].    The following targets are tested for: Enterococcus,    vancomycin resistant enterococci, Listeria monocytogenes,    coagulase negative staphylococci, S. aureus,    methicillin resistant S. aureus, Streptococcus agalactiae    (Group B), S. pneumoniae, S.pyogenes (Group A),    Acinetobacter baumannii, Enterobacter cloacae, E. coli,    Klebsiella oxytoca, K. pneumoniae, Proteus sp.,    Serratia marcescens, Haemophilus influenzae,    Neisseria meningitidis, Pseudomonas aeruginosa, Candida    albicans, C. glabrata, C krusei, C parapsilosis,    C. tropicalis and the KPC resistance gene.  Organism: Blood Culture PCR  Enterobacter cloacae/absuria (15 Aug 2018 08:12)  Organism: Enterobacter cloacae/absuria (15 Aug 2018 08:12)      -  Amikacin: S <=8      -  Ampicillin: R <=2 These ampicillin results predict results for amoxicillin      -  Ampicillin/Sulbactam: R <=4/2      -  Aztreonam: S <=4      -  Cefazolin: R >16      -  Cefepime: S <=2      -  Cefoxitin: R >16      -  Ceftriaxone: S <=1 Enterobacter, Citrobacter, and Serratia may develop resistance during prolonged therapy      -  Ciprofloxacin: S <=0.5      -  Ertapenem: S <=0.5      -  Gentamicin: S <=1      -  Imipenem: S <=1      -  Levofloxacin: S <=1      -  Meropenem: S <=1      -  Piperacillin/Tazobactam: S <=8      -  Tobramycin: S <=2      -  Trimethoprim/Sulfamethoxazole: S <=0.5/9.5      Method Type: BAMBI  Organism: Blood Culture PCR (15 Aug 2018 08:12)      -  Enterobacter cloacae complex: Detec      Method Type: PCR        RADIOLOGY & ADDITIONAL TESTS:    Care Discussed with Consultants/Other Providers:

## 2018-08-18 ENCOUNTER — TRANSCRIPTION ENCOUNTER (OUTPATIENT)
Age: 38
End: 2018-08-18

## 2018-08-18 VITALS
DIASTOLIC BLOOD PRESSURE: 66 MMHG | SYSTOLIC BLOOD PRESSURE: 126 MMHG | TEMPERATURE: 99 F | RESPIRATION RATE: 14 BRPM | HEART RATE: 80 BPM | OXYGEN SATURATION: 99 %

## 2018-08-18 DIAGNOSIS — K80.00 CALCULUS OF GALLBLADDER WITH ACUTE CHOLECYSTITIS WITHOUT OBSTRUCTION: ICD-10-CM

## 2018-08-18 LAB
ALBUMIN SERPL ELPH-MCNC: 2.7 G/DL — LOW (ref 3.3–5)
ALP SERPL-CCNC: 141 U/L — HIGH (ref 40–120)
ALT FLD-CCNC: 81 U/L DA — HIGH (ref 10–45)
ANION GAP SERPL CALC-SCNC: 4 MMOL/L — LOW (ref 5–17)
AST SERPL-CCNC: 29 U/L — SIGNIFICANT CHANGE UP (ref 10–40)
BASOPHILS # BLD AUTO: 0.1 K/UL — SIGNIFICANT CHANGE UP (ref 0–0.2)
BASOPHILS NFR BLD AUTO: 0.8 % — SIGNIFICANT CHANGE UP (ref 0–2)
BILIRUB SERPL-MCNC: 0.4 MG/DL — SIGNIFICANT CHANGE UP (ref 0.2–1.2)
BUN SERPL-MCNC: 13 MG/DL — SIGNIFICANT CHANGE UP (ref 7–23)
CALCIUM SERPL-MCNC: 8.8 MG/DL — SIGNIFICANT CHANGE UP (ref 8.4–10.5)
CHLORIDE SERPL-SCNC: 100 MMOL/L — SIGNIFICANT CHANGE UP (ref 96–108)
CO2 SERPL-SCNC: 30 MMOL/L — SIGNIFICANT CHANGE UP (ref 22–31)
CREAT SERPL-MCNC: 0.6 MG/DL — SIGNIFICANT CHANGE UP (ref 0.5–1.3)
CULTURE RESULTS: SIGNIFICANT CHANGE UP
EOSINOPHIL # BLD AUTO: 0.5 K/UL — SIGNIFICANT CHANGE UP (ref 0–0.5)
EOSINOPHIL NFR BLD AUTO: 4.6 % — SIGNIFICANT CHANGE UP (ref 0–6)
GLUCOSE SERPL-MCNC: 95 MG/DL — SIGNIFICANT CHANGE UP (ref 70–99)
HCT VFR BLD CALC: 40 % — SIGNIFICANT CHANGE UP (ref 39–50)
HGB BLD-MCNC: 12.8 G/DL — LOW (ref 13–17)
LYMPHOCYTES # BLD AUTO: 2 K/UL — SIGNIFICANT CHANGE UP (ref 1–3.3)
LYMPHOCYTES # BLD AUTO: 20.2 % — SIGNIFICANT CHANGE UP (ref 13–44)
MCHC RBC-ENTMCNC: 27 PG — SIGNIFICANT CHANGE UP (ref 27–34)
MCHC RBC-ENTMCNC: 32 GM/DL — SIGNIFICANT CHANGE UP (ref 32–36)
MCV RBC AUTO: 84.4 FL — SIGNIFICANT CHANGE UP (ref 80–100)
MONOCYTES # BLD AUTO: 0.7 K/UL — SIGNIFICANT CHANGE UP (ref 0–0.9)
MONOCYTES NFR BLD AUTO: 6.6 % — SIGNIFICANT CHANGE UP (ref 1–9)
NEUTROPHILS # BLD AUTO: 6.7 K/UL — SIGNIFICANT CHANGE UP (ref 1.8–7.4)
NEUTROPHILS NFR BLD AUTO: 67.7 % — SIGNIFICANT CHANGE UP (ref 43–77)
PLATELET # BLD AUTO: 351 K/UL — SIGNIFICANT CHANGE UP (ref 150–400)
POTASSIUM SERPL-MCNC: 3.9 MMOL/L — SIGNIFICANT CHANGE UP (ref 3.5–5.3)
POTASSIUM SERPL-SCNC: 3.9 MMOL/L — SIGNIFICANT CHANGE UP (ref 3.5–5.3)
PROT SERPL-MCNC: 7.7 G/DL — SIGNIFICANT CHANGE UP (ref 6–8.3)
RBC # BLD: 4.74 M/UL — SIGNIFICANT CHANGE UP (ref 4.2–5.8)
RBC # FLD: 12.6 % — SIGNIFICANT CHANGE UP (ref 10.3–14.5)
SODIUM SERPL-SCNC: 134 MMOL/L — LOW (ref 135–145)
SPECIMEN SOURCE: SIGNIFICANT CHANGE UP
WBC # BLD: 9.9 K/UL — SIGNIFICANT CHANGE UP (ref 3.8–10.5)
WBC # FLD AUTO: 9.9 K/UL — SIGNIFICANT CHANGE UP (ref 3.8–10.5)

## 2018-08-18 PROCEDURE — 71045 X-RAY EXAM CHEST 1 VIEW: CPT

## 2018-08-18 PROCEDURE — 93005 ELECTROCARDIOGRAM TRACING: CPT

## 2018-08-18 PROCEDURE — 97161 PT EVAL LOW COMPLEX 20 MIN: CPT

## 2018-08-18 PROCEDURE — 85730 THROMBOPLASTIN TIME PARTIAL: CPT

## 2018-08-18 PROCEDURE — 87075 CULTR BACTERIA EXCEPT BLOOD: CPT

## 2018-08-18 PROCEDURE — 83735 ASSAY OF MAGNESIUM: CPT

## 2018-08-18 PROCEDURE — A9537: CPT

## 2018-08-18 PROCEDURE — 85027 COMPLETE CBC AUTOMATED: CPT

## 2018-08-18 PROCEDURE — 99285 EMERGENCY DEPT VISIT HI MDM: CPT | Mod: 25

## 2018-08-18 PROCEDURE — 84100 ASSAY OF PHOSPHORUS: CPT

## 2018-08-18 PROCEDURE — 87150 DNA/RNA AMPLIFIED PROBE: CPT

## 2018-08-18 PROCEDURE — 85610 PROTHROMBIN TIME: CPT

## 2018-08-18 PROCEDURE — 81001 URINALYSIS AUTO W/SCOPE: CPT

## 2018-08-18 PROCEDURE — 87070 CULTURE OTHR SPECIMN AEROBIC: CPT

## 2018-08-18 PROCEDURE — 78226 HEPATOBILIARY SYSTEM IMAGING: CPT

## 2018-08-18 PROCEDURE — C1729: CPT

## 2018-08-18 PROCEDURE — 71046 X-RAY EXAM CHEST 2 VIEWS: CPT

## 2018-08-18 PROCEDURE — 82150 ASSAY OF AMYLASE: CPT

## 2018-08-18 PROCEDURE — 87086 URINE CULTURE/COLONY COUNT: CPT

## 2018-08-18 PROCEDURE — 87205 SMEAR GRAM STAIN: CPT

## 2018-08-18 PROCEDURE — 80053 COMPREHEN METABOLIC PANEL: CPT

## 2018-08-18 PROCEDURE — 96374 THER/PROPH/DIAG INJ IV PUSH: CPT

## 2018-08-18 PROCEDURE — 83690 ASSAY OF LIPASE: CPT

## 2018-08-18 PROCEDURE — 83605 ASSAY OF LACTIC ACID: CPT

## 2018-08-18 PROCEDURE — 87186 SC STD MICRODIL/AGAR DIL: CPT

## 2018-08-18 PROCEDURE — 99233 SBSQ HOSP IP/OBS HIGH 50: CPT

## 2018-08-18 PROCEDURE — 87040 BLOOD CULTURE FOR BACTERIA: CPT

## 2018-08-18 RX ORDER — MOXIFLOXACIN HYDROCHLORIDE TABLETS, 400 MG 400 MG/1
1 TABLET, FILM COATED ORAL
Qty: 18 | Refills: 0 | OUTPATIENT
Start: 2018-08-18 | End: 2018-08-26

## 2018-08-18 RX ORDER — METRONIDAZOLE 500 MG
1 TABLET ORAL
Qty: 18 | Refills: 0 | OUTPATIENT
Start: 2018-08-18 | End: 2018-08-26

## 2018-08-18 RX ADMIN — MEROPENEM 100 MILLIGRAM(S): 1 INJECTION INTRAVENOUS at 05:41

## 2018-08-18 RX ADMIN — PANTOPRAZOLE SODIUM 40 MILLIGRAM(S): 20 TABLET, DELAYED RELEASE ORAL at 05:41

## 2018-08-18 RX ADMIN — SODIUM CHLORIDE 75 MILLILITER(S): 9 INJECTION INTRAMUSCULAR; INTRAVENOUS; SUBCUTANEOUS at 08:32

## 2018-08-18 RX ADMIN — MEROPENEM 100 MILLIGRAM(S): 1 INJECTION INTRAVENOUS at 13:41

## 2018-08-18 RX ADMIN — OXYCODONE HYDROCHLORIDE 5 MILLIGRAM(S): 5 TABLET ORAL at 08:32

## 2018-08-18 RX ADMIN — OXYCODONE HYDROCHLORIDE 5 MILLIGRAM(S): 5 TABLET ORAL at 10:57

## 2018-08-18 NOTE — DISCHARGE NOTE ADULT - NS AS ACTIVITY OBS
No Heavy lifting/straining/Walking-Outdoors allowed/Showering allowed/Walking-Indoors allowed/Do not drive or operate machinery

## 2018-08-18 NOTE — DISCHARGE NOTE ADULT - PLAN OF CARE
to alleviate pain acute/chronic cholecystitis S/p Laparoscopy /cholecystostomy tube for decompression

## 2018-08-18 NOTE — DIETITIAN INITIAL EVALUATION ADULT. - NS AS NUTRI INTERV MEALS SNACK
Recommend advance to regular diet when medically/surgically feasible. Defer diet/fluid consistencies to medical team.

## 2018-08-18 NOTE — PHYSICAL THERAPY INITIAL EVALUATION ADULT - GENERAL OBSERVATIONS, REHAB EVAL
Pt encountered supine in bed with HOB elevated, + patrick tube in place, family members present at bedside, agreeable to participate

## 2018-08-18 NOTE — PROGRESS NOTE ADULT - PROVIDER SPECIALTY LIST ADULT
Hospitalist
Infectious Disease
Surgery
Hospitalist
Hospitalist

## 2018-08-18 NOTE — DIETITIAN INITIAL EVALUATION ADULT. - ENERGY NEEDS
Ht: 65 inches Wt: 239.4 pounds BMI: 37.7 kg/m2 IBW: 136 (+/-10%) 176%IBW  Pertinent information: Pt 37 y/o M with PMH: cholelithiasis, admitted with fever, biliary colic, abdominal pain, vomiting, now S/P cholecystostomy, drainage, irrigation (08/13), sepsis, bacteremia.   Noted +1 generalized edema. Skin: no noted pressure injuries as per documentation.

## 2018-08-18 NOTE — DISCHARGE NOTE ADULT - PATIENT PORTAL LINK FT
You can access the [facility], offered by [facility], by registering with the following website: [email or web address]

## 2018-08-18 NOTE — DIETITIAN INITIAL EVALUATION ADULT. - NS AS NUTRI INTERV ED CONTENT
Provided education on low fat nutrition therapy and advancement to regular diet once medically feasible and as tolerated. Reviewed food high in fat and foods recommended within therapeutic diet.

## 2018-08-18 NOTE — DISCHARGE NOTE ADULT - MEDICATION SUMMARY - MEDICATIONS TO TAKE
I will START or STAY ON the medications listed below when I get home from the hospital:    Flagyl 500 mg oral tablet  -- 1 tab(s) by mouth 2 times a day MDD:2  -- Do not drink alcoholic beverages when taking this medication.  Finish all this medication unless otherwise directed by prescriber.  May discolor urine or feces.    -- Indication: For Antibiotic     oxyCODONE-acetaminophen 5 mg-325 mg oral tablet  -- 1 tab(s) by mouth every 6 hours, As Needed -for severe pain MDD:4   -- Caution federal law prohibits the transfer of this drug to any person other  than the person for whom it was prescribed.  May cause drowsiness.  Alcohol may intensify this effect.  Use care when operating dangerous machinery.  This prescription cannot be refilled.  This product contains acetaminophen.  Do not use  with any other product containing acetaminophen to prevent possible liver damage.  Using more of this medication than prescribed may cause serious breathing problems.    -- Indication: For Pain     Cipro 500 mg oral tablet  -- 1 tab(s) by mouth 2 times a day x 9 days MDD:2  -- Avoid prolonged or excessive exposure to direct and/or artificial sunlight while taking this medication.  Check with your doctor before becoming pregnant.  Do not take dairy products, antacids, or iron preparations within one hour of this medication.  Finish all this medication unless otherwise directed by prescriber.  Medication should be taken with plenty of water.    -- Indication: For Antibiotic

## 2018-08-18 NOTE — DISCHARGE NOTE ADULT - CARE PROVIDER_API CALL
Santino Pritchett), Surgery  [address] [place] [address]  Phone: [phone]  Fax: [phone]

## 2018-08-18 NOTE — DIETITIAN INITIAL EVALUATION ADULT. - ADHERENCE
Pt reports not following any type of diet or restriction at home. Pt reports not taking any vitamins or nutritional supplements PTA./n/a

## 2018-08-18 NOTE — PROGRESS NOTE ADULT - ASSESSMENT
37 yo m pmh cholelithiasis pw bilary colic s/p cholecystostomy   8/13 +empyema drain in place + gram neg bacteremia
37 yo m pmh cholelithiasis pw bilary colic s/p cholecystostomy on 8/13 +empyema drain in place, enterobacter bacteremia on meropenem
38 M with cholelithiasis and now with fever, r/o acute cholecystitis.    awaiting HIDA scan
39 yo m pmh cholelithiasis pw bilary colic s/p cholecystostomy on 8/13 +empyema drain in place, enterobacter bacteremia on IV meropenem
Acute cholecystitis with a few years of biliary colic.  Aborted cholecystectomy secondary to adhesions.  Enterobacter bacteremia   He is slowly improving  Continue meropenem for now  Will convert to an oral regimen when ready for discharge  He will need 2 weeks of antibiotics  Fevers should moderate
Acute cholecystitis with a few years of biliary colic.  Aborted cholecystectomy secondary to adhesions.  Enterobacter bacteremia   He is slowly improving  Continue meropenem for now, oral options exist  Will convert to an oral regimen when ready for discharge  He will need 2 weeks of antibiotics
Acute cholecystitis with a few years of biliary colic.  Aborted cholecystectomy secondary to adhesions.  Enterobacter bacteremia  controlled  patrick tube to remain in place  He is slowly improving  Will switch to cipro 500 BID and metronidazole(flagyl) 500 BID for additional 9 days  No ID objection to discharge planning
doing ok  minimal non bilious drainage into bile bag  wbc normalized    discussed with dr stockton-will need continue dintravenous antibioitics    will need to discharge home with large malecot catheter(will need to stay in till we do cholecystectomy)    apprised patient and his mother of current status and plan
doing well      likely discharge tomorrow morning  left sided drain to be removed    malecot drain to stay in
doing well    VIVIANE cavazos removed    discharge home with malecot  po antibioiticsx 1 week  followup with dr valdez next week
looks better  advance diet  cbc in am  ambulate
looks well s/p tube cholecystostomy    plan:  oob/ambulate  start diet and advance as tolerated  labs in am 8/15/2018
39 yo m pmh cholelithiasis pw bilary colic s/p cholecystostomy on 8/13 +empyema drain in place, enterobacter bacteremia on meropenem
39 yo m pmh cholelithiasis pw bilary colic s/p cholecystostomy   8/13 +empyema drain in place enterobacter bacteremia on meropenem

## 2018-08-18 NOTE — PROGRESS NOTE ADULT - PROBLEM SELECTOR PROBLEM 1
Acute cholecystitis due to biliary calculus
Calculus of gallbladder with cholecystitis without biliary obstruction, unspecified cholecystitis acuity
Acute cholecystitis due to biliary calculus
Acute cholecystitis due to biliary calculus

## 2018-08-18 NOTE — PROGRESS NOTE ADULT - ATTENDING COMMENTS
Plan of care discussed with patient, and agrees, all questions answered.   Mita Pelayo MD
Plan of care discussed with patient, and agrees, all questions answered.   Mita Pelayo MD

## 2018-08-18 NOTE — DISCHARGE NOTE ADULT - HOSPITAL COURSE
38 year old male presented to the ER several times c/o biliary colic /abdominal pain.  Patient had been discharged to home but returned with shaking chills, nausea, vomiting, fever.  ultrasound and CT showed cholelithiasis .  Pt was admitted , NPO, IV antibiotics.  Patient spiked to temp of 102 and had an elevated WBC.  He was taken to OR and he underwent a Laparoscopy / Insertion of Malecot tube ( cholecystostomy tube for decompression of the gallbladder ).  Patient did well post op.  His WBC has normalized.  He is now afebrile, ambulating, voiding, tolerating a soft diet.  and draining adequately from his   cholecystostomy tube.  Post op instructions were given to patient and he will follow up with Dr Pritchett on Thursday Aug. 23  I have spoken to I.D. and  patient to go home on Cipro and Flagyl for a total of 9 days.

## 2018-08-18 NOTE — PROGRESS NOTE ADULT - SUBJECTIVE AND OBJECTIVE BOX
CC: f/u for cholecystitis and enterobacter bacteremia    Patient reports less abdominal pain, tolerating a diet    REVIEW OF SYSTEMS:  All other review of systems negative (Comprehensive ROS)    Antimicrobials Day #  :day 5  meropenem  IVPB      meropenem  IVPB 1000 milliGRAM(s) IV Intermittent every 8 hours    Other Medications Reviewed    T(F): 99 (08-18-18 @ 05:32), Max: 99.6 (08-17-18 @ 21:12)  HR: 79 (08-18-18 @ 05:32)  BP: 117/80 (08-18-18 @ 05:32)  RR: 14 (08-18-18 @ 05:32)  SpO2: 100% (08-18-18 @ 05:32)  Wt(kg): --    PHYSICAL EXAM:  General: alert, no acute distress  Eyes:  anicteric, no conjunctival injection, no discharge  Oropharynx: no lesions or injection 	  Neck: supple, without adenopathy  Lungs: clear to auscultation  Heart: regular rate and rhythm; no murmur, rubs or gallops  Abdomen: soft, mild distension,rt sided patrick tube in place  Skin: no lesions  Extremities: no clubbing, cyanosis, or edema  Neurologic: alert, oriented, moves all extremities    LAB RESULTS:                        12.8   9.9   )-----------( 351      ( 18 Aug 2018 06:26 )             40.0     08-18    134<L>  |  100  |  13  ----------------------------<  95  3.9   |  30  |  0.60    Ca    8.8      18 Aug 2018 06:26  Phos  3.9     08-17  Mg     2.3     08-17    TPro  7.7  /  Alb  2.7<L>  /  TBili  0.4  /  DBili  x   /  AST  29  /  ALT  81<H>  /  AlkPhos  141<H>  08-18    LIVER FUNCTIONS - ( 18 Aug 2018 06:26 )  Alb: 2.7 g/dL / Pro: 7.7 g/dL / ALK PHOS: 141 U/L / ALT: 81 U/L DA / AST: 29 U/L / GGT: x             MICROBIOLOGY:  RECENT CULTURES:  08-15 @ 06:24 .Blood Blood     No growth to date.      08-13 @ 19:46 .Body Fluid gallbladder Enterobacter cloacae/absuria  Enterobacter cloacae/absuria    Few Enterobacter cloacae/asburiae  Rare Enterobacter cloacae/asburiae #2    polymorphonuclear leukocytes seen  Gram Negative Rods seen  by cytocentrifuge    08-13 @ 19:43 .Other None     Numerous Three or more mixed gram negative rods "Susceptibilities not  performed"          RADIOLOGY REVIEWED:

## 2018-08-18 NOTE — PHYSICAL THERAPY INITIAL EVALUATION ADULT - IMPAIRMENTS CONTRIBUTING TO GAIT DEVIATIONS, PT EVAL
pain/Pt. unable to walk more than 5 feet without RW for support due to c/o increased abdominal pain and pressure

## 2018-08-18 NOTE — PROGRESS NOTE ADULT - PROBLEM SELECTOR PLAN 2
enterobacter bacteremia on meropenem leukocytosis improved pt still spiking temp gu surgery regarding when to have cholecystectomy
on zosyn fu ID regarding further recommendations
enterobacter bacteremia on IV meropenem leukocytosis resolved   ID following
enterobacter bacteremia on meropenem leukocytosis resolved   ID following
secondary to above
enterobacter bacteremia on meropenem leukocytosis resolved pt still spiking temp f/u ID surgery

## 2018-08-18 NOTE — DIETITIAN INITIAL EVALUATION ADULT. - NS FNS WEIGHT CHANGE REASON
Pt reports progressive weight gain x 1 year PTA due to increased PO intake at night, unable to recall UBW or pounds gained. Current weight as per flow sheets (08/13) 227 pounds -> (08/17) 239.4 pounds -?accuracy of weight fluctuations likely due to fluid shifts.

## 2018-08-18 NOTE — PROGRESS NOTE ADULT - PROBLEM SELECTOR PLAN 1
sp cholecystostomy 8/13 POD # 1 cw pain management added morphine pain was uncontrolled in am fu surgery
Suspected early Sepsis secondary to cholecystitis  HIDA scan pending  IVFs, NPO
sp cholecystostomy 8/13  pain control
sp cholecystostomy 8/13 POD # 4   pain control
sp cholecystostomy 8/13 POD # 3 cw pain management   pain was uncontrolled in am fu surgery
sp cholecystostomy 8/13 POD # 2 cw pain management added morphine pain was uncontrolled in am fu surgery

## 2018-08-18 NOTE — PROGRESS NOTE ADULT - PROBLEM SELECTOR PROBLEM 3
Prophylactic measure
Calculus of gallbladder with acute cholecystitis without obstruction
Prophylactic measure
Hyponatremia

## 2018-08-18 NOTE — PROGRESS NOTE ADULT - PROBLEM SELECTOR PROBLEM 2
Bacteremia due to Gram-negative bacteria
Bacteremia due to Gram-negative bacteria
Sepsis, due to unspecified organism
Bacteremia due to Gram-negative bacteria
Bacteremia due to Gram-negative bacteria
Sepsis, due to unspecified organism
Bacteremia due to Gram-negative bacteria

## 2018-08-18 NOTE — DIETITIAN INITIAL EVALUATION ADULT. - OTHER INFO
Pt seen for length of stay initial assessment. Pt reports good appetite and PO intake. Noted % PO intake as per flow sheets. Pt on soft diet, reports tolerating current diet consistency. Pt denies nausea, vomiting, diarrhea, or constipation, reports last BM today (08/18). Pt amenable for education.

## 2018-08-18 NOTE — PHYSICAL THERAPY INITIAL EVALUATION ADULT - ADDITIONAL COMMENTS
Pt. lives with parents in a second floor apartment.  Pt reports 4 KONG with HR, then 16 stairs with HR to second floor.  Pt drives, does not wear glasses, has NO DME at home.

## 2018-08-18 NOTE — PROGRESS NOTE ADULT - SUBJECTIVE AND OBJECTIVE BOX
pod#5  temp 99    just had bowel movement    feels "well"    lungs-clear to p and a      abdomen-wounds clean and dry  benign      wbc 9  h/h 12/40  67% neutrophils

## 2018-08-18 NOTE — DIETITIAN INITIAL EVALUATION ADULT. - PROBLEM SELECTOR PLAN 1
with fever and sepsis (+fever, tachy, +wbc)  cont IV Zosyn.   surgery consult.  HIDA scan in AM  IVFs, NPO

## 2018-08-18 NOTE — DISCHARGE NOTE ADULT - CARE PLAN
Principal Discharge DX:	Acute cholecystitis due to biliary calculus  Goal:	to alleviate pain  Assessment and plan of treatment:	acute/chronic cholecystitis S/p Laparoscopy /cholecystostomy tube for decompression

## 2018-08-20 LAB
CULTURE RESULTS: SIGNIFICANT CHANGE UP
CULTURE RESULTS: SIGNIFICANT CHANGE UP
SPECIMEN SOURCE: SIGNIFICANT CHANGE UP
SPECIMEN SOURCE: SIGNIFICANT CHANGE UP

## 2018-08-21 PROBLEM — K80.10 CALCULUS OF GALLBLADDER WITH CHRONIC CHOLECYSTITIS WITHOUT OBSTRUCTION: Chronic | Status: ACTIVE | Noted: 2018-08-12

## 2018-08-24 ENCOUNTER — APPOINTMENT (OUTPATIENT)
Dept: SURGERY | Facility: CLINIC | Age: 38
End: 2018-08-24
Payer: COMMERCIAL

## 2018-08-24 VITALS
WEIGHT: 218 LBS | SYSTOLIC BLOOD PRESSURE: 108 MMHG | BODY MASS INDEX: 34.21 KG/M2 | DIASTOLIC BLOOD PRESSURE: 68 MMHG | TEMPERATURE: 98.5 F | HEIGHT: 67 IN | HEART RATE: 91 BPM | OXYGEN SATURATION: 98 %

## 2018-08-24 PROCEDURE — 99024 POSTOP FOLLOW-UP VISIT: CPT

## 2018-09-14 ENCOUNTER — EMERGENCY (EMERGENCY)
Facility: HOSPITAL | Age: 38
LOS: 1 days | Discharge: HOME | End: 2018-09-14
Attending: EMERGENCY MEDICINE | Admitting: EMERGENCY MEDICINE
Payer: COMMERCIAL

## 2018-09-14 VITALS
DIASTOLIC BLOOD PRESSURE: 79 MMHG | OXYGEN SATURATION: 98 % | WEIGHT: 240.08 LBS | RESPIRATION RATE: 12 BRPM | SYSTOLIC BLOOD PRESSURE: 131 MMHG | TEMPERATURE: 98 F | HEIGHT: 64 IN | HEART RATE: 79 BPM

## 2018-09-14 VITALS
HEART RATE: 75 BPM | DIASTOLIC BLOOD PRESSURE: 68 MMHG | TEMPERATURE: 99 F | OXYGEN SATURATION: 98 % | SYSTOLIC BLOOD PRESSURE: 117 MMHG | RESPIRATION RATE: 17 BRPM

## 2018-09-14 PROCEDURE — 99282 EMERGENCY DEPT VISIT SF MDM: CPT

## 2018-09-14 NOTE — ED PROVIDER NOTE - PHYSICAL EXAMINATION
AAOx3  Heart: s1/s2, RRR  Lung: CTA b/l  Abd: soft, NT/ND, no rebound or guarding. + Cholecystostomy tube noted with loss suture

## 2018-09-14 NOTE — ED PROVIDER NOTE - ATTENDING CONTRIBUTION TO CARE
Eval with NIKKY Barajas. Drain is draining. There is no abd tenderness. Pt is well appearing. Pt stable for discharge. Dr Pritchett will f/u outpt. He went over drainage care with patient. I performed a face to face bedside interview with patient regarding history of present illness, review of symptoms and past medical history. I completed an independent physical exam.  I have discussed the patient's plan of care with Physician Assistant (PA). I agree with note as stated above, having amended the EMR as needed to reflect my findings.   This includes History of Present Illness, HIV, Past Medical/Surgical/Family/Social History, Allergies and Home Medications, Review of Systems, Physical Exam, and any Progress Notes during the time I functioned as the attending physician for this patient.

## 2018-09-14 NOTE — ED PROVIDER NOTE - OBJECTIVE STATEMENT
39 y/o M with PMH of cholecystostomy on 8/13 presents to the ED with c/o the stitch holding to the tube to the skin "popped" last night. He denies abd pain, n/v, f/c, CP, SOB, excessive drainage

## 2018-09-14 NOTE — CHART NOTE - NSCHARTNOTEFT_GEN_A_CORE
called to ER to assess the tube cholecystostomy(skin suture came loose).  patient looks well  vitals stable  Heent-sclera anicteric  abdomen-benign  tube site slight erosion    tube repositioned and secured    patient to see me in the office next week.

## 2018-09-14 NOTE — ED ADULT NURSE NOTE - CHPI ED NUR SYMPTOMS NEG
no bleeding at site/no redness/no chills/no blood in mucus/no rectal pain/no pain/no vomiting/no purulent drainage/no fever

## 2018-09-14 NOTE — ED ADULT NURSE NOTE - INTEGUMENTARY WDL
Color consistent with ethnicity/race, warm, dry intact, resilient. Pt post op Gallbladder surgery no erythema or edema  or warmth noted at surrounding area.

## 2018-09-14 NOTE — ED PROVIDER NOTE - MEDICAL DECISION MAKING DETAILS
37 y/o M with PMH of cholecystostomy on 8/13 presents to the ED with c/o the stitch holding to the tube to the skin "popped" last night.   Plan: 37 y/o M with PMH of cholecystostomy on 8/13 presents to the ED with c/o the stitch holding to the tube to the skin "popped" last night.   Plan: Dr. Pritchett his surgeon was called

## 2018-09-21 ENCOUNTER — APPOINTMENT (OUTPATIENT)
Dept: SURGERY | Facility: CLINIC | Age: 38
End: 2018-09-21
Payer: COMMERCIAL

## 2018-09-21 ENCOUNTER — APPOINTMENT (OUTPATIENT)
Dept: RADIOLOGY | Facility: HOSPITAL | Age: 38
End: 2018-09-21

## 2018-09-21 VITALS
HEIGHT: 67 IN | DIASTOLIC BLOOD PRESSURE: 80 MMHG | HEART RATE: 82 BPM | TEMPERATURE: 98.6 F | WEIGHT: 218 LBS | BODY MASS INDEX: 34.21 KG/M2 | OXYGEN SATURATION: 99 % | SYSTOLIC BLOOD PRESSURE: 122 MMHG

## 2018-09-21 PROCEDURE — 99024 POSTOP FOLLOW-UP VISIT: CPT

## 2018-09-25 ENCOUNTER — OUTPATIENT (OUTPATIENT)
Dept: OUTPATIENT SERVICES | Facility: HOSPITAL | Age: 38
LOS: 1 days | End: 2018-09-25
Payer: COMMERCIAL

## 2018-09-25 DIAGNOSIS — K81.0 ACUTE CHOLECYSTITIS: ICD-10-CM

## 2018-09-25 PROCEDURE — 47531 INJECTION FOR CHOLANGIOGRAM: CPT

## 2018-10-19 ENCOUNTER — APPOINTMENT (OUTPATIENT)
Dept: SURGERY | Facility: CLINIC | Age: 38
End: 2018-10-19

## 2018-10-26 ENCOUNTER — APPOINTMENT (OUTPATIENT)
Dept: SURGERY | Facility: CLINIC | Age: 38
End: 2018-10-26
Payer: SELF-PAY

## 2018-10-26 VITALS
DIASTOLIC BLOOD PRESSURE: 80 MMHG | OXYGEN SATURATION: 98 % | BODY MASS INDEX: 37.04 KG/M2 | WEIGHT: 236 LBS | SYSTOLIC BLOOD PRESSURE: 124 MMHG | TEMPERATURE: 98.4 F | HEIGHT: 67 IN | HEART RATE: 79 BPM

## 2018-10-26 PROCEDURE — 99213 OFFICE O/P EST LOW 20 MIN: CPT | Mod: 24

## 2018-11-19 ENCOUNTER — APPOINTMENT (OUTPATIENT)
Dept: FAMILY MEDICINE | Facility: HOSPITAL | Age: 38
End: 2018-11-19

## 2018-11-19 ENCOUNTER — OUTPATIENT (OUTPATIENT)
Dept: OUTPATIENT SERVICES | Facility: HOSPITAL | Age: 38
LOS: 1 days | End: 2018-11-19
Payer: SELF-PAY

## 2018-11-19 VITALS
HEART RATE: 83 BPM | BODY MASS INDEX: 44.82 KG/M2 | RESPIRATION RATE: 16 BRPM | DIASTOLIC BLOOD PRESSURE: 79 MMHG | SYSTOLIC BLOOD PRESSURE: 138 MMHG | HEIGHT: 65 IN | WEIGHT: 269 LBS | OXYGEN SATURATION: 98 % | TEMPERATURE: 98.4 F

## 2018-11-19 VITALS — BODY MASS INDEX: 38.61 KG/M2 | WEIGHT: 232 LBS

## 2018-11-19 DIAGNOSIS — Z00.00 ENCOUNTER FOR GENERAL ADULT MEDICAL EXAMINATION WITHOUT ABNORMAL FINDINGS: ICD-10-CM

## 2018-11-19 DIAGNOSIS — Z00.00 ENCOUNTER FOR GENERAL ADULT MEDICAL EXAMINATION W/OUT ABNORMAL FINDINGS: ICD-10-CM

## 2018-11-19 PROCEDURE — G0463: CPT

## 2018-11-19 NOTE — HISTORY OF PRESENT ILLNESS
[de-identified] : Pt declined use of  for this encounter. Implications of  refusal extensively discussed. Understanding assessed via teach back method. All other questions answered. Pt's mother present as well. Pt is states does not mind his mother being present for the encounter.\par \par 39yo M w/hx of obesity, CHRISTINE, and cholecystosis s/p emergent cholecystotomy tube insertion here to establish care. Pt states that in August, he had to have emergent insertion of cholecystotomy tube b/c he had a leakage in gall bladder and he was not medically stable for cholecystectomy per surgeon. He states adherent to tx and has no complaints this a.m. \par States has surgery scheduled for December. \par \par Denies malaise, changes in vision, SOB, CP, vomiting, diarrhea, constipation, abdominal pain, hematochezia/melena, numbness/tingling in extremities.

## 2018-11-19 NOTE — PHYSICAL EXAM
[No Acute Distress] : no acute distress [Well Nourished] : well nourished [Well Developed] : well developed [Well-Appearing] : well-appearing [Normal Sclera/Conjunctiva] : normal sclera/conjunctiva [PERRL] : pupils equal round and reactive to light [EOMI] : extraocular movements intact [Normal Outer Ear/Nose] : the outer ears and nose were normal in appearance [Normal Oropharynx] : the oropharynx was normal [Supple] : supple [No Lymphadenopathy] : no lymphadenopathy [Thyroid Normal, No Nodules] : the thyroid was normal and there were no nodules present [No Respiratory Distress] : no respiratory distress  [Clear to Auscultation] : lungs were clear to auscultation bilaterally [No Accessory Muscle Use] : no accessory muscle use [Normal Rate] : normal rate  [Regular Rhythm] : with a regular rhythm [Normal S1, S2] : normal S1 and S2 [Soft] : abdomen soft [Non Tender] : non-tender [Non-distended] : non-distended [Normal Bowel Sounds] : normal bowel sounds [Normal Posterior Cervical Nodes] : no posterior cervical lymphadenopathy [Normal Anterior Cervical Nodes] : no anterior cervical lymphadenopathy [No CVA Tenderness] : no CVA  tenderness [No Spinal Tenderness] : no spinal tenderness [Normal Gait] : normal gait [Coordination Grossly Intact] : coordination grossly intact [Normal Affect] : the affect was normal [Normal Insight/Judgement] : insight and judgment were intact [de-identified] : Obese male, well groomed [de-identified] : +jvd [de-identified] : Cholecystomy tube insertion site, dry, clean, intact, nonerythematous, no drainage/swelling. Tube is capped

## 2018-11-19 NOTE — COUNSELING
[Weight management counseling provided] : Weight management [Healthy eating counseling provided] : healthy eating [Activity counseling provided] : activity [Behavioral health counseling provided] : behavioral health  [Target Wt Loss Goal ___] : Target weight loss goal [unfilled] lbs [Weight Self Once Weekly] : Weight self once weekly [Keep Food Diary] : Keep food diary [Low Fat Diet] : Low fat diet [Decrease Portions] : Decrease food portions [Low Salt Diet] : Low salt diet [___ min/wk activity recommended] : [unfilled] min/wk activity recommended [Walking] : Walking [Sleep ___ hours/day] : Sleep [unfilled] hours/day [Engage in a relaxing activity] : Engage in a relaxing activity [Plan in advance] : Plan in advance [Financial issues] : Financial issues [Good understanding] : Patient has a good understanding of lifestyle changes and the steps needed to achieve self management goals [ - Behavioral Counseling for Obesity (Face-to-Face for 15 Minutes)] : Behavioral Counseling for Obesity (Face-to-Face for 15 Minutes)

## 2018-11-19 NOTE — PLAN
[FreeTextEntry1] : 38M as above here to establish care. \par \par # Health Maintenance\par - CBC, CMP, A1c, Lipid panel\par - Influenza vaccine and TdAP- states had it at work and will bring in paper work at the next visit\par - RTC w/acute issues\par - Will follow up laboratory work\par \par Discussed w/Dr. Mcintosh\par \par

## 2018-11-20 ENCOUNTER — OUTPATIENT (OUTPATIENT)
Dept: OUTPATIENT SERVICES | Facility: HOSPITAL | Age: 38
LOS: 1 days | End: 2018-11-20
Payer: SELF-PAY

## 2018-11-20 VITALS
WEIGHT: 237.22 LBS | HEART RATE: 82 BPM | SYSTOLIC BLOOD PRESSURE: 129 MMHG | TEMPERATURE: 98 F | OXYGEN SATURATION: 99 % | HEIGHT: 67 IN | DIASTOLIC BLOOD PRESSURE: 75 MMHG | RESPIRATION RATE: 14 BRPM

## 2018-11-20 VITALS — WEIGHT: 237.22 LBS | HEIGHT: 67 IN

## 2018-11-20 DIAGNOSIS — K80.10 CALCULUS OF GALLBLADDER WITH CHRONIC CHOLECYSTITIS WITHOUT OBSTRUCTION: ICD-10-CM

## 2018-11-20 DIAGNOSIS — Z01.818 ENCOUNTER FOR OTHER PREPROCEDURAL EXAMINATION: ICD-10-CM

## 2018-11-20 DIAGNOSIS — Z98.890 OTHER SPECIFIED POSTPROCEDURAL STATES: Chronic | ICD-10-CM

## 2018-11-20 DIAGNOSIS — K80.20 CALCULUS OF GALLBLADDER WITHOUT CHOLECYSTITIS WITHOUT OBSTRUCTION: ICD-10-CM

## 2018-11-20 LAB
ALBUMIN SERPL ELPH-MCNC: 4.1 G/DL — SIGNIFICANT CHANGE UP (ref 3.3–5)
ALP SERPL-CCNC: 147 U/L — HIGH (ref 40–120)
ALT FLD-CCNC: 45 U/L DA — SIGNIFICANT CHANGE UP (ref 10–45)
ANION GAP SERPL CALC-SCNC: 8 MMOL/L — SIGNIFICANT CHANGE UP (ref 5–17)
AST SERPL-CCNC: 22 U/L — SIGNIFICANT CHANGE UP (ref 10–40)
BILIRUB SERPL-MCNC: 0.3 MG/DL — SIGNIFICANT CHANGE UP (ref 0.2–1.2)
BUN SERPL-MCNC: 20 MG/DL — SIGNIFICANT CHANGE UP (ref 7–23)
CALCIUM SERPL-MCNC: 9.6 MG/DL — SIGNIFICANT CHANGE UP (ref 8.4–10.5)
CHLORIDE SERPL-SCNC: 101 MMOL/L — SIGNIFICANT CHANGE UP (ref 96–108)
CO2 SERPL-SCNC: 29 MMOL/L — SIGNIFICANT CHANGE UP (ref 22–31)
CREAT SERPL-MCNC: 0.7 MG/DL — SIGNIFICANT CHANGE UP (ref 0.5–1.3)
GLUCOSE SERPL-MCNC: 106 MG/DL — HIGH (ref 70–99)
HCT VFR BLD CALC: 46.2 % — SIGNIFICANT CHANGE UP (ref 39–50)
HGB BLD-MCNC: 15 G/DL — SIGNIFICANT CHANGE UP (ref 13–17)
MCHC RBC-ENTMCNC: 27.3 PG — SIGNIFICANT CHANGE UP (ref 27–34)
MCHC RBC-ENTMCNC: 32.5 GM/DL — SIGNIFICANT CHANGE UP (ref 32–36)
MCV RBC AUTO: 84 FL — SIGNIFICANT CHANGE UP (ref 80–100)
PLATELET # BLD AUTO: 361 K/UL — SIGNIFICANT CHANGE UP (ref 150–400)
POTASSIUM SERPL-MCNC: 4.1 MMOL/L — SIGNIFICANT CHANGE UP (ref 3.5–5.3)
POTASSIUM SERPL-SCNC: 4.1 MMOL/L — SIGNIFICANT CHANGE UP (ref 3.5–5.3)
PROT SERPL-MCNC: 10.1 G/DL — HIGH (ref 6–8.3)
RBC # BLD: 5.49 M/UL — SIGNIFICANT CHANGE UP (ref 4.2–5.8)
RBC # FLD: 13.1 % — SIGNIFICANT CHANGE UP (ref 10.3–14.5)
SODIUM SERPL-SCNC: 138 MMOL/L — SIGNIFICANT CHANGE UP (ref 135–145)
WBC # BLD: 9.1 K/UL — SIGNIFICANT CHANGE UP (ref 3.8–10.5)
WBC # FLD AUTO: 9.1 K/UL — SIGNIFICANT CHANGE UP (ref 3.8–10.5)

## 2018-11-20 PROCEDURE — 80053 COMPREHEN METABOLIC PANEL: CPT

## 2018-11-20 PROCEDURE — 36415 COLL VENOUS BLD VENIPUNCTURE: CPT

## 2018-11-20 PROCEDURE — 86900 BLOOD TYPING SEROLOGIC ABO: CPT

## 2018-11-20 PROCEDURE — 86901 BLOOD TYPING SEROLOGIC RH(D): CPT

## 2018-11-20 PROCEDURE — 86850 RBC ANTIBODY SCREEN: CPT

## 2018-11-20 PROCEDURE — 93010 ELECTROCARDIOGRAM REPORT: CPT | Mod: NC

## 2018-11-20 PROCEDURE — 85027 COMPLETE CBC AUTOMATED: CPT

## 2018-11-20 PROCEDURE — G0463: CPT

## 2018-11-20 PROCEDURE — 93005 ELECTROCARDIOGRAM TRACING: CPT

## 2018-11-20 NOTE — H&P PST ADULT - FAMILY HISTORY
Mother  Still living? Yes, Estimated age: 61-70  Family history of diabetes mellitus, Age at diagnosis: Age Unknown  Family history of cholelithiasis, Age at diagnosis: Age Unknown  Family history of thyroid disease, Age at diagnosis: Age Unknown     Father  Still living? Yes, Estimated age: 61-70  Family history of Parkinson disease, Age at diagnosis: Age Unknown  Family history of peripheral vascular disease, Age at diagnosis: Age Unknown     Sibling  Still living? Yes, Estimated age: 31-40  Family history of kidney stone, Age at diagnosis: Age Unknown

## 2018-11-20 NOTE — H&P PST ADULT - PROBLEM SELECTOR PLAN 1
laparoscopic cholecystectomy, possible open cholecystectomy. EKG from August 2018 was abnormal, will repeat today. medical clearance requested. pepcid and surgical wash instructions reviewed and verbalized understanding

## 2018-11-20 NOTE — H&P PST ADULT - PMH
Calculus of gallbladder with cholecystitis without biliary obstruction, unspecified cholecystitis acuity    CHRISTINE (obstructive sleep apnea)  no sleep studies done but had witnessed apnea by his brother  Scoliosis Calculus of gallbladder with cholecystitis without biliary obstruction, unspecified cholecystitis acuity    GERD (gastroesophageal reflux disease)  no medications  CHRISTINE (obstructive sleep apnea)  no sleep studies done but had witnessed apnea by his brother  Prediabetes    Scoliosis

## 2018-11-20 NOTE — H&P PST ADULT - HISTORY OF PRESENT ILLNESS
39 yo male presents with 3 years history of known cholecystitis and cholelithiasis. He reports intermittent episodes of right upper quadrant abdominal pain that he was able to tolerate. In August 2018 had a severe episode of abdominal pain and came to the ER at [facility] with fever and started on antibiotics. He went to the OR and had a biliary drain inserted but was told the gallbladder was too inflamed and could not be removed at that time. Patient reports that the drain has been "leaky" at the insertion site for the last 2 weeks after eating. He was instructed to call Dr. Pritchett to report the leakage.

## 2018-11-20 NOTE — H&P PST ADULT - RS GEN PE MLT RESP DETAILS PC
respirations non-labored/no rales/airway patent/no wheezes/no rhonchi/good air movement/breath sounds equal/clear to auscultation bilaterally

## 2018-11-21 PROBLEM — G47.33 OBSTRUCTIVE SLEEP APNEA (ADULT) (PEDIATRIC): Chronic | Status: ACTIVE | Noted: 2018-08-12

## 2018-11-26 PROBLEM — K21.9 GASTRO-ESOPHAGEAL REFLUX DISEASE WITHOUT ESOPHAGITIS: Chronic | Status: ACTIVE | Noted: 2018-11-20

## 2018-11-26 PROBLEM — R73.03 PREDIABETES: Chronic | Status: ACTIVE | Noted: 2018-11-20

## 2018-11-26 PROBLEM — M41.9 SCOLIOSIS, UNSPECIFIED: Chronic | Status: ACTIVE | Noted: 2018-11-20

## 2018-11-27 ENCOUNTER — APPOINTMENT (OUTPATIENT)
Dept: FAMILY MEDICINE | Facility: HOSPITAL | Age: 38
End: 2018-11-27

## 2018-11-27 ENCOUNTER — OUTPATIENT (OUTPATIENT)
Dept: OUTPATIENT SERVICES | Facility: HOSPITAL | Age: 38
LOS: 1 days | End: 2018-11-27
Payer: SELF-PAY

## 2018-11-27 ENCOUNTER — RESULT CHARGE (OUTPATIENT)
Age: 38
End: 2018-11-27

## 2018-11-27 VITALS
WEIGHT: 240 LBS | SYSTOLIC BLOOD PRESSURE: 122 MMHG | DIASTOLIC BLOOD PRESSURE: 80 MMHG | RESPIRATION RATE: 16 BRPM | TEMPERATURE: 98.6 F | HEART RATE: 88 BPM | HEIGHT: 65 IN | BODY MASS INDEX: 39.99 KG/M2 | OXYGEN SATURATION: 98 %

## 2018-11-27 DIAGNOSIS — Z98.890 OTHER SPECIFIED POSTPROCEDURAL STATES: Chronic | ICD-10-CM

## 2018-11-27 DIAGNOSIS — K80.20 CALCULUS OF GALLBLADDER W/OUT CHOLECYSTITIS W/OUT OBSTRUCTION: ICD-10-CM

## 2018-11-27 DIAGNOSIS — R31.9 HEMATURIA, UNSPECIFIED: ICD-10-CM

## 2018-11-27 DIAGNOSIS — Z00.00 ENCOUNTER FOR GENERAL ADULT MEDICAL EXAMINATION WITHOUT ABNORMAL FINDINGS: ICD-10-CM

## 2018-11-27 LAB — HBA1C MFR BLD HPLC: 6.1

## 2018-11-27 NOTE — COUNSELING
[Weight management counseling provided] : Weight management [Healthy eating counseling provided] : healthy eating [Activity counseling provided] : activity [Behavioral health counseling provided] : behavioral health  [Target Wt Loss Goal ___] : Target weight loss goal [unfilled] lbs [Weight Self Once Weekly] : Weight self once weekly [Keep Food Diary] : Keep food diary [Decrease Portions] : Decrease food portions [Low Salt Diet] : Low salt diet [___ min/wk activity recommended] : [unfilled] min/wk activity recommended [Walking] : Walking [Sleep ___ hours/day] : Sleep [unfilled] hours/day [Engage in a relaxing activity] : Engage in a relaxing activity [Plan in advance] : Plan in advance

## 2018-11-28 PROCEDURE — 80061 LIPID PANEL: CPT

## 2018-11-28 PROCEDURE — 85610 PROTHROMBIN TIME: CPT

## 2018-11-28 PROCEDURE — 93306 TTE W/DOPPLER COMPLETE: CPT | Mod: 26

## 2018-11-28 PROCEDURE — G0463: CPT

## 2018-11-28 PROCEDURE — 81001 URINALYSIS AUTO W/SCOPE: CPT

## 2018-11-28 PROCEDURE — 93306 TTE W/DOPPLER COMPLETE: CPT

## 2018-11-28 PROCEDURE — 85730 THROMBOPLASTIN TIME PARTIAL: CPT

## 2018-11-28 NOTE — REVIEW OF SYSTEMS
[Hematuria] : hematuria [Negative] : Gastrointestinal [Dysuria] : no dysuria [Incontinence] : no incontinence [Hesitancy] : no hesitancy [Nocturia] : no nocturia [Frequency] : no frequency

## 2018-11-28 NOTE — PHYSICAL EXAM
[No Acute Distress] : no acute distress [Well Nourished] : well nourished [Well Developed] : well developed [Supple] : supple [No Respiratory Distress] : no respiratory distress  [Clear to Auscultation] : lungs were clear to auscultation bilaterally [No Accessory Muscle Use] : no accessory muscle use [Normal Rate] : normal rate  [Regular Rhythm] : with a regular rhythm [Normal S1, S2] : normal S1 and S2 [Soft] : abdomen soft [Non Tender] : non-tender [Non-distended] : non-distended [Normal Bowel Sounds] : normal bowel sounds [de-identified] : cholecystotomy tube site w/serous drainage, no inflammation, no erythema

## 2018-11-28 NOTE — HISTORY OF PRESENT ILLNESS
[de-identified] : Pt declined use of  for this encounter. Implications of  refusal extensively discussed. Understanding assessed via teach back method. All other questions answered. Pt's mother present as well. Pt is states does not mind his mother being present for the encounter.\par \par 39yo M w/hx of obesity, CHRISTINE, and cholelithiasis s/p emergent cholecystotomy tube insertion here for f/u.\par Pt has tube removal and ?cholecystectomy on December 3, 2018. \par Pt states hematuria w/o dysuria, increased urinary frequency. \par Pt also states +drainage from cholecystotomy tube insertion site w/o pus or erythema.\par \par Denies, fevers/chills, malaise,  headache, chest pain, SOB, N/V/D/C, abdominal pain, numbness/tingling in extremities. 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

## 2018-11-28 NOTE — PLAN
[FreeTextEntry1] : 38M as above.\par \par # Presurgical clearance\par - Given LVH and ?previous hx of inferior infarct, pt needs further evaluation w/TTE\par - POCT A1c\par - Lipid panel\par - RTC on Friday 11/30\par \par #Hematuria\par - POCT UA +trace blood--> UA microscopy\par \par #Health Maintenance\par - Influenza vaccine and TdAP- states had it at work and will bring in paper work at the next visit\par - RTC w/acute issues\par - Will follow up laboratory work\par \par Discussed w/Dr. Mcintosh and Elva Ann\par

## 2018-11-29 ENCOUNTER — APPOINTMENT (OUTPATIENT)
Dept: FAMILY MEDICINE | Facility: CLINIC | Age: 38
End: 2018-11-29

## 2018-11-29 ENCOUNTER — OUTPATIENT (OUTPATIENT)
Dept: OUTPATIENT SERVICES | Facility: HOSPITAL | Age: 38
LOS: 1 days | End: 2018-11-29
Payer: SELF-PAY

## 2018-11-29 ENCOUNTER — APPOINTMENT (OUTPATIENT)
Dept: FAMILY MEDICINE | Facility: HOSPITAL | Age: 38
End: 2018-11-29

## 2018-11-29 VITALS
SYSTOLIC BLOOD PRESSURE: 154 MMHG | BODY MASS INDEX: 39.44 KG/M2 | OXYGEN SATURATION: 97 % | RESPIRATION RATE: 16 BRPM | TEMPERATURE: 98.3 F | WEIGHT: 237 LBS | DIASTOLIC BLOOD PRESSURE: 81 MMHG | HEART RATE: 86 BPM

## 2018-11-29 VITALS — SYSTOLIC BLOOD PRESSURE: 126 MMHG | DIASTOLIC BLOOD PRESSURE: 81 MMHG

## 2018-11-29 DIAGNOSIS — Z00.00 ENCOUNTER FOR GENERAL ADULT MEDICAL EXAMINATION WITHOUT ABNORMAL FINDINGS: ICD-10-CM

## 2018-11-29 DIAGNOSIS — Z98.890 OTHER SPECIFIED POSTPROCEDURAL STATES: Chronic | ICD-10-CM

## 2018-11-29 LAB
APPEARANCE: CLEAR
BACTERIA: NEGATIVE
BILIRUB UR QL STRIP: NORMAL
BILIRUBIN URINE: NEGATIVE
BLOOD URINE: NEGATIVE
CHOLEST SERPL-MCNC: 151 MG/DL
CHOLEST/HDLC SERPL: 3.3 RATIO
CLARITY UR: CLEAR
COLLECTION METHOD: NORMAL
COLOR: YELLOW
GLUCOSE QUALITATIVE U: NEGATIVE MG/DL
GLUCOSE UR-MCNC: NORMAL
HCG UR QL: 0.2 EU/DL
HDLC SERPL-MCNC: 46 MG/DL
HGB UR QL STRIP.AUTO: NORMAL
KETONES UR-MCNC: NORMAL
KETONES URINE: NEGATIVE
LDLC SERPL CALC-MCNC: 91 MG/DL
LEUKOCYTE ESTERASE UR QL STRIP: NORMAL
LEUKOCYTE ESTERASE URINE: NEGATIVE
MICROSCOPIC-UA: NORMAL
NITRITE UR QL STRIP: NORMAL
NITRITE URINE: NEGATIVE
PH UR STRIP: 6
PH URINE: 6
PROT UR STRIP-MCNC: NORMAL
PROTEIN URINE: NEGATIVE MG/DL
RED BLOOD CELLS URINE: 0 /HPF
SP GR UR STRIP: 1.02
SPECIFIC GRAVITY URINE: 1.02
SQUAMOUS EPITHELIAL CELLS: 0 /HPF
TRIGL SERPL-MCNC: 70 MG/DL
UROBILINOGEN URINE: NEGATIVE MG/DL
WHITE BLOOD CELLS URINE: 1 /HPF

## 2018-11-29 PROCEDURE — G0463: CPT

## 2018-11-29 NOTE — HISTORY OF PRESENT ILLNESS
[Sleep Apnea] : sleep apnea [(Patient denies any chest pain, claudication, dyspnea on exertion, orthopnea, palpitations or syncope)] : Patient denies any chest pain, claudication, dyspnea on exertion, orthopnea, palpitations or syncope [Moderate (4-6 METs)] : Moderate (4-6 METs) [Asthma] : no asthma [COPD] : no COPD [Smoker] : not a smoker [Chronic Anticoagulation] : no chronic anticoagulation [Chronic Kidney Disease] : no chronic kidney disease [Diabetes] : no diabetes [FreeTextEntry1] : cholecystectomy [FreeTextEntry2] : 12/3/2018 [FreeTextEntry3] : Dr. Pritchett [FreeTextEntry4] : 37yo M w/hx of obesity, CHRISTINE, and cholelithiasis s/p emergent cholecystotomy tube insertion here for f/u.\par Pt has tube removal and ?cholecystectomy on December 3, 2018. \par \par Denies, fevers/chills, malaise, headache, chest pain, SOB, N/V/D/C, abdominal pain, numbness/tingling in extremities. 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 \par ECHO w/grade II diastolic dysfunction

## 2018-11-29 NOTE — RESULTS/DATA
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 \par Pt's previous EKG w/concern for previous inferior infarct and LVH. [de-identified] : ECHO: EF 55-60% w/grade II diastolic dysfunction

## 2018-11-29 NOTE — PLAN
[FreeTextEntry1] : 38 M as above.\par \par # Presurgical clearance\par - Given LVH and ?previous hx of inferior infarct. Pt's ECHO w/EF 55-60% and grade II diastolic dysfunction\par - POCT A1c 6.1\par - Lipid panel results noted\par - Pt w/hx of CHRISTINE -- may  need sleep study \par - Pt is high risk for high risk surgery -- will need further assessment by cardiology\par \par Discussed w/Dr. Stevenson

## 2018-11-29 NOTE — ASSESSMENT
[High Risk Surgery - Intraperitoneal, Intrathoracic or Supringuinal Vascular Procedures] : High Risk Surgery - Intraperitoneal, Intrathoracic or Supringuinal Vascular Procedures - Yes (1) [Ischemic Heart Disease] : Ischemic Heart Disease  - Yes (1) [Congestive Heart Failure] : Congestive Heart Failure - Yes (1) [Prior Cerebrovascular Accident or TIA] : Prior Cerebrovascular Accident or TIA - No (0) [Creatinine >= 2mg/dL (1 Point)] : Creatinine >= 2mg/dL - No (0) [Insulin-dependent Diabetic (1 Point)] : Insulin-dependent Diabetic - No (0) [3 - 6] : 3 - 6 , RCRI Class: IV, Risk of Post-Op Cardiac Complications: 11%, Procedure Risk: Elevated-Risk [Patient Optimized for Surgery] : Patient optimized for surgery [No Further Testing Recommended] : no further testing recommended

## 2018-11-30 ENCOUNTER — APPOINTMENT (OUTPATIENT)
Dept: FAMILY MEDICINE | Facility: HOSPITAL | Age: 38
End: 2018-11-30

## 2018-11-30 ENCOUNTER — OUTPATIENT (OUTPATIENT)
Dept: OUTPATIENT SERVICES | Facility: HOSPITAL | Age: 38
LOS: 1 days | End: 2018-11-30
Payer: SELF-PAY

## 2018-11-30 VITALS
DIASTOLIC BLOOD PRESSURE: 76 MMHG | TEMPERATURE: 98.1 F | SYSTOLIC BLOOD PRESSURE: 123 MMHG | BODY MASS INDEX: 39.44 KG/M2 | HEART RATE: 74 BPM | OXYGEN SATURATION: 97 % | WEIGHT: 237 LBS | RESPIRATION RATE: 16 BRPM

## 2018-11-30 DIAGNOSIS — Z01.818 ENCOUNTER FOR OTHER PREPROCEDURAL EXAMINATION: ICD-10-CM

## 2018-11-30 DIAGNOSIS — K80.20 CALCULUS OF GALLBLADDER WITHOUT CHOLECYSTITIS WITHOUT OBSTRUCTION: ICD-10-CM

## 2018-11-30 DIAGNOSIS — Z98.890 OTHER SPECIFIED POSTPROCEDURAL STATES: Chronic | ICD-10-CM

## 2018-11-30 DIAGNOSIS — Z00.00 ENCOUNTER FOR GENERAL ADULT MEDICAL EXAMINATION WITHOUT ABNORMAL FINDINGS: ICD-10-CM

## 2018-11-30 LAB
APTT BLD: 34.6 SEC
INR PPP: 1.13 RATIO
PT BLD: 12.9 SEC

## 2018-11-30 PROCEDURE — G0463: CPT

## 2018-11-30 NOTE — REVIEW OF SYSTEMS
[Shortness Of Breath] : shortness of breath [Fever] : no fever [Chills] : no chills [Fatigue] : no fatigue [Night Sweats] : no night sweats [Recent Change In Weight] : ~T no recent weight change [Discharge] : no discharge [Pain] : no pain [Vision Problems] : no vision problems [Earache] : no earache [Hearing Loss] : no hearing loss [Nasal Discharge] : no nasal discharge [Sore Throat] : no sore throat [Chest Pain] : no chest pain [Palpitations] : no palpitations [Orthopena] : no orthopnea [Wheezing] : no wheezing [Cough] : no cough [Abdominal Pain] : no abdominal pain [Nausea] : no nausea [Vomiting] : no vomiting [Dysuria] : no dysuria [Hematuria] : no hematuria [Joint Pain] : no joint pain [Joint Stiffness] : no joint stiffness [Back Pain] : no back pain [Headache] : no headache [FreeTextEntry6] : +sob at night time  Purse String (Simple) Text: Given the location of the defect and the characteristics of the surrounding skin a purse string simple closure was deemed most appropriate.  Undermining was performed circumferentially around the surgical defect.  A purse string suture was then placed and tightened.

## 2018-11-30 NOTE — ASSESSMENT
[FreeTextEntry1] : 38 year old male as above \par \par #pre-op medical optimazation w/ hx of yun\par - patient seen yesterday in clinic and sent to cardiology for cardiac clearance\par - pt seen and optimized by Dr Friedman Cardiology\par - Dr Friedman consult placed in scan pile and also attached to note for patient to give to surgery\par - rtc 1 week post surgery\par - patient is medically optimized per cardiology for gallbladder surgery \par - patient is medically optimized for gallbladder surgery which is a moderate risk procedure\par \par d/w Dr Shanks

## 2018-11-30 NOTE — HISTORY OF PRESENT ILLNESS
[FreeTextEntry1] : CC: Medical Optimization prior to gallbladder surgery [de-identified] : Patient is a 38 year old male w/ hx of CHRISTINE coming to clinic for optimization prior to gallbladder surgery. patient states overall is feeling well. states sob at night time however no chest pain. states followed with cardiology yesterday. states gallbladder surgery on monday in AM.

## 2018-11-30 NOTE — PHYSICAL EXAM
[Well Nourished] : well nourished [Well Developed] : well developed [Well-Appearing] : well-appearing [PERRL] : pupils equal round and reactive to light [EOMI] : extraocular movements intact [No JVD] : no jugular venous distention [Supple] : supple [No Lymphadenopathy] : no lymphadenopathy [No Respiratory Distress] : no respiratory distress  [Clear to Auscultation] : lungs were clear to auscultation bilaterally [No Accessory Muscle Use] : no accessory muscle use [Normal S1, S2] : normal S1 and S2 [No Edema] : there was no peripheral edema [Soft] : abdomen soft [Non-distended] : non-distended [Normal Bowel Sounds] : normal bowel sounds [Normal Posterior Cervical Nodes] : no posterior cervical lymphadenopathy [Normal Anterior Cervical Nodes] : no anterior cervical lymphadenopathy [No CVA Tenderness] : no CVA  tenderness [No Spinal Tenderness] : no spinal tenderness [No Joint Swelling] : no joint swelling [Grossly Normal Strength/Tone] : grossly normal strength/tone [Normal Gait] : normal gait [No Focal Deficits] : no focal deficits [Normal Affect] : the affect was normal [Normal Insight/Judgement] : insight and judgment were intact

## 2018-12-01 DIAGNOSIS — Z01.818 ENCOUNTER FOR OTHER PREPROCEDURAL EXAMINATION: ICD-10-CM

## 2018-12-01 DIAGNOSIS — K80.20 CALCULUS OF GALLBLADDER WITHOUT CHOLECYSTITIS WITHOUT OBSTRUCTION: ICD-10-CM

## 2018-12-02 ENCOUNTER — TRANSCRIPTION ENCOUNTER (OUTPATIENT)
Age: 38
End: 2018-12-02

## 2018-12-03 ENCOUNTER — APPOINTMENT (OUTPATIENT)
Dept: SURGERY | Facility: HOSPITAL | Age: 38
End: 2018-12-03

## 2018-12-03 ENCOUNTER — RESULT REVIEW (OUTPATIENT)
Age: 38
End: 2018-12-03

## 2018-12-03 ENCOUNTER — OUTPATIENT (OUTPATIENT)
Dept: OUTPATIENT SERVICES | Facility: HOSPITAL | Age: 38
LOS: 1 days | End: 2018-12-03
Payer: SELF-PAY

## 2018-12-03 VITALS
SYSTOLIC BLOOD PRESSURE: 146 MMHG | DIASTOLIC BLOOD PRESSURE: 80 MMHG | RESPIRATION RATE: 16 BRPM | TEMPERATURE: 97 F | HEART RATE: 84 BPM | OXYGEN SATURATION: 98 %

## 2018-12-03 VITALS
WEIGHT: 237.22 LBS | RESPIRATION RATE: 17 BRPM | SYSTOLIC BLOOD PRESSURE: 145 MMHG | DIASTOLIC BLOOD PRESSURE: 82 MMHG | HEIGHT: 67 IN | HEART RATE: 86 BPM | TEMPERATURE: 98 F | OXYGEN SATURATION: 100 %

## 2018-12-03 DIAGNOSIS — K80.20 CALCULUS OF GALLBLADDER WITHOUT CHOLECYSTITIS WITHOUT OBSTRUCTION: ICD-10-CM

## 2018-12-03 DIAGNOSIS — Z01.818 ENCOUNTER FOR OTHER PREPROCEDURAL EXAMINATION: ICD-10-CM

## 2018-12-03 DIAGNOSIS — Z98.890 OTHER SPECIFIED POSTPROCEDURAL STATES: Chronic | ICD-10-CM

## 2018-12-03 LAB — ABO RH CONFIRMATION: SIGNIFICANT CHANGE UP

## 2018-12-03 PROCEDURE — 47562 LAPAROSCOPIC CHOLECYSTECTOMY: CPT | Mod: 22

## 2018-12-03 PROCEDURE — 88304 TISSUE EXAM BY PATHOLOGIST: CPT

## 2018-12-03 PROCEDURE — 47562 LAPAROSCOPIC CHOLECYSTECTOMY: CPT

## 2018-12-03 PROCEDURE — 47562 LAPAROSCOPIC CHOLECYSTECTOMY: CPT | Mod: AS,22

## 2018-12-03 PROCEDURE — 88304 TISSUE EXAM BY PATHOLOGIST: CPT | Mod: 26

## 2018-12-03 RX ORDER — HYDROMORPHONE HYDROCHLORIDE 2 MG/ML
0.5 INJECTION INTRAMUSCULAR; INTRAVENOUS; SUBCUTANEOUS ONCE
Qty: 0 | Refills: 0 | Status: DISCONTINUED | OUTPATIENT
Start: 2018-12-03 | End: 2018-12-03

## 2018-12-03 RX ORDER — ACETAMINOPHEN 500 MG
650 TABLET ORAL EVERY 6 HOURS
Qty: 0 | Refills: 0 | Status: DISCONTINUED | OUTPATIENT
Start: 2018-12-03 | End: 2018-12-18

## 2018-12-03 RX ORDER — KETOROLAC TROMETHAMINE 30 MG/ML
30 SYRINGE (ML) INJECTION ONCE
Qty: 0 | Refills: 0 | Status: DISCONTINUED | OUTPATIENT
Start: 2018-12-03 | End: 2018-12-03

## 2018-12-03 RX ORDER — SODIUM CHLORIDE 9 MG/ML
1000 INJECTION, SOLUTION INTRAVENOUS
Qty: 0 | Refills: 0 | Status: DISCONTINUED | OUTPATIENT
Start: 2018-12-03 | End: 2018-12-04

## 2018-12-03 RX ORDER — OXYCODONE AND ACETAMINOPHEN 5; 325 MG/1; MG/1
2 TABLET ORAL EVERY 6 HOURS
Qty: 0 | Refills: 0 | Status: DISCONTINUED | OUTPATIENT
Start: 2018-12-03 | End: 2018-12-03

## 2018-12-03 RX ORDER — ONDANSETRON 8 MG/1
4 TABLET, FILM COATED ORAL ONCE
Qty: 0 | Refills: 0 | Status: DISCONTINUED | OUTPATIENT
Start: 2018-12-03 | End: 2018-12-18

## 2018-12-03 RX ORDER — SODIUM CHLORIDE 9 MG/ML
1000 INJECTION, SOLUTION INTRAVENOUS
Qty: 0 | Refills: 0 | Status: DISCONTINUED | OUTPATIENT
Start: 2018-12-03 | End: 2018-12-03

## 2018-12-03 RX ORDER — OXYCODONE HYDROCHLORIDE 5 MG/1
10 TABLET ORAL ONCE
Qty: 0 | Refills: 0 | Status: DISCONTINUED | OUTPATIENT
Start: 2018-12-03 | End: 2018-12-04

## 2018-12-03 RX ORDER — ONDANSETRON 8 MG/1
4 TABLET, FILM COATED ORAL ONCE
Qty: 0 | Refills: 0 | Status: COMPLETED | OUTPATIENT
Start: 2018-12-03 | End: 2018-12-03

## 2018-12-03 RX ORDER — OXYCODONE AND ACETAMINOPHEN 5; 325 MG/1; MG/1
1 TABLET ORAL EVERY 4 HOURS
Qty: 0 | Refills: 0 | Status: DISCONTINUED | OUTPATIENT
Start: 2018-12-03 | End: 2018-12-03

## 2018-12-03 RX ORDER — OXYCODONE HYDROCHLORIDE 5 MG/1
5 TABLET ORAL ONCE
Qty: 0 | Refills: 0 | Status: DISCONTINUED | OUTPATIENT
Start: 2018-12-03 | End: 2018-12-04

## 2018-12-03 RX ADMIN — SODIUM CHLORIDE 50 MILLILITER(S): 9 INJECTION, SOLUTION INTRAVENOUS at 09:46

## 2018-12-03 RX ADMIN — ONDANSETRON 4 MILLIGRAM(S): 8 TABLET, FILM COATED ORAL at 16:01

## 2018-12-03 RX ADMIN — OXYCODONE AND ACETAMINOPHEN 1 TABLET(S): 5; 325 TABLET ORAL at 17:26

## 2018-12-03 RX ADMIN — OXYCODONE AND ACETAMINOPHEN 1 TABLET(S): 5; 325 TABLET ORAL at 16:56

## 2018-12-03 RX ADMIN — HYDROMORPHONE HYDROCHLORIDE 0.5 MILLIGRAM(S): 2 INJECTION INTRAMUSCULAR; INTRAVENOUS; SUBCUTANEOUS at 14:30

## 2018-12-03 RX ADMIN — HYDROMORPHONE HYDROCHLORIDE 0.5 MILLIGRAM(S): 2 INJECTION INTRAMUSCULAR; INTRAVENOUS; SUBCUTANEOUS at 14:40

## 2018-12-03 RX ADMIN — Medication 30 MILLIGRAM(S): at 15:45

## 2018-12-03 RX ADMIN — Medication 30 MILLIGRAM(S): at 15:12

## 2018-12-03 NOTE — ASU PATIENT PROFILE, ADULT - VISION (WITH CORRECTIVE LENSES IF THE PATIENT USUALLY WEARS THEM):
Normal vision: sees adequately in most situations; can see medication labels, newsprint distance glasses only/Normal vision: sees adequately in most situations; can see medication labels, newsprint

## 2018-12-03 NOTE — BRIEF OPERATIVE NOTE - PROCEDURE
<<-----Click on this checkbox to enter Procedure Laparoscopic cholecystectomy  12/03/2018    Active  DONELL

## 2018-12-03 NOTE — ASU PATIENT PROFILE, ADULT - PMH
Calculus of gallbladder with cholecystitis without biliary obstruction, unspecified cholecystitis acuity    GERD (gastroesophageal reflux disease)  no medications  CHRISTINE (obstructive sleep apnea)  no sleep studies done but had witnessed apnea by his brother  Prediabetes    Scoliosis

## 2018-12-03 NOTE — ASU DISCHARGE PLAN (ADULT/PEDIATRIC). - NURSING INSTRUCTIONS
All of discharge, safety, pain management, follow up with surgeon. Verbalized understanding of all instructions.

## 2018-12-03 NOTE — ASU DISCHARGE PLAN (ADULT/PEDIATRIC). - SPECIAL INSTRUCTIONS
may shower in 24 hours- leave steri -strips in place  change dressing where tube was every day  no heavy lifting, pulling, or pushing

## 2018-12-04 ENCOUNTER — INPATIENT (INPATIENT)
Facility: HOSPITAL | Age: 38
LOS: 0 days | Discharge: HOME | DRG: 393 | End: 2018-12-05
Attending: FAMILY MEDICINE | Admitting: INTERNAL MEDICINE
Payer: MEDICAID

## 2018-12-04 VITALS
HEART RATE: 81 BPM | HEIGHT: 65 IN | TEMPERATURE: 99 F | DIASTOLIC BLOOD PRESSURE: 88 MMHG | SYSTOLIC BLOOD PRESSURE: 160 MMHG | RESPIRATION RATE: 16 BRPM | WEIGHT: 240.08 LBS | OXYGEN SATURATION: 100 %

## 2018-12-04 DIAGNOSIS — R10.84 GENERALIZED ABDOMINAL PAIN: ICD-10-CM

## 2018-12-04 DIAGNOSIS — Z98.890 OTHER SPECIFIED POSTPROCEDURAL STATES: Chronic | ICD-10-CM

## 2018-12-04 DIAGNOSIS — Z29.9 ENCOUNTER FOR PROPHYLACTIC MEASURES, UNSPECIFIED: ICD-10-CM

## 2018-12-04 DIAGNOSIS — Z90.49 ACQUIRED ABSENCE OF OTHER SPECIFIED PARTS OF DIGESTIVE TRACT: Chronic | ICD-10-CM

## 2018-12-04 DIAGNOSIS — R73.03 PREDIABETES: ICD-10-CM

## 2018-12-04 DIAGNOSIS — R10.9 UNSPECIFIED ABDOMINAL PAIN: ICD-10-CM

## 2018-12-04 LAB
ALBUMIN SERPL ELPH-MCNC: 3.3 G/DL — SIGNIFICANT CHANGE UP (ref 3.3–5)
ALP SERPL-CCNC: 106 U/L — SIGNIFICANT CHANGE UP (ref 40–120)
ALT FLD-CCNC: 47 U/L DA — HIGH (ref 10–45)
ANION GAP SERPL CALC-SCNC: 10 MMOL/L — SIGNIFICANT CHANGE UP (ref 5–17)
ANION GAP SERPL CALC-SCNC: 9 MMOL/L — SIGNIFICANT CHANGE UP (ref 5–17)
AST SERPL-CCNC: 21 U/L — SIGNIFICANT CHANGE UP (ref 10–40)
BASOPHILS # BLD AUTO: 0.1 K/UL — SIGNIFICANT CHANGE UP (ref 0–0.2)
BASOPHILS # BLD AUTO: 0.1 K/UL — SIGNIFICANT CHANGE UP (ref 0–0.2)
BASOPHILS NFR BLD AUTO: 0.4 % — SIGNIFICANT CHANGE UP (ref 0–2)
BASOPHILS NFR BLD AUTO: 0.5 % — SIGNIFICANT CHANGE UP (ref 0–2)
BILIRUB SERPL-MCNC: 0.2 MG/DL — SIGNIFICANT CHANGE UP (ref 0.2–1.2)
BUN SERPL-MCNC: 10 MG/DL — SIGNIFICANT CHANGE UP (ref 7–23)
BUN SERPL-MCNC: 12 MG/DL — SIGNIFICANT CHANGE UP (ref 7–23)
CALCIUM SERPL-MCNC: 8.2 MG/DL — LOW (ref 8.4–10.5)
CALCIUM SERPL-MCNC: 8.4 MG/DL — SIGNIFICANT CHANGE UP (ref 8.4–10.5)
CHLORIDE SERPL-SCNC: 102 MMOL/L — SIGNIFICANT CHANGE UP (ref 96–108)
CHLORIDE SERPL-SCNC: 102 MMOL/L — SIGNIFICANT CHANGE UP (ref 96–108)
CO2 SERPL-SCNC: 24 MMOL/L — SIGNIFICANT CHANGE UP (ref 22–31)
CO2 SERPL-SCNC: 26 MMOL/L — SIGNIFICANT CHANGE UP (ref 22–31)
CREAT SERPL-MCNC: 0.68 MG/DL — SIGNIFICANT CHANGE UP (ref 0.5–1.3)
CREAT SERPL-MCNC: 0.77 MG/DL — SIGNIFICANT CHANGE UP (ref 0.5–1.3)
EOSINOPHIL # BLD AUTO: 0 K/UL — SIGNIFICANT CHANGE UP (ref 0–0.5)
EOSINOPHIL # BLD AUTO: 0 K/UL — SIGNIFICANT CHANGE UP (ref 0–0.5)
EOSINOPHIL NFR BLD AUTO: 0 % — SIGNIFICANT CHANGE UP (ref 0–6)
EOSINOPHIL NFR BLD AUTO: 0 % — SIGNIFICANT CHANGE UP (ref 0–6)
GLUCOSE SERPL-MCNC: 171 MG/DL — HIGH (ref 70–99)
GLUCOSE SERPL-MCNC: 199 MG/DL — HIGH (ref 70–99)
HCT VFR BLD CALC: 38.6 % — LOW (ref 39–50)
HCT VFR BLD CALC: 39 % — SIGNIFICANT CHANGE UP (ref 39–50)
HGB BLD-MCNC: 12.5 G/DL — LOW (ref 13–17)
HGB BLD-MCNC: 13.1 G/DL — SIGNIFICANT CHANGE UP (ref 13–17)
LIDOCAIN IGE QN: 100 U/L — SIGNIFICANT CHANGE UP (ref 73–393)
LYMPHOCYTES # BLD AUTO: 0.7 K/UL — LOW (ref 1–3.3)
LYMPHOCYTES # BLD AUTO: 1.6 K/UL — SIGNIFICANT CHANGE UP (ref 1–3.3)
LYMPHOCYTES # BLD AUTO: 10.8 % — LOW (ref 13–44)
LYMPHOCYTES # BLD AUTO: 5.4 % — LOW (ref 13–44)
MCHC RBC-ENTMCNC: 27 PG — SIGNIFICANT CHANGE UP (ref 27–34)
MCHC RBC-ENTMCNC: 27.5 PG — SIGNIFICANT CHANGE UP (ref 27–34)
MCHC RBC-ENTMCNC: 32.5 GM/DL — SIGNIFICANT CHANGE UP (ref 32–36)
MCHC RBC-ENTMCNC: 33.6 GM/DL — SIGNIFICANT CHANGE UP (ref 32–36)
MCV RBC AUTO: 82 FL — SIGNIFICANT CHANGE UP (ref 80–100)
MCV RBC AUTO: 83.1 FL — SIGNIFICANT CHANGE UP (ref 80–100)
MONOCYTES # BLD AUTO: 0.9 K/UL — SIGNIFICANT CHANGE UP (ref 0–0.9)
MONOCYTES # BLD AUTO: 1.4 K/UL — HIGH (ref 0–0.9)
MONOCYTES NFR BLD AUTO: 6.6 % — SIGNIFICANT CHANGE UP (ref 1–9)
MONOCYTES NFR BLD AUTO: 9.5 % — HIGH (ref 1–9)
NEUTROPHILS # BLD AUTO: 12 K/UL — HIGH (ref 1.8–7.4)
NEUTROPHILS # BLD AUTO: 12 K/UL — HIGH (ref 1.8–7.4)
NEUTROPHILS NFR BLD AUTO: 79.2 % — HIGH (ref 43–77)
NEUTROPHILS NFR BLD AUTO: 87.6 % — HIGH (ref 43–77)
PLATELET # BLD AUTO: 298 K/UL — SIGNIFICANT CHANGE UP (ref 150–400)
PLATELET # BLD AUTO: 348 K/UL — SIGNIFICANT CHANGE UP (ref 150–400)
POTASSIUM SERPL-MCNC: 3.9 MMOL/L — SIGNIFICANT CHANGE UP (ref 3.5–5.3)
POTASSIUM SERPL-MCNC: 4.1 MMOL/L — SIGNIFICANT CHANGE UP (ref 3.5–5.3)
POTASSIUM SERPL-SCNC: 3.9 MMOL/L — SIGNIFICANT CHANGE UP (ref 3.5–5.3)
POTASSIUM SERPL-SCNC: 4.1 MMOL/L — SIGNIFICANT CHANGE UP (ref 3.5–5.3)
PROT SERPL-MCNC: 8.3 G/DL — SIGNIFICANT CHANGE UP (ref 6–8.3)
RBC # BLD: 4.64 M/UL — SIGNIFICANT CHANGE UP (ref 4.2–5.8)
RBC # BLD: 4.75 M/UL — SIGNIFICANT CHANGE UP (ref 4.2–5.8)
RBC # FLD: 12.9 % — SIGNIFICANT CHANGE UP (ref 10.3–14.5)
RBC # FLD: 12.9 % — SIGNIFICANT CHANGE UP (ref 10.3–14.5)
SODIUM SERPL-SCNC: 136 MMOL/L — SIGNIFICANT CHANGE UP (ref 135–145)
SODIUM SERPL-SCNC: 137 MMOL/L — SIGNIFICANT CHANGE UP (ref 135–145)
WBC # BLD: 13.7 K/UL — HIGH (ref 3.8–10.5)
WBC # BLD: 15.2 K/UL — HIGH (ref 3.8–10.5)
WBC # FLD AUTO: 13.7 K/UL — HIGH (ref 3.8–10.5)
WBC # FLD AUTO: 15.2 K/UL — HIGH (ref 3.8–10.5)

## 2018-12-04 PROCEDURE — 99053 MED SERV 10PM-8AM 24 HR FAC: CPT

## 2018-12-04 PROCEDURE — 99285 EMERGENCY DEPT VISIT HI MDM: CPT | Mod: 25

## 2018-12-04 PROCEDURE — 99223 1ST HOSP IP/OBS HIGH 75: CPT

## 2018-12-04 PROCEDURE — 74018 RADEX ABDOMEN 1 VIEW: CPT | Mod: 26

## 2018-12-04 RX ORDER — HYDROMORPHONE HYDROCHLORIDE 2 MG/ML
1 INJECTION INTRAMUSCULAR; INTRAVENOUS; SUBCUTANEOUS ONCE
Qty: 0 | Refills: 0 | Status: DISCONTINUED | OUTPATIENT
Start: 2018-12-04 | End: 2018-12-04

## 2018-12-04 RX ORDER — POLYETHYLENE GLYCOL 3350 17 G/17G
17 POWDER, FOR SOLUTION ORAL DAILY
Qty: 0 | Refills: 0 | Status: DISCONTINUED | OUTPATIENT
Start: 2018-12-04 | End: 2018-12-05

## 2018-12-04 RX ORDER — MORPHINE SULFATE 50 MG/1
4 CAPSULE, EXTENDED RELEASE ORAL
Qty: 0 | Refills: 0 | Status: DISCONTINUED | OUTPATIENT
Start: 2018-12-04 | End: 2018-12-05

## 2018-12-04 RX ORDER — ONDANSETRON 8 MG/1
4 TABLET, FILM COATED ORAL EVERY 6 HOURS
Qty: 0 | Refills: 0 | Status: DISCONTINUED | OUTPATIENT
Start: 2018-12-04 | End: 2018-12-05

## 2018-12-04 RX ORDER — SODIUM CHLORIDE 9 MG/ML
1000 INJECTION INTRAMUSCULAR; INTRAVENOUS; SUBCUTANEOUS ONCE
Qty: 0 | Refills: 0 | Status: COMPLETED | OUTPATIENT
Start: 2018-12-04 | End: 2018-12-04

## 2018-12-04 RX ORDER — ACETAMINOPHEN 500 MG
650 TABLET ORAL EVERY 6 HOURS
Qty: 0 | Refills: 0 | Status: DISCONTINUED | OUTPATIENT
Start: 2018-12-04 | End: 2018-12-05

## 2018-12-04 RX ORDER — MAGNESIUM HYDROXIDE 400 MG/1
30 TABLET, CHEWABLE ORAL ONCE
Qty: 0 | Refills: 0 | Status: DISCONTINUED | OUTPATIENT
Start: 2018-12-04 | End: 2018-12-05

## 2018-12-04 RX ORDER — SIMETHICONE 80 MG/1
80 TABLET, CHEWABLE ORAL ONCE
Qty: 0 | Refills: 0 | Status: COMPLETED | OUTPATIENT
Start: 2018-12-04 | End: 2018-12-04

## 2018-12-04 RX ORDER — PANTOPRAZOLE SODIUM 20 MG/1
40 TABLET, DELAYED RELEASE ORAL ONCE
Qty: 0 | Refills: 0 | Status: COMPLETED | OUTPATIENT
Start: 2018-12-04 | End: 2018-12-04

## 2018-12-04 RX ORDER — FENTANYL CITRATE 50 UG/ML
50 INJECTION INTRAVENOUS ONCE
Qty: 0 | Refills: 0 | Status: DISCONTINUED | OUTPATIENT
Start: 2018-12-04 | End: 2018-12-04

## 2018-12-04 RX ORDER — ONDANSETRON 8 MG/1
4 TABLET, FILM COATED ORAL ONCE
Qty: 0 | Refills: 0 | Status: COMPLETED | OUTPATIENT
Start: 2018-12-04 | End: 2018-12-04

## 2018-12-04 RX ORDER — SODIUM CHLORIDE 9 MG/ML
3 INJECTION INTRAMUSCULAR; INTRAVENOUS; SUBCUTANEOUS ONCE
Qty: 0 | Refills: 0 | Status: COMPLETED | OUTPATIENT
Start: 2018-12-04 | End: 2018-12-04

## 2018-12-04 RX ORDER — OXYCODONE AND ACETAMINOPHEN 5; 325 MG/1; MG/1
1 TABLET ORAL EVERY 4 HOURS
Qty: 0 | Refills: 0 | Status: DISCONTINUED | OUTPATIENT
Start: 2018-12-04 | End: 2018-12-05

## 2018-12-04 RX ORDER — SODIUM CHLORIDE 9 MG/ML
1000 INJECTION, SOLUTION INTRAVENOUS
Qty: 0 | Refills: 0 | Status: DISCONTINUED | OUTPATIENT
Start: 2018-12-04 | End: 2018-12-05

## 2018-12-04 RX ADMIN — SODIUM CHLORIDE 75 MILLILITER(S): 9 INJECTION, SOLUTION INTRAVENOUS at 05:08

## 2018-12-04 RX ADMIN — MORPHINE SULFATE 4 MILLIGRAM(S): 50 CAPSULE, EXTENDED RELEASE ORAL at 21:15

## 2018-12-04 RX ADMIN — ONDANSETRON 4 MILLIGRAM(S): 8 TABLET, FILM COATED ORAL at 02:38

## 2018-12-04 RX ADMIN — FENTANYL CITRATE 50 MICROGRAM(S): 50 INJECTION INTRAVENOUS at 03:15

## 2018-12-04 RX ADMIN — OXYCODONE AND ACETAMINOPHEN 1 TABLET(S): 5; 325 TABLET ORAL at 22:24

## 2018-12-04 RX ADMIN — HYDROMORPHONE HYDROCHLORIDE 1 MILLIGRAM(S): 2 INJECTION INTRAMUSCULAR; INTRAVENOUS; SUBCUTANEOUS at 02:45

## 2018-12-04 RX ADMIN — SODIUM CHLORIDE 75 MILLILITER(S): 9 INJECTION, SOLUTION INTRAVENOUS at 12:20

## 2018-12-04 RX ADMIN — HYDROMORPHONE HYDROCHLORIDE 1 MILLIGRAM(S): 2 INJECTION INTRAMUSCULAR; INTRAVENOUS; SUBCUTANEOUS at 02:38

## 2018-12-04 RX ADMIN — OXYCODONE AND ACETAMINOPHEN 1 TABLET(S): 5; 325 TABLET ORAL at 23:13

## 2018-12-04 RX ADMIN — PANTOPRAZOLE SODIUM 40 MILLIGRAM(S): 20 TABLET, DELAYED RELEASE ORAL at 06:00

## 2018-12-04 RX ADMIN — OXYCODONE AND ACETAMINOPHEN 1 TABLET(S): 5; 325 TABLET ORAL at 10:53

## 2018-12-04 RX ADMIN — SODIUM CHLORIDE 3 MILLILITER(S): 9 INJECTION INTRAMUSCULAR; INTRAVENOUS; SUBCUTANEOUS at 02:37

## 2018-12-04 RX ADMIN — SODIUM CHLORIDE 1000 MILLILITER(S): 9 INJECTION INTRAMUSCULAR; INTRAVENOUS; SUBCUTANEOUS at 02:37

## 2018-12-04 RX ADMIN — FENTANYL CITRATE 50 MICROGRAM(S): 50 INJECTION INTRAVENOUS at 02:51

## 2018-12-04 RX ADMIN — OXYCODONE AND ACETAMINOPHEN 1 TABLET(S): 5; 325 TABLET ORAL at 14:29

## 2018-12-04 RX ADMIN — MORPHINE SULFATE 4 MILLIGRAM(S): 50 CAPSULE, EXTENDED RELEASE ORAL at 08:59

## 2018-12-04 RX ADMIN — MORPHINE SULFATE 4 MILLIGRAM(S): 50 CAPSULE, EXTENDED RELEASE ORAL at 20:26

## 2018-12-04 RX ADMIN — POLYETHYLENE GLYCOL 3350 17 GRAM(S): 17 POWDER, FOR SOLUTION ORAL at 18:19

## 2018-12-04 RX ADMIN — MORPHINE SULFATE 4 MILLIGRAM(S): 50 CAPSULE, EXTENDED RELEASE ORAL at 08:29

## 2018-12-04 RX ADMIN — OXYCODONE AND ACETAMINOPHEN 1 TABLET(S): 5; 325 TABLET ORAL at 11:23

## 2018-12-04 RX ADMIN — SIMETHICONE 80 MILLIGRAM(S): 80 TABLET, CHEWABLE ORAL at 08:08

## 2018-12-04 RX ADMIN — FENTANYL CITRATE 50 MICROGRAM(S): 50 INJECTION INTRAVENOUS at 02:45

## 2018-12-04 RX ADMIN — OXYCODONE AND ACETAMINOPHEN 1 TABLET(S): 5; 325 TABLET ORAL at 00:00

## 2018-12-04 NOTE — H&P ADULT - ASSESSMENT
as above, 37 yo male with obesity, ?CHRISTINE, hx of chronic cholecystitis and cholelithiasis, s/p  lap patrick yesterday, now presenting with worsened abd pain, diffuse.  ?post op ileus    Admit  IV hydration  Analgesics  IV PPI  Zofran PRN  Laxatives prn  Full liquids for now   Surg eval/ffup  DVT Prophylaxis    CAPRINI SCORE [CLOT]  [x ] BMI > 25 Kg/m2                                            (1 Point)                                                                                                                                             Total Score [  1  ]

## 2018-12-04 NOTE — H&P ADULT - NSHPPHYSICALEXAM_GEN_ALL_CORE
Vital Signs (24 Hrs):  T(C): 37.4 (12-04-18 @ 02:12), Max: 37.4 (12-04-18 @ 02:12)  HR: 81 (12-04-18 @ 02:45) (76 - 88)  BP: 162/93 (12-04-18 @ 02:45) (145/82 - 176/81)  RR: 16 (12-04-18 @ 02:45) (16 - 20)  SpO2: 100% (12-04-18 @ 02:45) (90% - 100%)  Daily Height in cm: 165.1 (04 Dec 2018 02:12)

## 2018-12-04 NOTE — PROGRESS NOTE ADULT - ASSESSMENT
38M w/ PMH as above admitted w/ post-op abdominal pain after lap patrick 12/3/18. Pt is vitally stable without peritoneal signs.

## 2018-12-04 NOTE — ED PROVIDER NOTE - CARE PLAN
Principal Discharge DX:	Abdominal pain Principal Discharge DX:	Abdominal pain  Secondary Diagnosis:	Vomiting

## 2018-12-04 NOTE — CONSULT NOTE ADULT - ASSESSMENT
non peritoneal abdominal exam  complaints and exam consistent with pneumoperitoneum(long case)  but we will observe today and order labs for am    will hydrate and consider antibioitics

## 2018-12-04 NOTE — H&P ADULT - NEUROLOGICAL DETAILS
responds to pain/cranial nerves intact/responds to verbal commands/sensation intact/deep reflexes intact/alert and oriented x 3

## 2018-12-04 NOTE — ED ADULT NURSE NOTE - NSIMPLEMENTINTERV_GEN_ALL_ED
Implemented All Universal Safety Interventions:  [place] to call system. Call bell, personal items and telephone within reach. Instruct patient to call for assistance. Room bathroom lighting operational. Non-slip footwear when patient is off stretcher. Physically safe environment: no spills, clutter or unnecessary equipment. Stretcher in lowest position, wheels locked, appropriate side rails in place.

## 2018-12-04 NOTE — ED PROVIDER NOTE - OBJECTIVE STATEMENT
pt had his gallbladder removed yesterday at [facility] by Dr. Pritchett, pt was discharge about 8 hours ago, returns to ER because of severe R sided abdominal pain around the area of the incision.  no vomiting or fever reported.  pt took 1 percocet around 9p without much relief.

## 2018-12-04 NOTE — H&P ADULT - FAMILY HISTORY
Family history of diabetes mellitus     Family history of Parkinson disease     Family history of cholelithiasis     Family history of thyroid disease     Family history of peripheral vascular disease     Sibling  Still living? Yes, Estimated age: 31-40  Family history of kidney stone, Age at diagnosis: Age Unknown

## 2018-12-04 NOTE — ED PROVIDER NOTE - PHYSICAL EXAMINATION
Gen:  alert, awake, no acute distress  HEENT:  atraumatic head, airway clear, pupils equal and round  CV:  rrr, nl S1, S2, no m/r/g  Pulm:  lungs CTA b/l  Abd: soft, mild ttp c/w post op pain  Ext:  moving all extremities  Neuro:  grossly intact, no focal deficits  Skin:  clear, dry, intact  Psych: AOx3, normal affect, no apparent risk to self or others

## 2018-12-04 NOTE — ED ADULT NURSE REASSESSMENT NOTE - NS ED NURSE REASSESS COMMENT FT1
MD Stevenson at bedside for patient evaluation. Temperature rechecked, Oral 99.1 F. Pt to be transferred to Telemetry as a med surge pt, no s/s of distress noted, breathing non labored pt awake alert and oriented x3.

## 2018-12-04 NOTE — CONSULT NOTE ADULT - SUBJECTIVE AND OBJECTIVE BOX
38 you male s/p laparoscopic cholecystectomy yesterday for interval procedure(had undergone tube cholecystostomy in August 2018). Had dense desmoplastic gallbladder but case went safely yesterday. Went home and had generalized abdominal pain and took percocet but vomited.    Also. c/o right shoulder pain.    exam(ER)  looks a little pale  temp 99  bp 130/80  pulse 91    Heent-sclera anicteric    abdomen-distended, non tender  trocar sites ok    labs:    Complete Blood Count + Automated Diff in AM (12.04.18 @ 05:50)    WBC Count: 13.7 K/uL    RBC Count: 4.64 M/uL    Hemoglobin: 12.5 g/dL    Hematocrit: 38.6 %    Mean Cell Volume: 83.1 fl    Mean Cell Hemoglobin: 27.0 pg    Mean Cell Hemoglobin Conc: 32.5 gm/dL    Red Cell Distrib Width: 12.9 %    Platelet Count - Automated: 298 K/uL    Auto Neutrophil #: 12.0 K/uL    Auto Lymphocyte #: 0.7 K/uL    Auto Monocyte #: 0.9 K/uL    Auto Eosinophil #: 0.0 K/uL    Auto Basophil #: 0.1 K/uL    Auto Neutrophil %: 87.6: Differential percentages must be correlated with absolute numbers for  clinical significance. %    Auto Lymphocyte %: 5.4 %    Auto Monocyte %: 6.6 %    Auto Eosinophil %: 0.0 %    Auto Basophil %: 0.4 %        Comprehensive Metabolic Panel (12.04.18 @ 02:15)    Sodium, Serum: 137 mmol/L    Potassium, Serum: 3.9 mmol/L    Chloride, Serum: 102 mmol/L    Carbon Dioxide, Serum: 26 mmol/L    Anion Gap, Serum: 9 mmol/L    Blood Urea Nitrogen, Serum: 12 mg/dL    Creatinine, Serum: 0.77 mg/dL    Glucose, Serum: 199 mg/dL    Calcium, Total Serum: 8.4 mg/dL    Protein Total, Serum: 8.3 g/dL    Albumin, Serum: 3.3 g/dL    Bilirubin Total, Serum: 0.2 mg/dL    Alkaline Phosphatase, Serum: 106 U/L    Aspartate Aminotransferase (AST/SGOT): 21 U/L    Alanine Aminotransferase (ALT/SGPT): 47 U/L DA    eGFR if Non : 114: Interpretative comment

## 2018-12-04 NOTE — H&P ADULT - MS EXT PE MLT D E PC
no pedal edema/no clubbing/no cyanosis Product 21 Application Directions: Apply to face twice daily \\nLot# [id number] Product 21 Application Directions: Apply to face twice daily \\nLot# [id number]

## 2018-12-04 NOTE — PROGRESS NOTE ADULT - PROBLEM SELECTOR PLAN 1
Post-operative abdominal pain after lap patrick 12/3/18 with Dr. Pritchett. Pt has leukocytosis however this is expected post-op.    Surgery recs - cont IVF, pain management, monitor for now. Will consider abx if condition worsens or develops fevers  Pain management  IVF  Continue CLD for now  OOB/ambulate  Daily labs

## 2018-12-04 NOTE — PROGRESS NOTE ADULT - SUBJECTIVE AND OBJECTIVE BOX
pm check      feels a little better  temp 99.2    exam-distended, a little tender    c/o right shoulder pain

## 2018-12-04 NOTE — ED PROVIDER NOTE - PROGRESS NOTE DETAILS
d/w Dr Pritchett, given that pt is not tolerate PO and will require pain control post op pt will require a short stay admission.  requesting admission to hospitalist.

## 2018-12-04 NOTE — PROGRESS NOTE ADULT - SUBJECTIVE AND OBJECTIVE BOX
38M w/ PMH of obesity, ?CHRISTINE, chronic cholecystitis and cholelithiasis, s/p lap patrick 12/3/18, was okay until later that night, had severe abdominal pain, took a Percocet at 9PM, them 1 AM, without relief. Pain worsened until this early morning, feeling bloated, nauseous, and pt felt feverish so decided to go to the ED. Temp was 99.9. Abd xray was neg.  Pt had hx of chronic cholecystitis, with cholelithiasis x about 3 years, until in 08/13/2018, he had a severe bout of acute cholecystitis, and had enterobacter bacteremia, tx with IV antibx.  He went to the OR on 08/13/18 to have cholecystectomy but gallbladder was too inflamed and adherent so had a cholecystostomy tube placed at that time. Pt had improved thereafter and had lap patrick performed 12/3/18.    12/4: Pt seen and examined at bedside. Pt is not feeling well, complaining of abdominal pain that makes breathing difficult. Is able to tolerate sips of clears and ice chips. Feels that his abdomen is distended.    REVIEW OF SYSTEMS:    CONSTITUTIONAL: + weakness, + feverish, no chills  EYES/ENT: No visual changes;  No vertigo or throat pain   NECK: No pain or stiffness  RESPIRATORY: No cough, wheezing, hemoptysis; some SOB associated w/ abdominal pain  CARDIOVASCULAR: No chest pain or palpitations  GASTROINTESTINAL: + abd pain, + distended  GENITOURINARY: No dysuria, frequency or hematuria   All other review of systems is negative unless indicated above.    Vital Signs Last 24 Hrs  T(C): 36.7 (12-04-18 @ 08:30)  T(F): 98.1 (12-04-18 @ 08:30), Max: 99.4 (12-04-18 @ 02:12)  HR: 83 (12-04-18 @ 08:30) (76 - 88)  BP: 149/83 (12-04-18 @ 08:30)  BP(mean): --  RR: 19 (12-04-18 @ 08:30) (16 - 20)  SpO2: 98% (12-04-18 @ 08:30) (90% - 100%)  Wt(kg): --    12-04 @ 07:01  -  12-04 @ 10:36  --------------------------------------------------------  IN: 120 mL / OUT: 0 mL / NET: 120 mL    PHYSICAL EXAM:    GENERAL: AOx3, NAD, well-groomed, well-developed, mild distress due to pain  HEAD:  NC/AT  EYES: EOMI, PERRLA, no scleral icterus  HEENT: Moist mucous membranes  NECK: Supple, No JVD  LUNG: Clear to auscultation bilaterally; No rales, rhonchi, wheezing, or rubs  HEART: RRR; No murmurs, rubs, or gallops  ABDOMEN: soft, + distended. Diffusely tender, worst at RUQ with guarding but no rigidity/rebound.  EXTREMITIES:  2+ Peripheral Pulses, No clubbing, cyanosis, or edema    Lab Results:                                            12.5                  Neurophils% (auto):   87.6   (12-04 @ 05:50):    13.7 )-----------(298          Lymphocytes% (auto):  5.4                                           38.6                   Eosinphils% (auto):   0.0      Manual%: Neutrophils x    ; Lymphocytes x    ; Eosinophils x    ; Bands%: x    ; Blasts x         Differential:	[] Automated		[] Manual    12-04    136  |  102  |  10  ----------------------------<  171<H>  4.1   |  24  |  0.68    Ca    8.2<L>      04 Dec 2018 05:50    TPro  8.3  /  Alb  3.3  /  TBili  0.2  /  DBili  x   /  AST  21  /  ALT  47<H>  /  AlkPhos  106  12-04    IMAGING    < from: Xray Abdomen 1 View Portable, IMMEDIATE (12.04.18 @ 02:58)   Slight increased fecal content is seen in the colon but there is no sign   of bowel obstruction. Clips in the right upper quadrant noted.    No gross organomegaly is seen.    Bones are grossly intact.    IMPRESSION: As above.    < end of copied text >

## 2018-12-05 ENCOUNTER — INPATIENT (INPATIENT)
Facility: HOSPITAL | Age: 38
LOS: 11 days | Discharge: HOME | DRG: 393 | End: 2018-12-17
Attending: SURGERY | Admitting: SURGERY
Payer: MEDICAID

## 2018-12-05 ENCOUNTER — TRANSCRIPTION ENCOUNTER (OUTPATIENT)
Age: 38
End: 2018-12-05

## 2018-12-05 VITALS
WEIGHT: 238.98 LBS | DIASTOLIC BLOOD PRESSURE: 85 MMHG | SYSTOLIC BLOOD PRESSURE: 157 MMHG | RESPIRATION RATE: 18 BRPM | HEIGHT: 65 IN | HEART RATE: 72 BPM | TEMPERATURE: 100 F | OXYGEN SATURATION: 95 %

## 2018-12-05 VITALS
SYSTOLIC BLOOD PRESSURE: 146 MMHG | TEMPERATURE: 101 F | RESPIRATION RATE: 16 BRPM | OXYGEN SATURATION: 97 % | DIASTOLIC BLOOD PRESSURE: 98 MMHG | HEART RATE: 116 BPM

## 2018-12-05 DIAGNOSIS — Z98.890 OTHER SPECIFIED POSTPROCEDURAL STATES: Chronic | ICD-10-CM

## 2018-12-05 DIAGNOSIS — A41.9 SEPSIS, UNSPECIFIED ORGANISM: ICD-10-CM

## 2018-12-05 DIAGNOSIS — Z90.49 ACQUIRED ABSENCE OF OTHER SPECIFIED PARTS OF DIGESTIVE TRACT: Chronic | ICD-10-CM

## 2018-12-05 DIAGNOSIS — R10.9 UNSPECIFIED ABDOMINAL PAIN: ICD-10-CM

## 2018-12-05 LAB
ALBUMIN SERPL ELPH-MCNC: 2.7 G/DL — LOW (ref 3.3–5)
ALP SERPL-CCNC: 95 U/L — SIGNIFICANT CHANGE UP (ref 40–120)
ALT FLD-CCNC: 40 U/L DA — SIGNIFICANT CHANGE UP (ref 10–45)
ANION GAP SERPL CALC-SCNC: 10 MMOL/L — SIGNIFICANT CHANGE UP (ref 5–17)
ANION GAP SERPL CALC-SCNC: 14 MMOL/L — SIGNIFICANT CHANGE UP (ref 5–17)
ANION GAP SERPL CALC-SCNC: 8 MMOL/L — SIGNIFICANT CHANGE UP (ref 5–17)
APTT BLD: 29.4 SEC — SIGNIFICANT CHANGE UP (ref 27.5–36.3)
AST SERPL-CCNC: 18 U/L — SIGNIFICANT CHANGE UP (ref 10–40)
BASOPHILS # BLD AUTO: 0.1 K/UL — SIGNIFICANT CHANGE UP (ref 0–0.2)
BASOPHILS # BLD AUTO: 0.1 K/UL — SIGNIFICANT CHANGE UP (ref 0–0.2)
BASOPHILS NFR BLD AUTO: 0.4 % — SIGNIFICANT CHANGE UP (ref 0–2)
BASOPHILS NFR BLD AUTO: 0.6 % — SIGNIFICANT CHANGE UP (ref 0–2)
BILIRUB SERPL-MCNC: 1.2 MG/DL — SIGNIFICANT CHANGE UP (ref 0.2–1.2)
BLD GP AB SCN SERPL QL: NEGATIVE — SIGNIFICANT CHANGE UP
BUN SERPL-MCNC: 10 MG/DL — SIGNIFICANT CHANGE UP (ref 7–23)
BUN SERPL-MCNC: 9 MG/DL — SIGNIFICANT CHANGE UP (ref 7–23)
BUN SERPL-MCNC: 9 MG/DL — SIGNIFICANT CHANGE UP (ref 7–23)
CALCIUM SERPL-MCNC: 8.6 MG/DL — SIGNIFICANT CHANGE UP (ref 8.4–10.5)
CALCIUM SERPL-MCNC: 8.8 MG/DL — SIGNIFICANT CHANGE UP (ref 8.4–10.5)
CALCIUM SERPL-MCNC: 8.9 MG/DL — SIGNIFICANT CHANGE UP (ref 8.4–10.5)
CHLORIDE SERPL-SCNC: 95 MMOL/L — LOW (ref 96–108)
CHLORIDE SERPL-SCNC: 96 MMOL/L — SIGNIFICANT CHANGE UP (ref 96–108)
CHLORIDE SERPL-SCNC: 98 MMOL/L — SIGNIFICANT CHANGE UP (ref 96–108)
CO2 SERPL-SCNC: 24 MMOL/L — SIGNIFICANT CHANGE UP (ref 22–31)
CO2 SERPL-SCNC: 24 MMOL/L — SIGNIFICANT CHANGE UP (ref 22–31)
CO2 SERPL-SCNC: 26 MMOL/L — SIGNIFICANT CHANGE UP (ref 22–31)
CREAT SERPL-MCNC: 0.47 MG/DL — LOW (ref 0.5–1.3)
CREAT SERPL-MCNC: 0.6 MG/DL — SIGNIFICANT CHANGE UP (ref 0.5–1.3)
CREAT SERPL-MCNC: 0.76 MG/DL — SIGNIFICANT CHANGE UP (ref 0.5–1.3)
EOSINOPHIL # BLD AUTO: 0 K/UL — SIGNIFICANT CHANGE UP (ref 0–0.5)
EOSINOPHIL # BLD AUTO: 0 K/UL — SIGNIFICANT CHANGE UP (ref 0–0.5)
EOSINOPHIL NFR BLD AUTO: 0 % — SIGNIFICANT CHANGE UP (ref 0–6)
EOSINOPHIL NFR BLD AUTO: 0.1 % — SIGNIFICANT CHANGE UP (ref 0–6)
GLUCOSE BLDC GLUCOMTR-MCNC: 174 MG/DL — HIGH (ref 70–99)
GLUCOSE SERPL-MCNC: 131 MG/DL — HIGH (ref 70–99)
GLUCOSE SERPL-MCNC: 139 MG/DL — HIGH (ref 70–99)
GLUCOSE SERPL-MCNC: 163 MG/DL — HIGH (ref 70–99)
HCT VFR BLD CALC: 43.5 % — SIGNIFICANT CHANGE UP (ref 39–50)
HCT VFR BLD CALC: 44.2 % — SIGNIFICANT CHANGE UP (ref 39–50)
HCT VFR BLD CALC: 44.7 % — SIGNIFICANT CHANGE UP (ref 39–50)
HGB BLD-MCNC: 14.4 G/DL — SIGNIFICANT CHANGE UP (ref 13–17)
HGB BLD-MCNC: 14.5 G/DL — SIGNIFICANT CHANGE UP (ref 13–17)
HGB BLD-MCNC: 15.3 G/DL — SIGNIFICANT CHANGE UP (ref 13–17)
INR BLD: 1.47 RATIO — HIGH (ref 0.88–1.16)
LACTATE SERPL-SCNC: 1.6 MMOL/L — SIGNIFICANT CHANGE UP (ref 0.7–2)
LYMPHOCYTES # BLD AUTO: 0.6 K/UL — LOW (ref 1–3.3)
LYMPHOCYTES # BLD AUTO: 1.1 K/UL — SIGNIFICANT CHANGE UP (ref 1–3.3)
LYMPHOCYTES # BLD AUTO: 3.1 % — LOW (ref 13–44)
LYMPHOCYTES # BLD AUTO: 6.6 % — LOW (ref 13–44)
MCHC RBC-ENTMCNC: 26.8 PG — LOW (ref 27–34)
MCHC RBC-ENTMCNC: 27.4 PG — SIGNIFICANT CHANGE UP (ref 27–34)
MCHC RBC-ENTMCNC: 28.3 PG — SIGNIFICANT CHANGE UP (ref 27–34)
MCHC RBC-ENTMCNC: 32.4 GM/DL — SIGNIFICANT CHANGE UP (ref 32–36)
MCHC RBC-ENTMCNC: 33 GM/DL — SIGNIFICANT CHANGE UP (ref 32–36)
MCHC RBC-ENTMCNC: 34.7 GM/DL — SIGNIFICANT CHANGE UP (ref 32–36)
MCV RBC AUTO: 81.6 FL — SIGNIFICANT CHANGE UP (ref 80–100)
MCV RBC AUTO: 82.6 FL — SIGNIFICANT CHANGE UP (ref 80–100)
MCV RBC AUTO: 83 FL — SIGNIFICANT CHANGE UP (ref 80–100)
MONOCYTES # BLD AUTO: 1.2 K/UL — HIGH (ref 0–0.9)
MONOCYTES # BLD AUTO: 1.4 K/UL — HIGH (ref 0–0.9)
MONOCYTES NFR BLD AUTO: 6.8 % — SIGNIFICANT CHANGE UP (ref 2–14)
MONOCYTES NFR BLD AUTO: 7.1 % — SIGNIFICANT CHANGE UP (ref 1–9)
NEUTROPHILS # BLD AUTO: 14.3 K/UL — HIGH (ref 1.8–7.4)
NEUTROPHILS # BLD AUTO: 18.7 K/UL — HIGH (ref 1.8–7.4)
NEUTROPHILS NFR BLD AUTO: 85.7 % — HIGH (ref 43–77)
NEUTROPHILS NFR BLD AUTO: 89.8 % — HIGH (ref 43–77)
PLATELET # BLD AUTO: 307 K/UL — SIGNIFICANT CHANGE UP (ref 150–400)
PLATELET # BLD AUTO: 313 K/UL — SIGNIFICANT CHANGE UP (ref 150–400)
PLATELET # BLD AUTO: 325 K/UL — SIGNIFICANT CHANGE UP (ref 150–400)
POTASSIUM SERPL-MCNC: 4.1 MMOL/L — SIGNIFICANT CHANGE UP (ref 3.5–5.3)
POTASSIUM SERPL-MCNC: 4.1 MMOL/L — SIGNIFICANT CHANGE UP (ref 3.5–5.3)
POTASSIUM SERPL-MCNC: 4.3 MMOL/L — SIGNIFICANT CHANGE UP (ref 3.5–5.3)
POTASSIUM SERPL-SCNC: 4.1 MMOL/L — SIGNIFICANT CHANGE UP (ref 3.5–5.3)
POTASSIUM SERPL-SCNC: 4.1 MMOL/L — SIGNIFICANT CHANGE UP (ref 3.5–5.3)
POTASSIUM SERPL-SCNC: 4.3 MMOL/L — SIGNIFICANT CHANGE UP (ref 3.5–5.3)
PROT SERPL-MCNC: 7.7 G/DL — SIGNIFICANT CHANGE UP (ref 6–8.3)
PROTHROM AB SERPL-ACNC: 16.9 SEC — HIGH (ref 10–12.9)
RBC # BLD: 5.25 M/UL — SIGNIFICANT CHANGE UP (ref 4.2–5.8)
RBC # BLD: 5.41 M/UL — SIGNIFICANT CHANGE UP (ref 4.2–5.8)
RBC # BLD: 5.42 M/UL — SIGNIFICANT CHANGE UP (ref 4.2–5.8)
RBC # FLD: 13 % — SIGNIFICANT CHANGE UP (ref 10.3–14.5)
RBC # FLD: 13.1 % — SIGNIFICANT CHANGE UP (ref 10.3–14.5)
RBC # FLD: 13.2 % — SIGNIFICANT CHANGE UP (ref 10.3–14.5)
RH IG SCN BLD-IMP: POSITIVE — SIGNIFICANT CHANGE UP
SODIUM SERPL-SCNC: 130 MMOL/L — LOW (ref 135–145)
SODIUM SERPL-SCNC: 132 MMOL/L — LOW (ref 135–145)
SODIUM SERPL-SCNC: 133 MMOL/L — LOW (ref 135–145)
SURGICAL PATHOLOGY STUDY: SIGNIFICANT CHANGE UP
TROPONIN I SERPL-MCNC: <.017 NG/ML — LOW (ref 0.02–0.06)
WBC # BLD: 16.7 K/UL — HIGH (ref 3.8–10.5)
WBC # BLD: 20.8 K/UL — HIGH (ref 3.8–10.5)
WBC # BLD: 24.9 K/UL — HIGH (ref 3.8–10.5)
WBC # FLD AUTO: 16.7 K/UL — HIGH (ref 3.8–10.5)
WBC # FLD AUTO: 20.8 K/UL — HIGH (ref 3.8–10.5)
WBC # FLD AUTO: 24.9 K/UL — HIGH (ref 3.8–10.5)

## 2018-12-05 PROCEDURE — 71045 X-RAY EXAM CHEST 1 VIEW: CPT | Mod: 26

## 2018-12-05 PROCEDURE — 49406 IMAGE CATH FLUID PERI/RETRO: CPT

## 2018-12-05 PROCEDURE — 74177 CT ABD & PELVIS W/CONTRAST: CPT | Mod: 26

## 2018-12-05 RX ORDER — PIPERACILLIN AND TAZOBACTAM 4; .5 G/20ML; G/20ML
3.38 INJECTION, POWDER, LYOPHILIZED, FOR SOLUTION INTRAVENOUS ONCE
Qty: 0 | Refills: 0 | Status: COMPLETED | OUTPATIENT
Start: 2018-12-05 | End: 2018-12-05

## 2018-12-05 RX ORDER — ACETAMINOPHEN 500 MG
2 TABLET ORAL
Qty: 0 | Refills: 0 | COMMUNITY
Start: 2018-12-05

## 2018-12-05 RX ORDER — SODIUM CHLORIDE 9 MG/ML
1000 INJECTION, SOLUTION INTRAVENOUS
Qty: 0 | Refills: 0 | Status: DISCONTINUED | OUTPATIENT
Start: 2018-12-05 | End: 2018-12-07

## 2018-12-05 RX ORDER — MORPHINE SULFATE 50 MG/1
1 CAPSULE, EXTENDED RELEASE ORAL
Qty: 0 | Refills: 0 | COMMUNITY
Start: 2018-12-05

## 2018-12-05 RX ORDER — IOHEXOL 300 MG/ML
30 INJECTION, SOLUTION INTRAVENOUS ONCE
Qty: 0 | Refills: 0 | Status: COMPLETED | OUTPATIENT
Start: 2018-12-05 | End: 2018-12-05

## 2018-12-05 RX ORDER — HYDROMORPHONE HYDROCHLORIDE 2 MG/ML
0.5 INJECTION INTRAMUSCULAR; INTRAVENOUS; SUBCUTANEOUS ONCE
Qty: 0 | Refills: 0 | Status: DISCONTINUED | OUTPATIENT
Start: 2018-12-05 | End: 2018-12-05

## 2018-12-05 RX ORDER — PIPERACILLIN AND TAZOBACTAM 4; .5 G/20ML; G/20ML
3.38 INJECTION, POWDER, LYOPHILIZED, FOR SOLUTION INTRAVENOUS
Qty: 0 | Refills: 0 | COMMUNITY
Start: 2018-12-05

## 2018-12-05 RX ORDER — ONDANSETRON 8 MG/1
4 TABLET, FILM COATED ORAL
Qty: 0 | Refills: 0 | COMMUNITY
Start: 2018-12-05

## 2018-12-05 RX ORDER — HYDROMORPHONE HYDROCHLORIDE 2 MG/ML
1 INJECTION INTRAMUSCULAR; INTRAVENOUS; SUBCUTANEOUS ONCE
Qty: 0 | Refills: 0 | Status: DISCONTINUED | OUTPATIENT
Start: 2018-12-05 | End: 2018-12-05

## 2018-12-05 RX ORDER — ACETAMINOPHEN 500 MG
2 TABLET ORAL
Qty: 0 | Refills: 0 | COMMUNITY

## 2018-12-05 RX ORDER — ONDANSETRON 8 MG/1
4 TABLET, FILM COATED ORAL ONCE
Qty: 0 | Refills: 0 | Status: DISCONTINUED | OUTPATIENT
Start: 2018-12-05 | End: 2018-12-05

## 2018-12-05 RX ORDER — POLYETHYLENE GLYCOL 3350 17 G/17G
17 POWDER, FOR SOLUTION ORAL
Qty: 0 | Refills: 0 | COMMUNITY
Start: 2018-12-05

## 2018-12-05 RX ORDER — SODIUM CHLORIDE 9 MG/ML
500 INJECTION, SOLUTION INTRAVENOUS ONCE
Qty: 0 | Refills: 0 | Status: DISCONTINUED | OUTPATIENT
Start: 2018-12-05 | End: 2018-12-05

## 2018-12-05 RX ORDER — PIPERACILLIN AND TAZOBACTAM 4; .5 G/20ML; G/20ML
3.38 INJECTION, POWDER, LYOPHILIZED, FOR SOLUTION INTRAVENOUS EVERY 8 HOURS
Qty: 0 | Refills: 0 | Status: DISCONTINUED | OUTPATIENT
Start: 2018-12-05 | End: 2018-12-08

## 2018-12-05 RX ORDER — MAGNESIUM HYDROXIDE 400 MG/1
30 TABLET, CHEWABLE ORAL
Qty: 0 | Refills: 0 | COMMUNITY
Start: 2018-12-05

## 2018-12-05 RX ORDER — ACETAMINOPHEN 500 MG
1000 TABLET ORAL ONCE
Qty: 0 | Refills: 0 | Status: COMPLETED | OUTPATIENT
Start: 2018-12-05 | End: 2018-12-05

## 2018-12-05 RX ORDER — HYDROMORPHONE HYDROCHLORIDE 2 MG/ML
0.5 INJECTION INTRAMUSCULAR; INTRAVENOUS; SUBCUTANEOUS
Qty: 0 | Refills: 0 | Status: DISCONTINUED | OUTPATIENT
Start: 2018-12-05 | End: 2018-12-05

## 2018-12-05 RX ORDER — SODIUM CHLORIDE 9 MG/ML
1000 INJECTION, SOLUTION INTRAVENOUS
Qty: 0 | Refills: 0 | Status: DISCONTINUED | OUTPATIENT
Start: 2018-12-05 | End: 2018-12-05

## 2018-12-05 RX ORDER — PIPERACILLIN AND TAZOBACTAM 4; .5 G/20ML; G/20ML
3.38 INJECTION, POWDER, LYOPHILIZED, FOR SOLUTION INTRAVENOUS EVERY 8 HOURS
Qty: 0 | Refills: 0 | Status: DISCONTINUED | OUTPATIENT
Start: 2018-12-05 | End: 2018-12-05

## 2018-12-05 RX ADMIN — MORPHINE SULFATE 4 MILLIGRAM(S): 50 CAPSULE, EXTENDED RELEASE ORAL at 04:13

## 2018-12-05 RX ADMIN — Medication 400 MILLIGRAM(S): at 17:18

## 2018-12-05 RX ADMIN — MORPHINE SULFATE 4 MILLIGRAM(S): 50 CAPSULE, EXTENDED RELEASE ORAL at 03:32

## 2018-12-05 RX ADMIN — Medication 1000 MILLIGRAM(S): at 17:50

## 2018-12-05 RX ADMIN — PIPERACILLIN AND TAZOBACTAM 200 GRAM(S): 4; .5 INJECTION, POWDER, LYOPHILIZED, FOR SOLUTION INTRAVENOUS at 08:35

## 2018-12-05 RX ADMIN — Medication 650 MILLIGRAM(S): at 11:23

## 2018-12-05 RX ADMIN — SODIUM CHLORIDE 75 MILLILITER(S): 9 INJECTION, SOLUTION INTRAVENOUS at 08:33

## 2018-12-05 RX ADMIN — HYDROMORPHONE HYDROCHLORIDE 0.5 MILLIGRAM(S): 2 INJECTION INTRAMUSCULAR; INTRAVENOUS; SUBCUTANEOUS at 08:25

## 2018-12-05 RX ADMIN — OXYCODONE AND ACETAMINOPHEN 1 TABLET(S): 5; 325 TABLET ORAL at 06:56

## 2018-12-05 RX ADMIN — OXYCODONE AND ACETAMINOPHEN 1 TABLET(S): 5; 325 TABLET ORAL at 05:32

## 2018-12-05 RX ADMIN — MORPHINE SULFATE 4 MILLIGRAM(S): 50 CAPSULE, EXTENDED RELEASE ORAL at 06:53

## 2018-12-05 RX ADMIN — HYDROMORPHONE HYDROCHLORIDE 1 MILLIGRAM(S): 2 INJECTION INTRAMUSCULAR; INTRAVENOUS; SUBCUTANEOUS at 00:00

## 2018-12-05 RX ADMIN — HYDROMORPHONE HYDROCHLORIDE 0.5 MILLIGRAM(S): 2 INJECTION INTRAMUSCULAR; INTRAVENOUS; SUBCUTANEOUS at 11:16

## 2018-12-05 RX ADMIN — HYDROMORPHONE HYDROCHLORIDE 0.5 MILLIGRAM(S): 2 INJECTION INTRAMUSCULAR; INTRAVENOUS; SUBCUTANEOUS at 21:20

## 2018-12-05 RX ADMIN — HYDROMORPHONE HYDROCHLORIDE 1 MILLIGRAM(S): 2 INJECTION INTRAMUSCULAR; INTRAVENOUS; SUBCUTANEOUS at 11:16

## 2018-12-05 RX ADMIN — IOHEXOL 30 MILLILITER(S): 300 INJECTION, SOLUTION INTRAVENOUS at 08:45

## 2018-12-05 RX ADMIN — HYDROMORPHONE HYDROCHLORIDE 0.5 MILLIGRAM(S): 2 INJECTION INTRAMUSCULAR; INTRAVENOUS; SUBCUTANEOUS at 21:05

## 2018-12-05 RX ADMIN — SODIUM CHLORIDE 125 MILLILITER(S): 9 INJECTION, SOLUTION INTRAVENOUS at 17:18

## 2018-12-05 RX ADMIN — PIPERACILLIN AND TAZOBACTAM 25 GRAM(S): 4; .5 INJECTION, POWDER, LYOPHILIZED, FOR SOLUTION INTRAVENOUS at 22:11

## 2018-12-05 NOTE — H&P ADULT - ASSESSMENT
ASSESSMENT  38y male s/p laparoscopic cholecystectomy two days ago at OSH, readmitted for abdominal pain and fevers, CT suspicious for biloma    PLAN  - Diet: NPO with IVF  - Zosyn  - IR consult for drainage of biloma  - Pain control with PO/IV medications.    - Continue home medications  - OOB, ambulate as tolerated  - Admission labs ASSESSMENT  38y male s/p laparoscopic cholecystectomy two days ago at OSH, readmitted for abdominal pain and fevers, CT suspicious for biloma    PLAN  - Diet: NPO with IVF  - Zosyn  - IR consult for drainage of biloma  - possible GI consult for ERCP   - Pain control with PO/IV medications.    - Continue home medications  - OOB, ambulate as tolerated  - Admission labs

## 2018-12-05 NOTE — PROGRESS NOTE ADULT - SUBJECTIVE AND OBJECTIVE BOX
Interventional Radiology Brief- Operative Note    Operators: Emory Crews MD    Procedure: US and fluoroscopically guided right upper quadrant chrystal-hepatic drain placement    Post-op Dx: s/p cholecystectomy concern for bile leak    EBL: 7 mL    Medications: 1% lidocaine, sedation per anesthesia    Findings/Plan:   1.	A subcostal drain was initially placed, however, no fluid was able to be aspirated. The position of the drain was assessed with ravi-CT and contrast injection. The location of the drain was unable to be fully determined to be subhepatic vs. transhepatic.   2.	A second drain was placed through an intercostal approach and successfully negotiated into the right subdiaphragmatic space. Bilious fluid was removed and send for analysis and the subcostal drain was removed.  3.	A Hb/Hct level was ordered to be drawn at 12:30 AM and serial hb/hct levels are recommended at this time to assess for possible hemorrhage.   4.	Recommend close monitoring of vital signs for the next 4 hrs  5.	These findings were communicated to Dr. Kramer by Dr. Crews via telephone on 8:51 pm on 12/5/2018

## 2018-12-05 NOTE — DISCHARGE NOTE ADULT - SECONDARY DIAGNOSIS.
Prediabetes CHRISTINE (obstructive sleep apnea) Gastroesophageal reflux disease, esophagitis presence not specified Sepsis, due to unspecified organism

## 2018-12-05 NOTE — DISCHARGE NOTE ADULT - CARE PROVIDERS DIRECT ADDRESSES
[email or web address]

## 2018-12-05 NOTE — PROGRESS NOTE ADULT - ATTENDING COMMENTS
I spoke to patient and mother
as above
Family Medicine Attending Addendum  Patient was seen on rounds, interviewed and examined with Dr. Loredo. Medical Record reviewed. History, review of systems, physical findings and lab results as documented confirmed, except as modified by me. Agree with management plan as described except as modified below.
Family Medicine Attending Addendum  Patient was seen on rounds, interviewed and examined with Dr. Loredo. Medical Record reviewed. History, review of systems, physical findings and lab results as documented confirmed, except as modified by me. Agree with management plan as described except as modified below.    Dr. Pritchett's note apprecited.

## 2018-12-05 NOTE — CHART NOTE - NSCHARTNOTEFT_GEN_A_CORE
Post-procedure check    SUBJECTIVE: No acute events in the immediate post-operative period. Pain well controlled.   Denies n/v, cp.    OBJECTIVE:  T(C): 37.9 (12-05-18 @ 17:41), Max: 38.9 (12-05-18 @ 09:24)  HR: 116 (12-05-18 @ 17:41) (72 - 116)  BP: 120/82 (12-05-18 @ 17:41) (120/82 - 157/85)  RR: 18 (12-05-18 @ 17:41) (16 - 18)  SpO2: 95% (12-05-18 @ 17:41) (94% - 97%)      12-05-18 @ 07:01  -  12-05-18 @ 22:36  --------------------------------------------------------  IN: 525 mL / OUT: 600 mL / NET: -75 mL        Physical Exam:   - Constitutional: AOx3, NAD  - Respiratory: nonlabored  - Abdomen: soft, moderately distended, appropriately tender; c/d/i incisions; IR drain w/ bilious output      ASSESSMENT:   JARRETT BURROUGHS is a 38y Male POD#0 from right upper quadrant perihepatic collection    PLAN:  - Pain management  - Follow UOP  - monitor VS  - diet: clears as tolerated  - dvt ppx, oob/ambulate as tolerated  - Appropriate for transfer to the floor Post-procedure check    SUBJECTIVE: No acute events in the immediate post-operative period. Pain well controlled.   Denies n/v, cp.    OBJECTIVE:  T(C): 37.9 (12-05-18 @ 17:41), Max: 38.9 (12-05-18 @ 09:24)  HR: 116 (12-05-18 @ 17:41) (72 - 116)  BP: 120/82 (12-05-18 @ 17:41) (120/82 - 157/85)  RR: 18 (12-05-18 @ 17:41) (16 - 18)  SpO2: 95% (12-05-18 @ 17:41) (94% - 97%)      12-05-18 @ 07:01  -  12-05-18 @ 22:36  --------------------------------------------------------  IN: 525 mL / OUT: 600 mL / NET: -75 mL        Physical Exam:   - Constitutional: AOx3, NAD  - Respiratory: nonlabored  - Abdomen: soft, moderately distended, appropriately tender; c/d/i incisions; IR drain w/ bilious output      ASSESSMENT:   JARRETT BURROUGHS is a 38y Male POD#0 from IR drainage of right upper quadrant perihepatic fluid collection    PLAN:  - Pain management  - Follow UOP  - monitor VS  - diet: clears as tolerated  - dvt ppx, oob/ambulate as tolerated  - Appropriate for transfer to the floor Post-procedure check    SUBJECTIVE: No acute events in the immediate post-operative period. Pain well controlled.   Denies n/v, cp.    OBJECTIVE:  T(C): 37.9 (12-05-18 @ 17:41), Max: 38.9 (12-05-18 @ 09:24)  HR: 116 (12-05-18 @ 17:41) (72 - 116)  BP: 120/82 (12-05-18 @ 17:41) (120/82 - 157/85)  RR: 18 (12-05-18 @ 17:41) (16 - 18)  SpO2: 95% (12-05-18 @ 17:41) (94% - 97%)      12-05-18 @ 07:01  -  12-05-18 @ 22:36  --------------------------------------------------------  IN: 525 mL / OUT: 600 mL / NET: -75 mL        Physical Exam:   - Constitutional: AOx3, NAD  - Respiratory: nonlabored  - Abdomen: soft, moderately distended, appropriately tender; c/d/i incisions; IR drain w/ bilious output      ASSESSMENT:   JARRETT BURROUGHS is a 38y Male POD#0 from US and fluoroscopy guided IR placement of right upper quadrant perihepatic drain    PLAN:  - Pain management  - Follow UOP  - monitor VS  - diet: clears as tolerated  - dvt ppx, oob/ambulate as tolerated  - Appropriate for transfer to the floor

## 2018-12-05 NOTE — H&P ADULT - NSHPLABSRESULTS_GEN_ALL_CORE
LABS  CBC (12-05 @ 10:35)                              14.5                           20.8<H>  )----------------(  325        89.8<H>% Neutrophils, 3.1<L>% Lymphocytes, ANC: 18.7<H>                              44.7    CBC (12-05 @ 06:05)                              14.4                           16.7<H>  )----------------(  313        85.7<H>% Neutrophils, 6.6<L>% Lymphocytes, ANC: 14.3<H>                              43.5      BMP (12-05 @ 10:35)             130<L>  |  96      |  9     		Ca++ --      Ca 8.6                ---------------------------------( 163<H>		Mg --                 4.1     |  26      |  0.76  			Ph --      BMP (12-05 @ 06:05)             132<L>  |  98      |  9     		Ca++ --      Ca 8.8                ---------------------------------( 139<H>		Mg --                 4.1     |  24      |  0.60  			Ph --        LFTs (12-05 @ 10:35)      TPro 7.7 / Alb 2.7<L> / TBili 1.2 / DBili -- / AST 18 / ALT 40 / AlkPhos 95        ABG (12-05 @ 12:00)      /  /  /  /  / %     Lactate:  1.6      IMAGING STUDIES      CT AP from [place]:  IMPRESSION:  There is a large subcapsular homogeneous in attenuation   perihepatic fluid collection identified. Fluid and air is identified   within the gallbladder fossa adjacent to surgical clips. Fluid is also   identified tracking along the right paracolic gutter with free fluid   identified within the deep pelvis. Findings suspicious for bile leak in   this patient status post laparoscopic cholecystectomy. Segmental wall   thickening of the colon is identified adjacent to the gallbladder fossa.   Stranding of the fat within the gallbladder fossa region noted.

## 2018-12-05 NOTE — H&P ADULT - NSHPPHYSICALEXAM_GEN_ALL_CORE
VITALS  T(C): 37.6 (12-05-18 @ 13:50), Max: 38.9 (12-05-18 @ 09:24)  HR: 72 (12-05-18 @ 13:50) (72 - 116)  BP: 157/85 (12-05-18 @ 13:50) (141/86 - 157/85)  RR: 18 (12-05-18 @ 13:50) (15 - 18)  SpO2: 95% (12-05-18 @ 13:50) (94% - 98%)  CAPILLARY BLOOD GLUCOSE      POCT Blood Glucose.: 174 mg/dL (05 Dec 2018 09:38)    Is/Os    PHYSICAL EXAM:  General: NAD, Lying in bed, diaphoretic, alert, oriented x3  Pulm: Non-labored breathing on 2L NC  GI/Abd: Distended, tender to palpation in RUQ and around old drain site, lap sites x4 appear dry with steristrips intact, no rebound tenderness or guarding, old drain site healing well

## 2018-12-05 NOTE — DISCHARGE NOTE ADULT - MEDICATION SUMMARY - MEDICATIONS TO TAKE
I will START or STAY ON the medications listed below when I get home from the hospital:    acetaminophen 325 mg oral tablet  -- 2 tab(s) by mouth every 6 hours, As needed, Temp greater or equal to 38C (100.4F), Mild Pain (1 - 3)  -- Indication: For Pain or fever    oxyCODONE-acetaminophen 5 mg-325 mg oral tablet  -- 1 tab(s) by mouth every 4 hours, As needed, Moderate Pain (4 - 6)  -- Indication: For Pain    morphine 4 mg/mL preservative-free intravenous solution  -- 1 milliliter(s) intravenous every 3 hours, As needed, Severe Pain (7 - 10)  -- Indication: For Pain    magnesium hydroxide 8% oral suspension  -- 30 milliliter(s) by mouth once, As needed, Constipation  -- Indication: For Constipation    ondansetron 2 mg/mL injectable solution  -- 4 milligram(s) injectable every 6 hours, As Needed nausea and/or vomiting  -- Indication: For Nausea/vomiting    polyethylene glycol 3350 oral powder for reconstitution  -- 17 gram(s) by mouth once a day  -- Indication: For Constipation    piperacillin-tazobactam 2 g-0.25 g intravenous injection  -- 3.375 gram(s) intravenous every 8 hours for bile leak  -- Indication: For Bile leak

## 2018-12-05 NOTE — PROGRESS NOTE ADULT - SUBJECTIVE AND OBJECTIVE BOX
pod#2  s/p laparoscopic cholecystectomy    called by my pa at 8am patient "diaphoretic and peritoneal"  temp 100(orally)  bp 130/80  pulse 101  O2sat 95 %    exam  patient in mild distress c/o ruq pain and shortness of breath, no bm or flatus  denies generalized abdominal pain    Heent-sclera anicteric  lungs-dec bs on right  abdomen-soft, mild distention, +, ruq 3+/4+ tenderness, left upper and lower quadrants non tender  not rigid    labs:  Complete Blood Count + Automated Diff in AM (12.05.18 @ 06:05)    WBC Count: 16.7 K/uL    RBC Count: 5.25 M/uL    Hemoglobin: 14.4 g/dL    Hematocrit: 43.5 %    Mean Cell Volume: 83.0 fl    Mean Cell Hemoglobin: 27.4 pg    Mean Cell Hemoglobin Conc: 33.0 gm/dL    Red Cell Distrib Width: 13.0 %    Platelet Count - Automated: 313 K/uL    Auto Neutrophil #: 14.3 K/uL    Auto Lymphocyte #: 1.1 K/uL    Auto Monocyte #: 1.2 K/uL    Auto Eosinophil #: 0.0 K/uL    Auto Basophil #: 0.1 K/uL    Auto Neutrophil %: 85.7: Differential percentages must be correlated with absolute numbers for  clinical significance. %    Auto Lymphocyte %: 6.6 %    Auto Monocyte %: 7.1 %    Auto Eosinophil %: 0.0 %    Auto Basophil %: 0.6 %    Comprehensive Metabolic Panel (12.04.18 @ 02:15)    Sodium, Serum: 137 mmol/L    Potassium, Serum: 3.9 mmol/L    Chloride, Serum: 102 mmol/L    Carbon Dioxide, Serum: 26 mmol/L    Anion Gap, Serum: 9 mmol/L    Blood Urea Nitrogen, Serum: 12 mg/dL    Creatinine, Serum: 0.77 mg/dL    Glucose, Serum: 199 mg/dL    Calcium, Total Serum: 8.4 mg/dL    Protein Total, Serum: 8.3 g/dL    Albumin, Serum: 3.3 g/dL    Bilirubin Total, Serum: 0.2 mg/dL    Alkaline Phosphatase, Serum: 106 U/L    Aspartate Aminotransferase (AST/SGOT): 21 U/L    Alanine Aminotransferase (ALT/SGPT): 47 U/L DA

## 2018-12-05 NOTE — DISCHARGE NOTE ADULT - PATIENT PORTAL LINK FT
You can access the [facility] Patient Portal, offered by [facility], by registering with the following website: [email or web address]

## 2018-12-05 NOTE — DISCHARGE NOTE ADULT - PLAN OF CARE
Resolution of pain S/p lap patrick 12/3/18 by Dr. Pritchett at [facility] and DCed home say day. Returned <24 hours later to [facility] for worsening abdominal pain, found to have likely bile leak.  Continue IV abx, NPO, IVF  Transfer to [facility] w/ Dr. Stanley Chen (surgery), pt will need IR intervention to drain bile. When can tolerate PO, start consistent carbohydrate diet S/p lap patrick 12/3/18 by Dr. Pritchett at [facility] and DCed home say day. Returned <24 hours later to [facility] for worsening abdominal pain, found to have likely bile leak.  Continue IV abx, NPO, IVF  Transfer to [facility] w/ Dr. Stanley Chen (surgery), pt will need IR intervention to drain bile.  Pt had rapid response today 12/5/18 for tachypnea- PLEASE FOLLOW UP TROPONIN LEVEL ON TRANSFER. LOW SUSPICION FOR CARDIAC CAUSE OF TACHYPNEA HOWEVER GIVEN THAT THERE WAS MENTION OF CHEST PAIN WITH TACHYPNEA IT WAS ORDERED AND SENT, NOT YET RESULTED AT TIME OF TRANSFER. S/p lap patrick 12/3/18 by Dr. Pritchett at [facility] and DCed home say day. Returned <24 hours later to [facility] for worsening abdominal pain, found to have likely bile leak.  Continue IV abx, NPO, IVF  Transfer to [facility] w/ Dr. Stanley Chen (surgery), pt will need IR intervention to drain bile.  Pt had rapid response today 12/5/18 for tachypnea- PLEASE DRAW REPEAT TROPONIN LEVEL ON TRANSFER. LOW SUSPICION FOR CARDIAC CAUSE OF TACHYPNEA HOWEVER GIVEN THAT THERE WAS MENTION OF CHEST PAIN WITH TACHYPNEA IT WAS ORDERED AND SENT. Resulted as troponin = <0.017 at 11:25 AM. On 5 Dec 18 patient was noted to have Fever to 102, Heart rate of 105and Leukocytosis  - WBC:16.7, consistent with a diagnosis of sepsis.

## 2018-12-05 NOTE — PROGRESS NOTE ADULT - ASSESSMENT
38M w/ PMH as above admitted w/ post-op abdominal pain after lap patrick 12/3/18. Pt is vitally stable without peritoneal signs, however has low grade temperature this AM to ~100F and with tachycardia. Need to rule out intra-abdominal pathology (bile leak vs bowel injury, etc).

## 2018-12-05 NOTE — CONSULT NOTE ADULT - PROBLEM SELECTOR RECOMMENDATION 9
fluids, abx, ct abd. pending, possible RTOR washout, likely ICU transfer postop. fluids, abx, ct abd. pending, possible RTOR washout, likely ICU transfer postop.  now prelim ct scan results show bile leak-pt. to be transferred to [place] for IR management of this condition.

## 2018-12-05 NOTE — DISCHARGE NOTE ADULT - OTHER SIGNIFICANT FINDINGS
< from: CT Abdomen and Pelvis w/ Oral Cont and w/ IV Cont (12.05.18 @ 10:15) >  There is a large subcapsular homogeneous in attenuation perihepatic fluid   collection identified. Fluid and air is identified within the gallbladder   fossa adjacent to surgical clips. Fluid is also identified tracking along   the right paracolic gutter with free fluid identified within the deep   pelvis. Segmental wall thickening of the colon is identified adjacent to   the gallbladder fossa. Stranding of the fat within the gallbladder fossa   region noted. Foci of air attenuationidentified inferior to the right   hemidiaphragm and adjacent to the cecal region likely related to recent   laparoscopic surgical intervention.    Scalloping of the hepatic parenchymal margins identified likely related   to the subcapsular fluid collection. Decreased attenuation of the hepatic   parenchyma suggests advanced hepatic parenchymal fatty infiltration. No   focal hepatic masses are identified. There is no evidence for   intrahepatic or extrahepatic biliary dilatation.  The spleen, pancreas and adrenal glands are unremarkable.    There is no evidence for bilateral hydronephrosis, renal calculi or   space-occupying lesions of the renal parenchyma. The abdominal aorta is   normal in course and caliber. Subcentimeter retroperitoneal para-aortic   lymph nodes noted, stable.    Fecal material is scattered throughout the colon. There is no evidence   for mechanical bowel obstruction. The appendix is not visualized;   correlate with surgical history. The bladder appears unremarkable.     Imaging of the lung bases demonstrates opacity within the right lower   lobe, likely secondary to atelectatic change; underlying parenchymal   consolidation cannot be excluded. Band type opacities within the left   lower lobe likely atelectatic innature. Small right pleural effusion.    No acute osseous fractures evident.    IMPRESSION:  There is a large subcapsular homogeneous in attenuation   perihepatic fluid collection identified. Fluid and air is identified   within the gallbladder fossaadjacent to surgical clips. Fluid is also   identified tracking along the right paracolic gutter with free fluid   identified within the deep pelvis. Findings suspicious for bile leak in   this patient status post laparoscopic cholecystectomy. Segmental wall   thickening of the colon is identified adjacent to the gallbladder fossa.   Stranding of the fat within the gallbladder fossa region noted.

## 2018-12-05 NOTE — PROGRESS NOTE ADULT - PROBLEM SELECTOR PLAN 1
Post-operative abdominal pain after lap patrick 12/3/18 with Dr. Pritchett. Pt has worsening leukocytosis and pain today. Rapid response was called for tachypnea, likely due to abdominal pain. Vital signs stable during rapid.    Surgery recs - cont IVF @ 125, gave 500 cc bolus this AM, started zosyn, f/u urgent CT A/P w/ PO/IV contrast  Pain management  NPO/IVF  Incentive spirometer  OOB/ambulate  Daily labs

## 2018-12-05 NOTE — PROGRESS NOTE ADULT - SUBJECTIVE AND OBJECTIVE BOX
38M w/ PMH of CHRISTINE, chronic cholecystitis/cholelithiasis s/p laparascopic cholecystostomy tube placement by Dr. Pritchett in August 2018 (Malencot drain placed in gallbladder and REMI drain left in liver/sidewall interface) and now s/p laparoscopic cholecystectomy 12/3/18 w/ Dr. Pritchett at [facility] found to have bile leak transferred to [facility] for drainage of biloma.     NPO status:     Anticoagulation: None.     Antibiotics: Zosyn IV due at 2200.     Allergies:adhesives (Rash)  cortisone (Rash)      PAST MEDICAL & SURGICAL HISTORY:  Prediabetes  GERD (gastroesophageal reflux disease): no medications  Scoliosis  CHRISTINE (obstructive sleep apnea): no sleep studies done but had witnessed apnea by his brother  Calculus of gallbladder with cholecystitis without biliary obstruction, unspecified cholecystitis acuity  S/P laparoscopic cholecystectomy: 12/03/18  H/O abdominal surgery: insertion of biliary drain 2018        Pertinent labs:                      15.3   24.9  )-----------( 307      ( 05 Dec 2018 16:11 )             44.2   12-05    133<L>  |  95<L>  |  10  ----------------------------<  131<H>  4.3   |  24  |  0.47<L>    Ca    8.9      05 Dec 2018 16:11    TPro  7.7  /  Alb  2.7<L>  /  TBili  1.2  /  DBili  x   /  AST  18  /  ALT  40  /  AlkPhos  95  12-05  PT/INR - ( 05 Dec 2018 16:11 )   PT: 16.9 sec;   INR: 1.47 ratio         PTT - ( 05 Dec 2018 16:11 )  PTT:29.4 sec    Consent: Procedure/risks/ Benefits explained. Informed consent obtained. Pt verbalizes understanding. 38M w/ PMH of CHRISTINE, chronic cholecystitis/cholelithiasis s/p laparascopic cholecystostomy tube placement by Dr. Pritchett in August 2018 (Malencot drain placed in gallbladder and REMI drain left in liver/sidewall interface) and now s/p laparoscopic cholecystectomy 12/3/18 w/ Dr. Pritchett at [facility] found to have bile leak transferred to [facility] for drainage of biloma.     NPO status: oral contrast at 10:00AM.    Anticoagulation: None.     Antibiotics: Zosyn IV due at 2200.     Allergies:adhesives (Rash)  cortisone (Rash)      PAST MEDICAL & SURGICAL HISTORY:  Prediabetes  GERD (gastroesophageal reflux disease): no medications  Scoliosis  CHRISTINE (obstructive sleep apnea): no sleep studies done but had witnessed apnea by his brother  Calculus of gallbladder with cholecystitis without biliary obstruction, unspecified cholecystitis acuity  S/P laparoscopic cholecystectomy: 12/03/18  H/O abdominal surgery: insertion of biliary drain 2018        Pertinent labs:                      15.3   24.9  )-----------( 307      ( 05 Dec 2018 16:11 )             44.2   12-05    133<L>  |  95<L>  |  10  ----------------------------<  131<H>  4.3   |  24  |  0.47<L>    Ca    8.9      05 Dec 2018 16:11    TPro  7.7  /  Alb  2.7<L>  /  TBili  1.2  /  DBili  x   /  AST  18  /  ALT  40  /  AlkPhos  95  12-05  PT/INR - ( 05 Dec 2018 16:11 )   PT: 16.9 sec;   INR: 1.47 ratio         PTT - ( 05 Dec 2018 16:11 )  PTT:29.4 sec    Consent: Procedure/risks/ Benefits explained. Informed consent obtained. Pt verbalizes understanding.

## 2018-12-05 NOTE — DISCHARGE NOTE ADULT - CARE PLAN
Principal Discharge DX:	Generalized abdominal pain  Goal:	Resolution of pain  Assessment and plan of treatment:	S/p lap patrick 12/3/18 by Dr. Pritchett at [facility] and DCed home say day. Returned <24 hours later to [facility] for worsening abdominal pain, found to have likely bile leak.  Continue IV abx, NPO, IVF  Transfer to [facility] w/ Dr. Stanley Chen (surgery), pt will need IR intervention to drain bile.  Secondary Diagnosis:	Prediabetes  Assessment and plan of treatment:	When can tolerate PO, start consistent carbohydrate diet  Secondary Diagnosis:	CHRISTINE (obstructive sleep apnea)  Secondary Diagnosis:	Gastroesophageal reflux disease, esophagitis presence not specified Principal Discharge DX:	Generalized abdominal pain  Goal:	Resolution of pain  Assessment and plan of treatment:	S/p lap patrick 12/3/18 by Dr. Pritchett at [facility] and DCed home say day. Returned <24 hours later to [facility] for worsening abdominal pain, found to have likely bile leak.  Continue IV abx, NPO, IVF  Transfer to [facility] w/ Dr. Stanley Chen (surgery), pt will need IR intervention to drain bile.  Pt had rapid response today 12/5/18 for tachypnea- PLEASE FOLLOW UP TROPONIN LEVEL ON TRANSFER. LOW SUSPICION FOR CARDIAC CAUSE OF TACHYPNEA HOWEVER GIVEN THAT THERE WAS MENTION OF CHEST PAIN WITH TACHYPNEA IT WAS ORDERED AND SENT, NOT YET RESULTED AT TIME OF TRANSFER.  Secondary Diagnosis:	Prediabetes  Assessment and plan of treatment:	When can tolerate PO, start consistent carbohydrate diet  Secondary Diagnosis:	CHRISTINE (obstructive sleep apnea)  Secondary Diagnosis:	Gastroesophageal reflux disease, esophagitis presence not specified Principal Discharge DX:	Generalized abdominal pain  Goal:	Resolution of pain  Assessment and plan of treatment:	S/p lap patrick 12/3/18 by Dr. Pritchett at [facility] and DCed home say day. Returned <24 hours later to [facility] for worsening abdominal pain, found to have likely bile leak.  Continue IV abx, NPO, IVF  Transfer to [facility] w/ Dr. Stanley Chen (surgery), pt will need IR intervention to drain bile.  Pt had rapid response today 12/5/18 for tachypnea- PLEASE DRAW REPEAT TROPONIN LEVEL ON TRANSFER. LOW SUSPICION FOR CARDIAC CAUSE OF TACHYPNEA HOWEVER GIVEN THAT THERE WAS MENTION OF CHEST PAIN WITH TACHYPNEA IT WAS ORDERED AND SENT. Resulted as troponin = <0.017 at 11:25 AM.  Secondary Diagnosis:	Prediabetes  Assessment and plan of treatment:	When can tolerate PO, start consistent carbohydrate diet  Secondary Diagnosis:	CHRISTINE (obstructive sleep apnea)  Secondary Diagnosis:	Gastroesophageal reflux disease, esophagitis presence not specified Principal Discharge DX:	Generalized abdominal pain  Goal:	Resolution of pain  Assessment and plan of treatment:	S/p lap patrick 12/3/18 by Dr. Pritchett at [facility] and DCed home say day. Returned <24 hours later to [facility] for worsening abdominal pain, found to have likely bile leak.  Continue IV abx, NPO, IVF  Transfer to [facility] w/ Dr. Stanley Chen (surgery), pt will need IR intervention to drain bile.  Pt had rapid response today 12/5/18 for tachypnea- PLEASE DRAW REPEAT TROPONIN LEVEL ON TRANSFER. LOW SUSPICION FOR CARDIAC CAUSE OF TACHYPNEA HOWEVER GIVEN THAT THERE WAS MENTION OF CHEST PAIN WITH TACHYPNEA IT WAS ORDERED AND SENT. Resulted as troponin = <0.017 at 11:25 AM.  Secondary Diagnosis:	Prediabetes  Assessment and plan of treatment:	When can tolerate PO, start consistent carbohydrate diet  Secondary Diagnosis:	CHRISTINE (obstructive sleep apnea)  Secondary Diagnosis:	Gastroesophageal reflux disease, esophagitis presence not specified  Secondary Diagnosis:	Sepsis, due to unspecified organism  Assessment and plan of treatment:	On 5 Dec 18 patient was noted to have Fever to 102, Heart rate of 105and Leukocytosis  - WBC:16.7, consistent with a diagnosis of sepsis.

## 2018-12-05 NOTE — PROGRESS NOTE ADULT - SUBJECTIVE AND OBJECTIVE BOX
pod#2  s/p laparoscopic cholecystectomy      followup:    patient feeling better now, looks better  received fluids, antibioitics and pain meds  bp stable    abdomen-less tender    ct scan shows fluid collection above liver and in subhepatic space c/w bile according to Dr. Otero    no evidence of extravasation of contrast from bowel

## 2018-12-05 NOTE — PROGRESS NOTE ADULT - ASSESSMENT
a/p  rule bile leak(also have to consider bowel injury even if unlikely)  npo  iv bolus  start zosyn  ct scan abd/pelvis with iv and po contrast.    I asked intensivist Dr. Ortez to evaluate patient. I explained to patient and father via (dr. jean baptiste [facility]) that I have to consider every differntial and the ct scan will start to help us. May also need HIDA scan depending on results of CT scan.    At this time, I do not think he demonstrates 4 quadrant peritoneal signs and/or is sick enough to indicate urgent OR exploration.

## 2018-12-05 NOTE — DISCHARGE NOTE ADULT - CARE PROVIDER_API CALL
Jocy Eckert), Family Medicine  [address] [place] [address]  Phone: [phone]  Fax: [phone]    Santino Pritchett), Surgery  [address] [place] [address]  Phone: [phone]  Fax: [phone]

## 2018-12-05 NOTE — CONSULT NOTE ADULT - ASSESSMENT
RRT called while on tele unit for sob secondary to pain and splinting after re-admission to hospital 12/4 (POD #1) for fever / NV.  Now POD #2 s/p lap choley for chronic choley with hx of enterobacter bacteremia s/p abx treatment course and prior choleycystostomy tube.  Pt. on zosyn awaiting CT abdomen to r/o abscess.  May need RTOR. Initial attempted choley 8/13/18 aborted secondary to adherence of local organs/inflammatory response, cholecystostomy tube placed.   completed   course of meropenem which was switched to cipro/flagyl for discharge.    RRT called while on tele unit for sob secondary to abdominal pain and splinting after re-admission to hospital 12/4 (POD #1) for fever / NV.  Now POD #2 s/p lap choley for chronic choley with hx of enterobacter bacteremia s/p abx treatment course and prior choleycystostomy tube.  Pt. on zosyn awaiting CT abdomen to r/o abscess.  May need RTOR. Initial attempted choley 8/13/18 aborted secondary to adherence of local organs/inflammatory response, cholecystostomy tube placed.   completed   course of meropenem which was switched to cipro/flagyl for discharge.    RRT called while on tele unit for sob secondary to abdominal pain and splinting after re-admission to hospital 12/4 (POD #1) for fever / NV.  Now POD #2 s/p lap choley for chronic choley with hx of enterobacter bacteremia s/p abx treatment course and prior choleycystostomy tube.  Pt. on zosyn awaiting CT abdomen to r/o abscess.  May need RTOR.    prelim CT abd. result shows bile leak.  pt. to be transferred to [facility] for IR intervention to control leak by surgery service. Assessment  1. Perihepatic bile leak  2. s/p Cholecystectomy 12/4  3. Dyspnea due to RUQ pain and referred pain to Right shoulder pain         Plan  IR drainage  IVF  IV abx  Transfer to [facility]  case d/w Dr. Pritchett  >60 min spent in eval and management of this patient. and help to coordinate care. Assessment  1. Perihepatic bile leak  2. s/p Cholecystectomy 12/4  3. Dyspnea due to RUQ pain and referred pain to Right shoulder pain   4. Patient meets sepsis criteria, Level of sepsis based on lactic acid level currently pending.      Plan  IR drainage  IVF  IV abx  Transfer to [facility]  case d/w Dr. Pritchett  >60 min spent in eval and management of this patient. and help to coordinate care.

## 2018-12-05 NOTE — CHART NOTE - NSCHARTNOTEFT_GEN_A_CORE
I had long discussion with patients parents and patient via . I went through the entire history of the patient including the initial need for the cholecystostomy tube in August(unsafe ruq due to no identifiable anatomy other than 1 cm of the fundus of gallbladder).  I also went through the operative findings from 12/03/2018 and how much inflammation there was but that we were able to safely identify the anatomy(critical view of safety, infundibulum/cystic duct jct, cystic/artery gall bladder jct).  I gave them the results of todays CT scan and also my three theories for bile leak including1; Ducts of Lushka 2: bile duct injury(less likely) and 3: slipped clips due to inflammation.    I told them that Dr. Otero arranged for IR today at [facility].  I also told them that Attending Surgeon Dr. Stanley Chen will be attending of record.  I also told them, depending on IR drainage ETC, he may need gastroenterologist evaluation and possible ERCP.    Finally, I told them I will always be available for the patient when he gets discharged from [facility].

## 2018-12-05 NOTE — DISCHARGE NOTE ADULT - HOSPITAL COURSE
38M w/ PMH of CHRISTINE, chronic cholecystitis/cholelithiasis is s/p laparoscopic cholecystectomy 12/3/18 w/ Dr. Pritchett at [facility]. Pt tolerated procedure well and was DCed home same day, however returned to hospital later that day for worsening abdominal pain. During hospitalization pt found to have leukocytosis (up to ~20 today), tachycardia, and tachypnea with low grade temperature (~100F). Pain was not well managed with pain medications. Today 12/5 patient changed from CLD to NPO, started on IVF and zosyn. CT A/P was performed that showed likely bile leak. Dr. Pritchett aware and transferring patient to [facility] under Dr. Stanley Chen's service (surgery) for IR procedure - drainage of bile. Pt and family aware and agree to plan. Pt is safe for transfer to [facility] with IVF and zosyn.    Please have patient follow up with Dr. Pritchett (surgery) and Dr. Quiñonez (PCP - family practice). 38M w/ PMH of CHRISTINE, chronic cholecystitis/cholelithiasis is s/p laparoscopic cholecystectomy 12/3/18 w/ Dr. Pritchett at [facility]. Pt tolerated procedure well and was DCed home same day, however returned to hospital later that day for worsening abdominal pain. During hospitalization pt found to have leukocytosis (up to ~20 today), tachycardia, and tachypnea with low grade temperature (~100F). Pain was not well managed with pain medications. Today 12/5 patient changed from CLD to NPO, started on IVF and zosyn. CT A/P was performed that showed likely bile leak. Dr. Pritchett aware and transferring patient to [facility] under Dr. Stanley Chen's service (surgery) for IR procedure - drainage of bile. Pt and family aware and agree to plan. Pt is safe for transfer to [facility] with IVF and zosyn.    Hospital course complicated by rapid response called today 12/5/18 for tachypnea. Details in previous notes, but will give summary here. Pt has been tachypneic due to worsening abdominal pain. Vitals were stable during rapid response (SBP in 170s - elevated due to pain, saturating in high 90s on room air, tachycardic to 110s - due to pain, and afebrile). Patient was already receiving IVF and IV abx at this time as there was high suspicion for intra-abdominal pathology. Pt was getting read for CT A/P at time of rapid response. There was mention of chest pain in addition to tachypnea so troponin level was ordered (low suspicion for cardiac cause of SOB given lack of risk factors, patient's age, and much higher likelihood of intra-abdominal pathology). At time of agreement to transfer the troponin level was sent but not yet resulted.    Please have patient follow up with Dr. Pritchett (surgery) and Dr. Quiñonez (PCP - family practice). 38M w/ PMH of CHRISTINE, chronic cholecystitis/cholelithiasis is s/p laparoscopic cholecystectomy 12/3/18 w/ Dr. Pritchett at [facility]. Pt tolerated procedure well and was DCed home same day, however returned to hospital later that day for worsening abdominal pain. During hospitalization pt found to have leukocytosis (up to ~20 today), tachycardia, and tachypnea with low grade temperature (~100F). Pain was not well managed with pain medications. Today 12/5 patient changed from CLD to NPO, started on IVF and zosyn. CT A/P was performed that showed likely bile leak. Dr. Pritchett aware and transferring patient to [facility] under Dr. Stanley Chen's service (surgery) for IR procedure - drainage of bile. Pt and family aware and agree to plan. Pt is safe for transfer to [facility] with IVF and zosyn.    Hospital course complicated by rapid response called today 12/5/18 for tachypnea. Details in previous notes, but will give summary here. Pt has been tachypneic due to worsening abdominal pain. Vitals were stable during rapid response (SBP in 170s - elevated due to pain, saturating in high 90s on room air, tachycardic to 110s - due to pain, and afebrile). Patient was already receiving IVF and IV abx at this time as there was high suspicion for intra-abdominal pathology. Pt was getting read for CT A/P at time of rapid response. There was mention of chest pain in addition to tachypnea so troponin level was ordered (low suspicion for cardiac cause of SOB given lack of risk factors, patient's age, and much higher likelihood of intra-abdominal pathology). At time of agreement to transfer the troponin level resulted as <0.017 (negative).    Please have patient follow up with Dr. Pritchett (surgery) and Dr. Quiñonez (PCP - family practice).

## 2018-12-05 NOTE — DISCHARGE NOTE ADULT - ADDITIONAL INSTRUCTIONS
Transfer to [facility]. On discharge please follow up with PCP Dr. Quiñonez at [facility].

## 2018-12-05 NOTE — CONSULT NOTE ADULT - SUBJECTIVE AND OBJECTIVE BOX
ICU CONSULT    Initial HPI on admission:  HPI:  39 yo male with obesity, ?CHRISTINE, hx of chronic cholecystitis and cholelithiasis, s/p  lap patrick yesterday, was okay until late last night, had severe abdominal pain, took a Percocet at 9PM, them 1 AM, without relief.  Pain worsened until this early morning, feeling bloated, nauseous, and pt felt feverish so decided to go to the ED.  Temp was 99.9.  Abd xray was neg.    Pt had hx of chronic cholecystitis, with cholelithiasis x about 3 years, until in 08/13/2018, he had a severe bout of acute cholecystitis, and had enterobacter bacteremia, tx with IV antibx.  He went to the OR on 08/13/18 to have cholecystectomy but gallbladder was too inflamed and adherent so had a cholecystostomy tube placed at that time.  Pt had improved thereafter and had lap patrick performed yesterday afternoon. (04 Dec 2018 03:13)      BRIEF HOSPITAL COURSE: ***    PAST MEDICAL & SURGICAL HISTORY:  Prediabetes  GERD (gastroesophageal reflux disease): no medications  Scoliosis  CHRISTINE (obstructive sleep apnea): no sleep studies done but had witnessed apnea by his brother  Calculus of gallbladder with cholecystitis without biliary obstruction, unspecified cholecystitis acuity  S/P laparoscopic cholecystectomy: 12/03/18  H/O abdominal surgery: insertion of biliary drain 2018    Allergies    adhesives (Rash)  cortisone (Rash)    Intolerances      FAMILY HISTORY:  Family history of kidney stone (Sibling)  Family history of peripheral vascular disease  Family history of thyroid disease  Family history of cholelithiasis  Family history of Parkinson disease  Family history of diabetes mellitus    Social history:     Review of Systems:  CONSTITUTIONAL: No fever, chills, or fatigue  EYES: No eye pain, visual disturbances, or discharge  ENMT:  No difficulty hearing, tinnitus, vertigo; No sinus or throat pain  NECK: No pain or stiffness  RESPIRATORY: Per above  CARDIOVASCULAR: No chest pain, palpitations, dizziness, or leg swelling  GASTROINTESTINAL: No abdominal or epigastric pain. No nausea, vomiting, or hematemesis; No diarrhea or constipation. No melena or hematochezia.  GENITOURINARY: No dysuria, frequency, hematuria, or incontinence  NEUROLOGICAL: No headaches, memory loss, loss of strength, numbness, or tremors  SKIN: No itching, burning, rashes, or lesions   MUSCULOSKELETAL: No joint pain or swelling; No muscle, back, or extremity pain  PSYCHIATRIC: No depression, anxiety, mood swings, or difficulty sleeping      Medications:  MEDICATIONS  (STANDING):  HYDROmorphone  Injectable 0.5 milliGRAM(s) IV Push once  lactated ringers Bolus 500 milliLiter(s) IV Bolus once  lactated ringers. 1000 milliLiter(s) (125 mL/Hr) IV Continuous <Continuous>  piperacillin/tazobactam IVPB. 3.375 Gram(s) IV Intermittent every 8 hours  polyethylene glycol 3350 17 Gram(s) Oral daily    MEDICATIONS  (PRN):  acetaminophen   Tablet .. 650 milliGRAM(s) Oral every 6 hours PRN Temp greater or equal to 38C (100.4F), Mild Pain (1 - 3)  magnesium hydroxide Suspension 30 milliLiter(s) Oral once PRN Constipation  morphine  - Injectable 4 milliGRAM(s) IV Push every 3 hours PRN Severe Pain (7 - 10)  ondansetron Injectable 4 milliGRAM(s) IV Push every 6 hours PRN Nausea and/or Vomiting  oxyCODONE    5 mG/acetaminophen 325 mG 1 Tablet(s) Oral every 4 hours PRN Moderate Pain (4 - 6)            Vital Signs Last 24 Hrs  T(C): 38.9 (05 Dec 2018 09:24), Max: 38.9 (05 Dec 2018 09:24)  T(F): 102 (05 Dec 2018 09:24), Max: 102 (05 Dec 2018 09:24)  HR: 105 (05 Dec 2018 08:03) (80 - 105)  BP: 148/94 (05 Dec 2018 08:03) (141/86 - 157/89)  BP(mean): --  RR: 16 (05 Dec 2018 08:03) (15 - 16)  SpO2: 96% (05 Dec 2018 08:03) (94% - 98%)            12-04 @ 07:01  -  12-05 @ 07:00  --------------------------------------------------------  IN: 360 mL / OUT: 1500 mL / NET: -1140 mL          LABS:                        14.4   16.7  )-----------( 313      ( 05 Dec 2018 06:05 )             43.5     12-05    132<L>  |  98  |  9   ----------------------------<  139<H>  4.1   |  24  |  0.60    Ca    8.8      05 Dec 2018 06:05    TPro  8.3  /  Alb  3.3  /  TBili  0.2  /  DBili  x   /  AST  21  /  ALT  47<H>  /  AlkPhos  106  12-04          CAPILLARY BLOOD GLUCOSE      POCT Blood Glucose.: 174 mg/dL (05 Dec 2018 09:38)                      CULTURES: (if applicable)                    Physical Examination:    General: mild  distress.      HEENT: Pupils equal, reactive to light.  Symmetric.    PULM: Clear to auscultation bilaterally, no significant sputum production    CVS: S1, S2    ABD: Splinting, trochar sites CDI, tender boggy RUQ, no bowel sounds    EXT: No edema, nontender    SKIN: Warm and well perfused, no rashes noted.    NEURO: Alert, oriented, interactive, nonfocal    RADIOLOGY REVIEWED  CXR:    CT abd. pending now    TTE: ICU CONSULT    Initial HPI on admission:  HPI:  37 yo male with obesity, ?CHRISTNIE, hx of chronic cholecystitis and cholelithiasis, s/p  lap patrick yesterday, was okay until late last night, had severe abdominal pain, took a Percocet at 9PM, them 1 AM, without relief.  Pain worsened until this early morning, feeling bloated, nauseous, and pt felt feverish so decided to go to the ED.  Temp was 99.9.  Abd xray was neg.    Pt had hx of chronic cholecystitis, with cholelithiasis x about 3 years, until in 08/13/2018, he had a severe bout of acute cholecystitis, and had enterobacter bacteremia, tx with IV antibx.  He went to the OR on 08/13/18 to have cholecystectomy but gallbladder was too inflamed and adherent so had a cholecystostomy tube placed at that time.  Pt had improved thereafter and had lap patrick performed yesterday afternoon. (04 Dec 2018 03:13)      BRIEF HOSPITAL COURSE:  today noted to have severe abd pain and sob, with fever  ICU consult called for dyspnea with out resp distress.     pt seen and examined with Dr. Pritchett bedside.     PAST MEDICAL & SURGICAL HISTORY:  Prediabetes  GERD (gastroesophageal reflux disease): no medications  Scoliosis  CHRISTINE (obstructive sleep apnea): no sleep studies done but had witnessed apnea by his brother  Calculus of gallbladder with cholecystitis without biliary obstruction, unspecified cholecystitis acuity  S/P laparoscopic cholecystectomy: 12/03/18  H/O abdominal surgery: insertion of biliary drain 2018    Allergies    adhesives (Rash)  cortisone (Rash)    Intolerances      FAMILY HISTORY:  Family history of kidney stone (Sibling)  Family history of peripheral vascular disease  Family history of thyroid disease  Family history of cholelithiasis  Family history of Parkinson disease  Family history of diabetes mellitus    Social history:     Review of Systems:  CONSTITUTIONAL: No fever, chills, or fatigue  EYES: No eye pain, visual disturbances, or discharge  ENMT:  No difficulty hearing, tinnitus, vertigo; No sinus or throat pain  NECK: No pain or stiffness  RESPIRATORY: Per above  CARDIOVASCULAR: No chest pain, palpitations, dizziness, or leg swelling  GASTROINTESTINAL: No abdominal or epigastric pain. No nausea, vomiting, or hematemesis; No diarrhea or constipation. No melena or hematochezia.  GENITOURINARY: No dysuria, frequency, hematuria, or incontinence  NEUROLOGICAL: No headaches, memory loss, loss of strength, numbness, or tremors  SKIN: No itching, burning, rashes, or lesions   MUSCULOSKELETAL: No joint pain or swelling; No muscle, back, or extremity pain  PSYCHIATRIC: No depression, anxiety, mood swings, or difficulty sleeping      Medications:  MEDICATIONS  (STANDING):  HYDROmorphone  Injectable 0.5 milliGRAM(s) IV Push once  lactated ringers Bolus 500 milliLiter(s) IV Bolus once  lactated ringers. 1000 milliLiter(s) (125 mL/Hr) IV Continuous <Continuous>  piperacillin/tazobactam IVPB. 3.375 Gram(s) IV Intermittent every 8 hours  polyethylene glycol 3350 17 Gram(s) Oral daily    MEDICATIONS  (PRN):  acetaminophen   Tablet .. 650 milliGRAM(s) Oral every 6 hours PRN Temp greater or equal to 38C (100.4F), Mild Pain (1 - 3)  magnesium hydroxide Suspension 30 milliLiter(s) Oral once PRN Constipation  morphine  - Injectable 4 milliGRAM(s) IV Push every 3 hours PRN Severe Pain (7 - 10)  ondansetron Injectable 4 milliGRAM(s) IV Push every 6 hours PRN Nausea and/or Vomiting  oxyCODONE    5 mG/acetaminophen 325 mG 1 Tablet(s) Oral every 4 hours PRN Moderate Pain (4 - 6)            Vital Signs Last 24 Hrs  T(C): 38.9 (05 Dec 2018 09:24), Max: 38.9 (05 Dec 2018 09:24)  T(F): 102 (05 Dec 2018 09:24), Max: 102 (05 Dec 2018 09:24)  HR: 105 (05 Dec 2018 08:03) (80 - 105)  BP: 148/94 (05 Dec 2018 08:03) (141/86 - 157/89)  BP(mean): --  RR: 16 (05 Dec 2018 08:03) (15 - 16)  SpO2: 96% (05 Dec 2018 08:03) (94% - 98%)            12-04 @ 07:01  -  12-05 @ 07:00  --------------------------------------------------------  IN: 360 mL / OUT: 1500 mL / NET: -1140 mL          LABS:                        14.4   16.7  )-----------( 313      ( 05 Dec 2018 06:05 )             43.5     12-05    132<L>  |  98  |  9   ----------------------------<  139<H>  4.1   |  24  |  0.60    Ca    8.8      05 Dec 2018 06:05    TPro  8.3  /  Alb  3.3  /  TBili  0.2  /  DBili  x   /  AST  21  /  ALT  47<H>  /  AlkPhos  106  12-04          CAPILLARY BLOOD GLUCOSE      POCT Blood Glucose.: 174 mg/dL (05 Dec 2018 09:38)                      CULTURES: (if applicable)                    Physical Examination:    General: mild  distress.      HEENT: Pupils equal, reactive to light.  Symmetric.    PULM: Clear to auscultation bilaterally, no significant sputum production    CVS: S1, S2    ABD: Splinting, trochar sites CDI, tender boggy RUQ, no bowel sounds    EXT: No edema, nontender    SKIN: Warm and well perfused, no rashes noted.    NEURO: Alert, oriented, interactive, nonfocal    RADIOLOGY REVIEWED  CXR:    CT abd. pending now    TTE: ICU CONSULT    Initial HPI on admission:  HPI:  39 yo male with obesity, ?CHRISTINE, hx of chronic cholecystitis and cholelithiasis, s/p  lap patrick yesterday, was okay until late last night, had severe abdominal pain, took a Percocet at 9PM, them 1 AM, without relief.  Pain worsened until this early morning, feeling bloated, nauseous, and pt felt feverish so decided to go to the ED.  Temp was 99.9.  Abd xray was neg.    Pt had hx of chronic cholecystitis, with cholelithiasis x about 3 years, until in 08/13/2018, he had a severe bout of acute cholecystitis, and had enterobacter bacteremia, tx with IV antibx.  He went to the OR on 08/13/18 to have cholecystectomy but gallbladder was too inflamed and adherent so had a cholecystostomy tube placed at that time.  Pt had improved thereafter and had lap patrick performed 12/4 afternoon. (04 Dec 2018 03:13)      BRIEF HOSPITAL COURSE:  today noted to have severe abd pain and sob, with fever  ICU consult called for dyspnea with out resp distress.     pt seen and examined with Dr. Pritchett bedside.   Exam noted RUQ pain, absent bowel sounds. pt not in resp distress  received Dilaudid with short relief  RRT called few min later for continued SOB, no acute hemodynamic instablity or hypoxia noted.    Pt taken to CT showed para hepatic fluid collection.     Case reveiewed with dr. pritchett, Dr Otero and IR  plan for patient to be transferred to [facility] for further management     PAST MEDICAL & SURGICAL HISTORY:  Prediabetes  GERD (gastroesophageal reflux disease): no medications  Scoliosis  CHRISTINE (obstructive sleep apnea): no sleep studies done but had witnessed apnea by his brother  Calculus of gallbladder with cholecystitis without biliary obstruction, unspecified cholecystitis acuity  S/P laparoscopic cholecystectomy: 12/03/18  H/O abdominal surgery: insertion of biliary drain 2018    Allergies    adhesives (Rash)  cortisone (Rash)    Intolerances      FAMILY HISTORY:  Family history of kidney stone (Sibling)  Family history of peripheral vascular disease  Family history of thyroid disease  Family history of cholelithiasis  Family history of Parkinson disease  Family history of diabetes mellitus    Social history:     Review of Systems:  CONSTITUTIONAL: +fever, chills, fatigue  EYES: No eye pain, visual disturbances, or discharge  ENMT:  No difficulty hearing, tinnitus, vertigo; No sinus or throat pain  NECK: No pain or stiffness  RESPIRATORY: Per above  CARDIOVASCULAR: No chest pain, palpitations, dizziness, or leg swelling  GASTROINTESTINAL: No abdominal or epigastric pain. No nausea, vomiting, or hematemesis; No diarrhea or constipation. No melena or hematochezia.  GENITOURINARY: No dysuria, frequency, hematuria, or incontinence  NEUROLOGICAL: No headaches, memory loss, loss of strength, numbness, or tremors  SKIN: No itching, burning, rashes, or lesions   MUSCULOSKELETAL: No joint pain or swelling; No muscle, back, or extremity pain  PSYCHIATRIC: No depression, anxiety, mood swings, or difficulty sleeping      Medications:  MEDICATIONS  (STANDING):  HYDROmorphone  Injectable 0.5 milliGRAM(s) IV Push once  lactated ringers Bolus 500 milliLiter(s) IV Bolus once  lactated ringers. 1000 milliLiter(s) (125 mL/Hr) IV Continuous <Continuous>  piperacillin/tazobactam IVPB. 3.375 Gram(s) IV Intermittent every 8 hours  polyethylene glycol 3350 17 Gram(s) Oral daily    MEDICATIONS  (PRN):  acetaminophen   Tablet .. 650 milliGRAM(s) Oral every 6 hours PRN Temp greater or equal to 38C (100.4F), Mild Pain (1 - 3)  magnesium hydroxide Suspension 30 milliLiter(s) Oral once PRN Constipation  morphine  - Injectable 4 milliGRAM(s) IV Push every 3 hours PRN Severe Pain (7 - 10)  ondansetron Injectable 4 milliGRAM(s) IV Push every 6 hours PRN Nausea and/or Vomiting  oxyCODONE    5 mG/acetaminophen 325 mG 1 Tablet(s) Oral every 4 hours PRN Moderate Pain (4 - 6)            Vital Signs Last 24 Hrs  T(C): 38.9 (05 Dec 2018 09:24), Max: 38.9 (05 Dec 2018 09:24)  T(F): 102 (05 Dec 2018 09:24), Max: 102 (05 Dec 2018 09:24)  HR: 105 (05 Dec 2018 08:03) (80 - 105)  BP: 148/94 (05 Dec 2018 08:03) (141/86 - 157/89)  BP(mean): --  RR: 16 (05 Dec 2018 08:03) (15 - 16)  SpO2: 96% (05 Dec 2018 08:03) (94% - 98%)            12-04 @ 07:01  -  12-05 @ 07:00  --------------------------------------------------------  IN: 360 mL / OUT: 1500 mL / NET: -1140 mL          LABS:                        14.4   16.7  )-----------( 313      ( 05 Dec 2018 06:05 )             43.5     12-05    132<L>  |  98  |  9   ----------------------------<  139<H>  4.1   |  24  |  0.60    Ca    8.8      05 Dec 2018 06:05    TPro  8.3  /  Alb  3.3  /  TBili  0.2  /  DBili  x   /  AST  21  /  ALT  47<H>  /  AlkPhos  106  12-04          CAPILLARY BLOOD GLUCOSE      POCT Blood Glucose.: 174 mg/dL (05 Dec 2018 09:38)                      CULTURES: (if applicable)                    Physical Examination:    General: mild  distress.      HEENT: Pupils equal, reactive to light.  Symmetric.    PULM: Clear to auscultation bilaterally, no significant sputum production    CVS: S1, S2    ABD: Splinting, trochar sites CDI, tender boggy RUQ, no bowel sounds    EXT: No edema, non  tender    SKIN: Warm and well perfused, no rashes noted.    NEURO: Alert, oriented, interactive, nonfocal    RADIOLOGY REVIEWED  CXR:  < from: Xray Chest 1 View-PORTABLE IMMEDIATE (12.05.18 @ 08:46) >  IMPRESSION: Evaluation limited secondary to shallow inspiration. Mild   elevation right hemidiaphragm. Right hilar nodular prominence may be   related to shallow inspiration and portable technique; follow-up PA and   lateral radiography of the chest recommended. There is no evidence for   focal infiltrate, lobar consolidation or pulmonary edema.     < end of copied text >  CT abd.     < from: CT Abdomen and Pelvis w/ Oral Cont and w/ IV Cont (12.05.18 @ 10:15) >  IMPRESSION:  There is a large subcapsular homogeneous in attenuation   perihepatic fluid collection identified. Fluid and air is identified   within the gallbladder fossaadjacent to surgical clips. Fluid is also   identified tracking along the right paracolic gutter with free fluid   identified within the deep pelvis. Findings suspicious for bile leak in   this patient status post laparoscopic cholecystectomy. Segmental wall   thickening of the colon is identified adjacent to the gallbladder fossa.   Stranding of the fat within the gallbladder fossa region noted.     Results discussed with Dr. Pritchett.      < end of copied text >      TTE:

## 2018-12-06 DIAGNOSIS — K91.89 OTHER POSTPROCEDURAL COMPLICATIONS AND DISORDERS OF DIGESTIVE SYSTEM: ICD-10-CM

## 2018-12-06 LAB
ALBUMIN SERPL ELPH-MCNC: 3 G/DL — LOW (ref 3.3–5)
ALBUMIN SERPL ELPH-MCNC: 3.2 G/DL — LOW (ref 3.3–5)
ALP SERPL-CCNC: 107 U/L — SIGNIFICANT CHANGE UP (ref 40–120)
ALP SERPL-CCNC: 115 U/L — SIGNIFICANT CHANGE UP (ref 40–120)
ALT FLD-CCNC: 59 U/L — HIGH (ref 10–45)
ALT FLD-CCNC: 60 U/L — HIGH (ref 10–45)
ANION GAP SERPL CALC-SCNC: 12 MMOL/L — SIGNIFICANT CHANGE UP (ref 5–17)
ANION GAP SERPL CALC-SCNC: 12 MMOL/L — SIGNIFICANT CHANGE UP (ref 5–17)
ANION GAP SERPL CALC-SCNC: 15 MMOL/L — SIGNIFICANT CHANGE UP (ref 5–17)
APPEARANCE UR: CLEAR — SIGNIFICANT CHANGE UP
AST SERPL-CCNC: 30 U/L — SIGNIFICANT CHANGE UP (ref 10–40)
AST SERPL-CCNC: 37 U/L — SIGNIFICANT CHANGE UP (ref 10–40)
BILIRUB DIRECT SERPL-MCNC: 0.5 MG/DL — HIGH (ref 0–0.2)
BILIRUB DIRECT SERPL-MCNC: 1.4 MG/DL — HIGH (ref 0–0.2)
BILIRUB INDIRECT FLD-MCNC: 0.5 MG/DL — SIGNIFICANT CHANGE UP (ref 0.2–1)
BILIRUB INDIRECT FLD-MCNC: 0.9 MG/DL — SIGNIFICANT CHANGE UP (ref 0.2–1)
BILIRUB SERPL-MCNC: 1 MG/DL — SIGNIFICANT CHANGE UP (ref 0.2–1.2)
BILIRUB SERPL-MCNC: 2.3 MG/DL — HIGH (ref 0.2–1.2)
BILIRUB UR-MCNC: ABNORMAL
BUN SERPL-MCNC: 13 MG/DL — SIGNIFICANT CHANGE UP (ref 7–23)
BUN SERPL-MCNC: 13 MG/DL — SIGNIFICANT CHANGE UP (ref 7–23)
BUN SERPL-MCNC: 21 MG/DL — SIGNIFICANT CHANGE UP (ref 7–23)
CALCIUM SERPL-MCNC: 8.6 MG/DL — SIGNIFICANT CHANGE UP (ref 8.4–10.5)
CALCIUM SERPL-MCNC: 8.6 MG/DL — SIGNIFICANT CHANGE UP (ref 8.4–10.5)
CALCIUM SERPL-MCNC: 9.2 MG/DL — SIGNIFICANT CHANGE UP (ref 8.4–10.5)
CHLORIDE SERPL-SCNC: 96 MMOL/L — SIGNIFICANT CHANGE UP (ref 96–108)
CHLORIDE SERPL-SCNC: 97 MMOL/L — SIGNIFICANT CHANGE UP (ref 96–108)
CHLORIDE SERPL-SCNC: 98 MMOL/L — SIGNIFICANT CHANGE UP (ref 96–108)
CO2 SERPL-SCNC: 22 MMOL/L — SIGNIFICANT CHANGE UP (ref 22–31)
CO2 SERPL-SCNC: 23 MMOL/L — SIGNIFICANT CHANGE UP (ref 22–31)
CO2 SERPL-SCNC: 24 MMOL/L — SIGNIFICANT CHANGE UP (ref 22–31)
COLOR SPEC: ABNORMAL
CREAT SERPL-MCNC: 0.47 MG/DL — LOW (ref 0.5–1.3)
CREAT SERPL-MCNC: 0.66 MG/DL — SIGNIFICANT CHANGE UP (ref 0.5–1.3)
CREAT SERPL-MCNC: 0.68 MG/DL — SIGNIFICANT CHANGE UP (ref 0.5–1.3)
DIFF PNL FLD: ABNORMAL
GLUCOSE SERPL-MCNC: 120 MG/DL — HIGH (ref 70–99)
GLUCOSE SERPL-MCNC: 122 MG/DL — HIGH (ref 70–99)
GLUCOSE SERPL-MCNC: 124 MG/DL — HIGH (ref 70–99)
GLUCOSE UR QL: ABNORMAL
GRAM STN FLD: SIGNIFICANT CHANGE UP
HCT VFR BLD CALC: 38.3 % — LOW (ref 39–50)
HCT VFR BLD CALC: 38.4 % — LOW (ref 39–50)
HCT VFR BLD CALC: 40.4 % — SIGNIFICANT CHANGE UP (ref 39–50)
HCT VFR BLD CALC: 42.2 % — SIGNIFICANT CHANGE UP (ref 39–50)
HCT VFR BLD CALC: 45.1 % — SIGNIFICANT CHANGE UP (ref 39–50)
HGB BLD-MCNC: 12.9 G/DL — LOW (ref 13–17)
HGB BLD-MCNC: 13.3 G/DL — SIGNIFICANT CHANGE UP (ref 13–17)
HGB BLD-MCNC: 14 G/DL — SIGNIFICANT CHANGE UP (ref 13–17)
HGB BLD-MCNC: 14 G/DL — SIGNIFICANT CHANGE UP (ref 13–17)
HGB BLD-MCNC: 15 G/DL — SIGNIFICANT CHANGE UP (ref 13–17)
KETONES UR-MCNC: NEGATIVE — SIGNIFICANT CHANGE UP
LEUKOCYTE ESTERASE UR-ACNC: NEGATIVE — SIGNIFICANT CHANGE UP
MAGNESIUM SERPL-MCNC: 2.1 MG/DL — SIGNIFICANT CHANGE UP (ref 1.6–2.6)
MAGNESIUM SERPL-MCNC: 2.4 MG/DL — SIGNIFICANT CHANGE UP (ref 1.6–2.6)
MCHC RBC-ENTMCNC: 27.3 PG — SIGNIFICANT CHANGE UP (ref 27–34)
MCHC RBC-ENTMCNC: 27.6 PG — SIGNIFICANT CHANGE UP (ref 27–34)
MCHC RBC-ENTMCNC: 27.9 PG — SIGNIFICANT CHANGE UP (ref 27–34)
MCHC RBC-ENTMCNC: 28.3 PG — SIGNIFICANT CHANGE UP (ref 27–34)
MCHC RBC-ENTMCNC: 28.4 PG — SIGNIFICANT CHANGE UP (ref 27–34)
MCHC RBC-ENTMCNC: 33.3 GM/DL — SIGNIFICANT CHANGE UP (ref 32–36)
MCHC RBC-ENTMCNC: 33.3 GM/DL — SIGNIFICANT CHANGE UP (ref 32–36)
MCHC RBC-ENTMCNC: 33.7 GM/DL — SIGNIFICANT CHANGE UP (ref 32–36)
MCHC RBC-ENTMCNC: 34.6 GM/DL — SIGNIFICANT CHANGE UP (ref 32–36)
MCHC RBC-ENTMCNC: 34.6 GM/DL — SIGNIFICANT CHANGE UP (ref 32–36)
MCV RBC AUTO: 82 FL — SIGNIFICANT CHANGE UP (ref 80–100)
MCV RBC AUTO: 82.2 FL — SIGNIFICANT CHANGE UP (ref 80–100)
MCV RBC AUTO: 82.2 FL — SIGNIFICANT CHANGE UP (ref 80–100)
MCV RBC AUTO: 82.6 FL — SIGNIFICANT CHANGE UP (ref 80–100)
MCV RBC AUTO: 83 FL — SIGNIFICANT CHANGE UP (ref 80–100)
NITRITE UR-MCNC: NEGATIVE — SIGNIFICANT CHANGE UP
PH UR: 6.5 — SIGNIFICANT CHANGE UP (ref 5–8)
PHOSPHATE SERPL-MCNC: 2 MG/DL — LOW (ref 2.5–4.5)
PHOSPHATE SERPL-MCNC: 2 MG/DL — LOW (ref 2.5–4.5)
PLATELET # BLD AUTO: 280 K/UL — SIGNIFICANT CHANGE UP (ref 150–400)
PLATELET # BLD AUTO: 286 K/UL — SIGNIFICANT CHANGE UP (ref 150–400)
PLATELET # BLD AUTO: 292 K/UL — SIGNIFICANT CHANGE UP (ref 150–400)
PLATELET # BLD AUTO: 310 K/UL — SIGNIFICANT CHANGE UP (ref 150–400)
PLATELET # BLD AUTO: 315 K/UL — SIGNIFICANT CHANGE UP (ref 150–400)
POTASSIUM SERPL-MCNC: 4.2 MMOL/L — SIGNIFICANT CHANGE UP (ref 3.5–5.3)
POTASSIUM SERPL-MCNC: 4.2 MMOL/L — SIGNIFICANT CHANGE UP (ref 3.5–5.3)
POTASSIUM SERPL-MCNC: 4.7 MMOL/L — SIGNIFICANT CHANGE UP (ref 3.5–5.3)
POTASSIUM SERPL-SCNC: 4.2 MMOL/L — SIGNIFICANT CHANGE UP (ref 3.5–5.3)
POTASSIUM SERPL-SCNC: 4.2 MMOL/L — SIGNIFICANT CHANGE UP (ref 3.5–5.3)
POTASSIUM SERPL-SCNC: 4.7 MMOL/L — SIGNIFICANT CHANGE UP (ref 3.5–5.3)
PROT SERPL-MCNC: 7.5 G/DL — SIGNIFICANT CHANGE UP (ref 6–8.3)
PROT SERPL-MCNC: 7.7 G/DL — SIGNIFICANT CHANGE UP (ref 6–8.3)
PROT UR-MCNC: ABNORMAL
RBC # BLD: 4.64 M/UL — SIGNIFICANT CHANGE UP (ref 4.2–5.8)
RBC # BLD: 4.68 M/UL — SIGNIFICANT CHANGE UP (ref 4.2–5.8)
RBC # BLD: 4.91 M/UL — SIGNIFICANT CHANGE UP (ref 4.2–5.8)
RBC # BLD: 5.14 M/UL — SIGNIFICANT CHANGE UP (ref 4.2–5.8)
RBC # BLD: 5.43 M/UL — SIGNIFICANT CHANGE UP (ref 4.2–5.8)
RBC # FLD: 13.2 % — SIGNIFICANT CHANGE UP (ref 10.3–14.5)
RBC # FLD: 13.2 % — SIGNIFICANT CHANGE UP (ref 10.3–14.5)
RBC # FLD: 13.5 % — SIGNIFICANT CHANGE UP (ref 10.3–14.5)
RBC # FLD: 13.7 % — SIGNIFICANT CHANGE UP (ref 10.3–14.5)
RBC # FLD: 13.8 % — SIGNIFICANT CHANGE UP (ref 10.3–14.5)
SODIUM SERPL-SCNC: 131 MMOL/L — LOW (ref 135–145)
SODIUM SERPL-SCNC: 132 MMOL/L — LOW (ref 135–145)
SODIUM SERPL-SCNC: 136 MMOL/L — SIGNIFICANT CHANGE UP (ref 135–145)
SP GR SPEC: >1.05 (ref 1.01–1.02)
SPECIMEN SOURCE: SIGNIFICANT CHANGE UP
UROBILINOGEN FLD QL: NEGATIVE — SIGNIFICANT CHANGE UP
WBC # BLD: 21.5 K/UL — HIGH (ref 3.8–10.5)
WBC # BLD: 22.1 K/UL — HIGH (ref 3.8–10.5)
WBC # BLD: 22.8 K/UL — HIGH (ref 3.8–10.5)
WBC # BLD: 22.9 K/UL — HIGH (ref 3.8–10.5)
WBC # BLD: 25 K/UL — HIGH (ref 3.8–10.5)
WBC # FLD AUTO: 21.5 K/UL — HIGH (ref 3.8–10.5)
WBC # FLD AUTO: 22.1 K/UL — HIGH (ref 3.8–10.5)
WBC # FLD AUTO: 22.8 K/UL — HIGH (ref 3.8–10.5)
WBC # FLD AUTO: 22.9 K/UL — HIGH (ref 3.8–10.5)
WBC # FLD AUTO: 25 K/UL — HIGH (ref 3.8–10.5)

## 2018-12-06 PROCEDURE — 71045 X-RAY EXAM CHEST 1 VIEW: CPT | Mod: 26

## 2018-12-06 PROCEDURE — 93010 ELECTROCARDIOGRAM REPORT: CPT | Mod: 77

## 2018-12-06 PROCEDURE — 93010 ELECTROCARDIOGRAM REPORT: CPT

## 2018-12-06 RX ORDER — HYDROMORPHONE HYDROCHLORIDE 2 MG/ML
0.5 INJECTION INTRAMUSCULAR; INTRAVENOUS; SUBCUTANEOUS ONCE
Qty: 0 | Refills: 0 | Status: DISCONTINUED | OUTPATIENT
Start: 2018-12-06 | End: 2018-12-11

## 2018-12-06 RX ORDER — HYDROMORPHONE HYDROCHLORIDE 2 MG/ML
0.25 INJECTION INTRAMUSCULAR; INTRAVENOUS; SUBCUTANEOUS ONCE
Qty: 0 | Refills: 0 | Status: DISCONTINUED | OUTPATIENT
Start: 2018-12-06 | End: 2018-12-06

## 2018-12-06 RX ORDER — HYDROMORPHONE HYDROCHLORIDE 2 MG/ML
1 INJECTION INTRAMUSCULAR; INTRAVENOUS; SUBCUTANEOUS EVERY 4 HOURS
Qty: 0 | Refills: 0 | Status: DISCONTINUED | OUTPATIENT
Start: 2018-12-06 | End: 2018-12-09

## 2018-12-06 RX ORDER — ACETAMINOPHEN 500 MG
1000 TABLET ORAL ONCE
Qty: 0 | Refills: 0 | Status: COMPLETED | OUTPATIENT
Start: 2018-12-06 | End: 2018-12-06

## 2018-12-06 RX ADMIN — Medication 400 MILLIGRAM(S): at 05:24

## 2018-12-06 RX ADMIN — Medication 400 MILLIGRAM(S): at 13:31

## 2018-12-06 RX ADMIN — HYDROMORPHONE HYDROCHLORIDE 0.25 MILLIGRAM(S): 2 INJECTION INTRAMUSCULAR; INTRAVENOUS; SUBCUTANEOUS at 08:24

## 2018-12-06 RX ADMIN — Medication 1000 MILLIGRAM(S): at 07:05

## 2018-12-06 RX ADMIN — HYDROMORPHONE HYDROCHLORIDE 1 MILLIGRAM(S): 2 INJECTION INTRAMUSCULAR; INTRAVENOUS; SUBCUTANEOUS at 13:00

## 2018-12-06 RX ADMIN — Medication 400 MILLIGRAM(S): at 00:15

## 2018-12-06 RX ADMIN — Medication 1000 MILLIGRAM(S): at 14:23

## 2018-12-06 RX ADMIN — HYDROMORPHONE HYDROCHLORIDE 1 MILLIGRAM(S): 2 INJECTION INTRAMUSCULAR; INTRAVENOUS; SUBCUTANEOUS at 12:35

## 2018-12-06 RX ADMIN — HYDROMORPHONE HYDROCHLORIDE 1 MILLIGRAM(S): 2 INJECTION INTRAMUSCULAR; INTRAVENOUS; SUBCUTANEOUS at 23:48

## 2018-12-06 RX ADMIN — HYDROMORPHONE HYDROCHLORIDE 0.25 MILLIGRAM(S): 2 INJECTION INTRAMUSCULAR; INTRAVENOUS; SUBCUTANEOUS at 09:00

## 2018-12-06 RX ADMIN — PIPERACILLIN AND TAZOBACTAM 25 GRAM(S): 4; .5 INJECTION, POWDER, LYOPHILIZED, FOR SOLUTION INTRAVENOUS at 14:23

## 2018-12-06 RX ADMIN — PIPERACILLIN AND TAZOBACTAM 25 GRAM(S): 4; .5 INJECTION, POWDER, LYOPHILIZED, FOR SOLUTION INTRAVENOUS at 05:19

## 2018-12-06 NOTE — PROGRESS NOTE ADULT - ASSESSMENT
38M PPD1 from US and fluoroscopy guided IR placement of right upper quadrant perihepatic drain    PLAN:  - NPO with IVF  - Pain management  - Follow up blood cultures  - monitor VS  - dvt ppx, oob/ambulate as tolerated 38M PPD1 from US and fluoroscopy guided IR placement of right upper quadrant perihepatic drain    PLAN:  - NPO with IVF for endoscopy today  - GI c/s  - Pain management  - Follow up blood cultures  - monitor VS  - dvt ppx, oob/ambulate as tolerated 38M PPD1 from US and fluoroscopy guided IR placement of right upper quadrant perihepatic drain    PLAN:  - NPO with IVF, iv abx for endoscopy today  - GI c/s for ecp for bile leak   - Pain management  - Follow up blood cultures  - monitor VS  - dvt ppx, oob/ambulate as tolerated

## 2018-12-06 NOTE — CONSULT NOTE ADULT - PROBLEM SELECTOR RECOMMENDATION 9
- s/p laparoscopic lap patrick 12/3  - post op day 1 from US and fluoroscopy guided IR placement of right upper quadrant perihepatic drain  - npo for ERCP today, pt is aware and agreeable  - IV abx as ordered  - IVF  - pain control  - will follow

## 2018-12-06 NOTE — PROGRESS NOTE ADULT - SUBJECTIVE AND OBJECTIVE BOX
Red Team Surgery Progress Note    SUBJECTIVE: Patient seen and examined at the bedside. Patient had drain placed through an intercostal approach into the right subdiaphragmatic space by IR. He was febrile and tachycardic after the procedure at which point blood cultures and a full set of labs were drawn and then again at 5 AM. Feeling well this morning. Has been NPO without nausea or vomiting. Pain is well controlled.      VITALS  T(C): 38.9 (12-06-18 @ 05:05), Max: 39.9 (12-06-18 @ 00:03)  HR: 125 (12-06-18 @ 05:05) (72 - 125)  BP: 126/80 (12-06-18 @ 05:05) (108/69 - 157/85)  RR: 16 (12-06-18 @ 05:05) (16 - 18)  SpO2: 93% (12-06-18 @ 05:05) (92% - 97%)  CAPILLARY BLOOD GLUCOSE      POCT Blood Glucose.: 174 mg/dL (05 Dec 2018 09:38)    Is/Os    12-05 @ 07:01  -  12-06 @ 05:21  --------------------------------------------------------  IN:    IV PiggyBack: 25 mL    lactated ringers.: 500 mL  Total IN: 525 mL    OUT:    Drain: 200 mL    Voided: 600 mL  Total OUT: 800 mL    Total NET: -275 mL        PHYSICAL EXAM:   General: NAD, Lying in bed comfortably, alert, oriented x3  Pulm: Non-labored breathing on RA  GI/Abd: Soft, tender in RUQ, mildly distended, no rebound/guarding, REMI drain in place and collecting serosang fluid     MEDICATIONS (STANDING): acetaminophen  IVPB .. 1000 milliGRAM(s) IV Intermittent once  lactated ringers. 1000 milliLiter(s) IV Continuous <Continuous>  piperacillin/tazobactam IVPB. 3.375 Gram(s) IV Intermittent every 8 hours    MEDICATIONS (PRN):  LABS  CBC (12-06 @ 04:47)                              15.0                           22.1<H>  )----------------(  310        --    % Neutrophils, --    % Lymphocytes, ANC: --                                  45.1    CBC (12-06 @ 00:54)                              14.0                           22.8<H>  )----------------(  315        --    % Neutrophils, --    % Lymphocytes, ANC: --                                  42.2      BMP (12-06 @ 04:47)             132<L>  |  96      |  13    		Ca++ --      Ca 9.2                ---------------------------------( 120<H>		Mg 2.4                4.7     |  24      |  0.68  			Ph 2.0<L>  BMP (12-06 @ 00:55)             131<L>  |  97      |  13    		Ca++ --      Ca 8.6                ---------------------------------( 122<H>		Mg 2.1                4.2     |  22      |  0.47<L>			Ph 2.0<L>    LFTs (12-06 @ 04:47)      TPro 7.7 / Alb 3.2<L> / TBili 2.3<H> / DBili 1.4<H> / AST 37 / ALT 60<H> / AlkPhos 107  LFTs (12-05 @ 10:35)      TPro 7.7 / Alb 2.7<L> / TBili 1.2 / DBili -- / AST 18 / ALT 40 / AlkPhos 95    Coags (12-05 @ 16:11)  aPTT 29.4 / INR 1.47<H> / PT 16.9<H>      ABG (12-05 @ 12:00)      /  /  /  /  / %     Lactate:  1.6

## 2018-12-06 NOTE — PROGRESS NOTE ADULT - SUBJECTIVE AND OBJECTIVE BOX
Interventional Radiology Follow- Up Note      38y Male PSHX of cholecystectomy 12/3 with postoperative bile leak s/p drainage of biloma on 12/5  in Interventional Radiology with Dr. Crews. Reports abdominal pain and SOB. Reports chest pain with SOB.           Vitals: T(F): 98.3 (12-06-18 @ 09:08), Max: 103.9 (12-06-18 @ 00:03)  HR: 115 (12-06-18 @ 09:08) (72 - 125)  BP: 122/79 (12-06-18 @ 09:08) (108/69 - 157/85)  RR: 18 (12-06-18 @ 09:08) (16 - 18)  SpO2: 92% (12-06-18 @ 09:08) (92% - 97%)  Wt(kg): --    LABS:                        14.0   25.0  )-----------( 292      ( 06 Dec 2018 09:08 )             40.4     12-06    132<L>  |  96  |  13  ----------------------------<  120<H>  4.7   |  24  |  0.68    Ca    9.2      06 Dec 2018 04:47  Phos  2.0     12-06  Mg     2.4     12-06    TPro  7.7  /  Alb  3.2<L>  /  TBili  2.3<H>  /  DBili  1.4<H>  /  AST  37  /  ALT  60<H>  /  AlkPhos  107  12-06    PT/INR - ( 05 Dec 2018 16:11 )   PT: 16.9 sec;   INR: 1.47 ratio         PTT - ( 05 Dec 2018 16:11 )  PTT:29.4 sec      Culture - Body Fluid with Gram Stain (collected 12-06-18 @ 03:40)  Source: .Body Fluid right upper quadrant perihepatic fluid  Gram Stain (12-06-18 @ 07:12):    polymorphonuclear leukocytes seen    No organisms seen    by cytocentrifuge      PHYSICAL EXAM:  General: Nontoxic, in NAD  Neuro:  Alert & oriented x 3  Abdomen: distended. ttp. [place] drain bag with green bilious output. Drain site dressing c/d/i.     Impression: 38y Male s/p drainage of Biloma.     Plan:  - Consider CTA r/o PE. D/w Lauren SHER from surgery team.   - IV antibiotics   -continue to monitor out put.  -Flush drain per doctor orders.  -trend vitals, labs.  -will discuss with IR attending.     Please call IR at extension [phone] or [phone] with any questions, concerns, or issues regarding above. Interventional Radiology Follow- Up Note      38y Male PSHX of cholecystectomy 12/3 with postoperative bile leak s/p drainage of biloma on 12/5  in Interventional Radiology with Dr. Crews. Reports abdominal pain and SOB. Reports chest pain with SOB.           Vitals: T(F): 98.3 (12-06-18 @ 09:08), Max: 103.9 (12-06-18 @ 00:03)  HR: 115 (12-06-18 @ 09:08) (72 - 125)  BP: 122/79 (12-06-18 @ 09:08) (108/69 - 157/85)  RR: 18 (12-06-18 @ 09:08) (16 - 18)  SpO2: 92% (12-06-18 @ 09:08) (92% - 97%)  Wt(kg): --    LABS:                        14.0   25.0  )-----------( 292      ( 06 Dec 2018 09:08 )             40.4     12-06    132<L>  |  96  |  13  ----------------------------<  120<H>  4.7   |  24  |  0.68    Ca    9.2      06 Dec 2018 04:47  Phos  2.0     12-06  Mg     2.4     12-06    TPro  7.7  /  Alb  3.2<L>  /  TBili  2.3<H>  /  DBili  1.4<H>  /  AST  37  /  ALT  60<H>  /  AlkPhos  107  12-06    PT/INR - ( 05 Dec 2018 16:11 )   PT: 16.9 sec;   INR: 1.47 ratio         PTT - ( 05 Dec 2018 16:11 )  PTT:29.4 sec      Culture - Body Fluid with Gram Stain (collected 12-06-18 @ 03:40)  Source: .Body Fluid right upper quadrant perihepatic fluid  Gram Stain (12-06-18 @ 07:12):    polymorphonuclear leukocytes seen    No organisms seen    by cytocentrifuge      PHYSICAL EXAM:  General: Nontoxic, in NAD  Neuro:  Alert & oriented x 3  Abdomen: distended. ttp. [place] drain bag with green bilious output. Drain site dressing c/d/i.     Impression: 38y Male s/p drainage of Biloma.     Plan:  - Consider CTA r/o PE. D/w Lauren SHER from surgery team.   - ERCP today per GI.  - IV antibiotics   -continue to monitor out put.  -Flush drain per doctor orders.  -trend vitals, labs.  -will discuss with IR attending.     Please call IR at extension [phone] or [phone] with any questions, concerns, or issues regarding above. Interventional Radiology Follow- Up Note      38y Male PSHX of cholecystectomy 12/3 with postoperative bile leak s/p drainage of biloma on 12/5  in Interventional Radiology with Dr. Crews. Reports abdominal pain and SOB. Reports chest pain with SOB.           Vitals: T(F): 98.3 (12-06-18 @ 09:08), Max: 103.9 (12-06-18 @ 00:03)  HR: 115 (12-06-18 @ 09:08) (72 - 125)  BP: 122/79 (12-06-18 @ 09:08) (108/69 - 157/85)  RR: 18 (12-06-18 @ 09:08) (16 - 18)  SpO2: 92% (12-06-18 @ 09:08) (92% - 97%)  Wt(kg): --    LABS:                        14.0   25.0  )-----------( 292      ( 06 Dec 2018 09:08 )             40.4     12-06    132<L>  |  96  |  13  ----------------------------<  120<H>  4.7   |  24  |  0.68    Ca    9.2      06 Dec 2018 04:47  Phos  2.0     12-06  Mg     2.4     12-06    TPro  7.7  /  Alb  3.2<L>  /  TBili  2.3<H>  /  DBili  1.4<H>  /  AST  37  /  ALT  60<H>  /  AlkPhos  107  12-06    PT/INR - ( 05 Dec 2018 16:11 )   PT: 16.9 sec;   INR: 1.47 ratio         PTT - ( 05 Dec 2018 16:11 )  PTT:29.4 sec      Culture - Body Fluid with Gram Stain (collected 12-06-18 @ 03:40)  Source: .Body Fluid right upper quadrant perihepatic fluid  Gram Stain (12-06-18 @ 07:12):    polymorphonuclear leukocytes seen    No organisms seen    by cytocentrifuge      PHYSICAL EXAM:  General: Nontoxic, in NAD  Neuro:  Alert & oriented x 3  Abdomen: distended. ttp. [place] drain bag with green bilious output. Drain site dressing c/d/i.     Impression: 38y Male s/p drainage of Biloma.     Plan:  - D/w Lauren SHER from surgery team.   - ERCP today per GI.  - IV antibiotics   -continue to monitor out put.  -Flush drain per doctor orders.  -trend vitals, labs.  - D/w Dr. Crews.    Please call IR at extension [phone] or [phone] with any questions, concerns, or issues regarding above. Interventional Radiology Follow- Up Note      38y Male PSHX of cholecystectomy 12/3 with postoperative bile leak s/p drainage of biloma on 12/5  in Interventional Radiology with Dr. Crews. Reports abdominal pain and SOB. Reports chest pain with deep breaths.       Vitals: T(F): 98.3 (12-06-18 @ 09:08), Max: 103.9 (12-06-18 @ 00:03)  HR: 115 (12-06-18 @ 09:08) (72 - 125)  BP: 122/79 (12-06-18 @ 09:08) (108/69 - 157/85)  RR: 18 (12-06-18 @ 09:08) (16 - 18)  SpO2: 92% (12-06-18 @ 09:08) (92% - 97%)  Wt(kg): --    LABS:                        14.0   25.0  )-----------( 292      ( 06 Dec 2018 09:08 )             40.4     12-06    132<L>  |  96  |  13  ----------------------------<  120<H>  4.7   |  24  |  0.68    Ca    9.2      06 Dec 2018 04:47  Phos  2.0     12-06  Mg     2.4     12-06    TPro  7.7  /  Alb  3.2<L>  /  TBili  2.3<H>  /  DBili  1.4<H>  /  AST  37  /  ALT  60<H>  /  AlkPhos  107  12-06    PT/INR - ( 05 Dec 2018 16:11 )   PT: 16.9 sec;   INR: 1.47 ratio         PTT - ( 05 Dec 2018 16:11 )  PTT:29.4 sec      Culture - Body Fluid with Gram Stain (collected 12-06-18 @ 03:40)  Source: .Body Fluid right upper quadrant perihepatic fluid  Gram Stain (12-06-18 @ 07:12):    polymorphonuclear leukocytes seen    No organisms seen    by cytocentrifuge      PHYSICAL EXAM:  General: Nontoxic, in NAD  Neuro:  Alert & oriented x 3  Abdomen: distended. ttp. [place] drain bag with green bilious output. Drain site dressing c/d/i.     Impression: 38y Male s/p drainage of Biloma.     Plan:  - D/w Lauren SHER from surgery team.   - ERCP today per GI.  - IV antibiotics   -continue to monitor out put.  -Flush drain per doctor orders.  -trend vitals, labs.  - D/w Dr. Crews.    Please call IR at extension [phone] or [phone] with any questions, concerns, or issues regarding above.

## 2018-12-06 NOTE — PROGRESS NOTE ADULT - SUBJECTIVE AND OBJECTIVE BOX
Pre-Endoscopy Evaluation      Referring Physician:  dr. missy mcallister                                 Procedure: ercp    Indication for Procedure: bile leak    Pertinent History: 38y male with chronic cholecystitis/cholelithiasis s/p laparoscopic cholecystostomy tube placement in August 2018 and now s/p laparoscopic cholecystectomy 12/3/18 with post-operative bile leak--s/p drainage of bilioma      PAST MEDICAL & SURGICAL HISTORY:  Prediabetes  GERD (gastroesophageal reflux disease): no medications  Scoliosis  CHRISTINE (obstructive sleep apnea): no sleep studies done but had witnessed apnea by his brother  Calculus of gallbladder with cholecystitis without biliary obstruction, unspecified cholecystitis acuity  S/P laparoscopic cholecystectomy: 12/03/18  H/O abdominal surgery: insertion of biliary drain 2018      PMH of Gastroparesis [ ]  Gastric Surgery [ ]  Gastric Outlet Obstruction [ ]    Allergies:    adhesives (Rash)  cortisone (Rash)    Intolerances:    Latex allergy: [ ] yes [X] no    Medications:MEDICATIONS  (STANDING):  HYDROmorphone  Injectable 0.5 milliGRAM(s) IV Push once  lactated ringers. 1000 milliLiter(s) (125 mL/Hr) IV Continuous <Continuous>  piperacillin/tazobactam IVPB. 3.375 Gram(s) IV Intermittent every 8 hours  sodium phosphate IVPB 30 milliMole(s) IV Intermittent once    MEDICATIONS  (PRN):  HYDROmorphone  Injectable 1 milliGRAM(s) IV Push every 4 hours PRN Severe Pain (7 - 10)      Smoking: [ ] yes  [X] no    AICD/PPM: [ ] yes   [X] no    Pertinent lab data:                        14.0   25.0  )-----------( 292      ( 06 Dec 2018 09:08 )             40.4     12-06    132<L>  |  96  |  13  ----------------------------<  120<H>  4.7   |  24  |  0.68    Ca    9.2      06 Dec 2018 04:47  Phos  2.0     12-06  Mg     2.4     12-06    TPro  7.7  /  Alb  3.2<L>  /  TBili  2.3<H>  /  DBili  1.4<H>  /  AST  37  /  ALT  60<H>  /  AlkPhos  107  12-06    PT/INR - ( 05 Dec 2018 16:11 )   PT: 16.9 sec;   INR: 1.47 ratio         PTT - ( 05 Dec 2018 16:11 )  PTT:29.4 sec  CARDIAC MARKERS ( 05 Dec 2018 11:25 )  <.017 ng/mL / x     / x     / x     / x          < from: TTE Echo Complete w/Doppler (11.28.18 @ 10:36) >  PROCEDURE DATE:  11/28/2018      Summary:   1. Left ventricular ejection fraction, by visual estimation, is 55 to   60%.   2. Normal global left ventricular systolic function.   3. Spectral Doppler shows pseudonormal pattern of left ventricular   myocardial filling (Grade II diastolic dysfunction).   4. Normal right ventricular size and function.   5. The left atrium is normal in size.   6. The right atrium is normal in size.   7. Mild tricuspid regurgitation.   8. LA volume Index is 117.1 ml/m² ml/m2.   9. The aortic valve mean gradient is 4.1 mmHg consistent with normally   opening aortic valve.  ---------------------------------------------------------------------------------------------      Physical Examination:  Daily Height in cm: 165.1 (05 Dec 2018 13:50)    Daily   Vital Signs Last 24 Hrs  T(C): 38.4 (06 Dec 2018 12:23), Max: 39.9 (06 Dec 2018 00:03)  T(F): 101.1 (06 Dec 2018 12:23), Max: 103.9 (06 Dec 2018 00:03)  HR: 115 (06 Dec 2018 12:23) (72 - 128)  BP: 142/84 (06 Dec 2018 12:23) (108/69 - 157/85)  BP(mean): --  RR: 18 (06 Dec 2018 12:23) (16 - 20)  SpO2: 92% (06 Dec 2018 12:23) (92% - 95%)      Constitutional: NAD    Neck:  No JVD    Respiratory: CTAB/L    Cardiovascular: S1 and S2    Gastrointestinal: BS+, soft, NT/ND    Extremities: No peripheral edema    Neurological: A/O x 3    : No Miller    Skin: No rashes    Comments:    ASA Class: I [ ]  II [ ]  III [X]  IV [ ]    The patient is a suitable candidate for the planned procedure unless box checked [ ]  No, explain:

## 2018-12-07 DIAGNOSIS — Z71.89 OTHER SPECIFIED COUNSELING: ICD-10-CM

## 2018-12-07 LAB
ALBUMIN SERPL ELPH-MCNC: 3 G/DL — LOW (ref 3.3–5)
ALP SERPL-CCNC: 110 U/L — SIGNIFICANT CHANGE UP (ref 40–120)
ALT FLD-CCNC: 58 U/L — HIGH (ref 10–45)
AMYLASE P1 CFR SERPL: 93 U/L — SIGNIFICANT CHANGE UP (ref 25–125)
ANION GAP SERPL CALC-SCNC: 11 MMOL/L — SIGNIFICANT CHANGE UP (ref 5–17)
AST SERPL-CCNC: 26 U/L — SIGNIFICANT CHANGE UP (ref 10–40)
BILIRUB DIRECT SERPL-MCNC: 0.3 MG/DL — HIGH (ref 0–0.2)
BILIRUB INDIRECT FLD-MCNC: 0.5 MG/DL — SIGNIFICANT CHANGE UP (ref 0.2–1)
BILIRUB SERPL-MCNC: 0.8 MG/DL — SIGNIFICANT CHANGE UP (ref 0.2–1.2)
BUN SERPL-MCNC: 22 MG/DL — SIGNIFICANT CHANGE UP (ref 7–23)
CALCIUM SERPL-MCNC: 8.7 MG/DL — SIGNIFICANT CHANGE UP (ref 8.4–10.5)
CHLORIDE SERPL-SCNC: 100 MMOL/L — SIGNIFICANT CHANGE UP (ref 96–108)
CO2 SERPL-SCNC: 26 MMOL/L — SIGNIFICANT CHANGE UP (ref 22–31)
CREAT SERPL-MCNC: 0.64 MG/DL — SIGNIFICANT CHANGE UP (ref 0.5–1.3)
GLUCOSE SERPL-MCNC: 108 MG/DL — HIGH (ref 70–99)
HCT VFR BLD CALC: 37.1 % — LOW (ref 39–50)
HGB BLD-MCNC: 12 G/DL — LOW (ref 13–17)
LIDOCAIN IGE QN: 80 U/L — HIGH (ref 7–60)
MAGNESIUM SERPL-MCNC: 2.6 MG/DL — SIGNIFICANT CHANGE UP (ref 1.6–2.6)
MCHC RBC-ENTMCNC: 27.2 PG — SIGNIFICANT CHANGE UP (ref 27–34)
MCHC RBC-ENTMCNC: 32.3 GM/DL — SIGNIFICANT CHANGE UP (ref 32–36)
MCV RBC AUTO: 84.2 FL — SIGNIFICANT CHANGE UP (ref 80–100)
PHOSPHATE SERPL-MCNC: 3 MG/DL — SIGNIFICANT CHANGE UP (ref 2.5–4.5)
PLATELET # BLD AUTO: 263 K/UL — SIGNIFICANT CHANGE UP (ref 150–400)
POTASSIUM SERPL-MCNC: 3.2 MMOL/L — LOW (ref 3.5–5.3)
POTASSIUM SERPL-SCNC: 3.2 MMOL/L — LOW (ref 3.5–5.3)
PROT SERPL-MCNC: 7.2 G/DL — SIGNIFICANT CHANGE UP (ref 6–8.3)
RBC # BLD: 4.4 M/UL — SIGNIFICANT CHANGE UP (ref 4.2–5.8)
RBC # FLD: 13.6 % — SIGNIFICANT CHANGE UP (ref 10.3–14.5)
SODIUM SERPL-SCNC: 137 MMOL/L — SIGNIFICANT CHANGE UP (ref 135–145)
WBC # BLD: 19.3 K/UL — HIGH (ref 3.8–10.5)
WBC # FLD AUTO: 19.3 K/UL — HIGH (ref 3.8–10.5)

## 2018-12-07 PROCEDURE — 71045 X-RAY EXAM CHEST 1 VIEW: CPT | Mod: 26

## 2018-12-07 PROCEDURE — 93010 ELECTROCARDIOGRAM REPORT: CPT

## 2018-12-07 PROCEDURE — 99231 SBSQ HOSP IP/OBS SF/LOW 25: CPT

## 2018-12-07 PROCEDURE — 74018 RADEX ABDOMEN 1 VIEW: CPT | Mod: 26

## 2018-12-07 RX ORDER — HEPARIN SODIUM 5000 [USP'U]/ML
5000 INJECTION INTRAVENOUS; SUBCUTANEOUS EVERY 8 HOURS
Qty: 0 | Refills: 0 | Status: DISCONTINUED | OUTPATIENT
Start: 2018-12-07 | End: 2018-12-17

## 2018-12-07 RX ORDER — ACETAMINOPHEN 500 MG
975 TABLET ORAL EVERY 6 HOURS
Qty: 0 | Refills: 0 | Status: DISCONTINUED | OUTPATIENT
Start: 2018-12-07 | End: 2018-12-17

## 2018-12-07 RX ORDER — POTASSIUM CHLORIDE 20 MEQ
10 PACKET (EA) ORAL ONCE
Qty: 0 | Refills: 0 | Status: DISCONTINUED | OUTPATIENT
Start: 2018-12-07 | End: 2018-12-07

## 2018-12-07 RX ORDER — POTASSIUM CHLORIDE 20 MEQ
10 PACKET (EA) ORAL
Qty: 0 | Refills: 0 | Status: COMPLETED | OUTPATIENT
Start: 2018-12-07 | End: 2018-12-07

## 2018-12-07 RX ORDER — POTASSIUM CHLORIDE 20 MEQ
20 PACKET (EA) ORAL ONCE
Qty: 0 | Refills: 0 | Status: DISCONTINUED | OUTPATIENT
Start: 2018-12-07 | End: 2018-12-07

## 2018-12-07 RX ORDER — POTASSIUM CHLORIDE 20 MEQ
20 PACKET (EA) ORAL EVERY 4 HOURS
Qty: 0 | Refills: 0 | Status: COMPLETED | OUTPATIENT
Start: 2018-12-07 | End: 2018-12-08

## 2018-12-07 RX ORDER — SODIUM CHLORIDE 9 MG/ML
1000 INJECTION, SOLUTION INTRAVENOUS
Qty: 0 | Refills: 0 | Status: DISCONTINUED | OUTPATIENT
Start: 2018-12-07 | End: 2018-12-08

## 2018-12-07 RX ORDER — SODIUM CHLORIDE 9 MG/ML
1000 INJECTION, SOLUTION INTRAVENOUS ONCE
Qty: 0 | Refills: 0 | Status: COMPLETED | OUTPATIENT
Start: 2018-12-07 | End: 2018-12-07

## 2018-12-07 RX ADMIN — PIPERACILLIN AND TAZOBACTAM 25 GRAM(S): 4; .5 INJECTION, POWDER, LYOPHILIZED, FOR SOLUTION INTRAVENOUS at 20:54

## 2018-12-07 RX ADMIN — SODIUM CHLORIDE 1000 MILLILITER(S): 9 INJECTION, SOLUTION INTRAVENOUS at 16:21

## 2018-12-07 RX ADMIN — HYDROMORPHONE HYDROCHLORIDE 1 MILLIGRAM(S): 2 INJECTION INTRAMUSCULAR; INTRAVENOUS; SUBCUTANEOUS at 23:10

## 2018-12-07 RX ADMIN — HYDROMORPHONE HYDROCHLORIDE 1 MILLIGRAM(S): 2 INJECTION INTRAMUSCULAR; INTRAVENOUS; SUBCUTANEOUS at 00:30

## 2018-12-07 RX ADMIN — HYDROMORPHONE HYDROCHLORIDE 1 MILLIGRAM(S): 2 INJECTION INTRAMUSCULAR; INTRAVENOUS; SUBCUTANEOUS at 11:41

## 2018-12-07 RX ADMIN — PIPERACILLIN AND TAZOBACTAM 25 GRAM(S): 4; .5 INJECTION, POWDER, LYOPHILIZED, FOR SOLUTION INTRAVENOUS at 14:25

## 2018-12-07 RX ADMIN — Medication 85 MILLIMOLE(S): at 00:14

## 2018-12-07 RX ADMIN — Medication 975 MILLIGRAM(S): at 19:09

## 2018-12-07 RX ADMIN — HYDROMORPHONE HYDROCHLORIDE 1 MILLIGRAM(S): 2 INJECTION INTRAMUSCULAR; INTRAVENOUS; SUBCUTANEOUS at 11:56

## 2018-12-07 RX ADMIN — Medication 975 MILLIGRAM(S): at 19:39

## 2018-12-07 RX ADMIN — HYDROMORPHONE HYDROCHLORIDE 1 MILLIGRAM(S): 2 INJECTION INTRAMUSCULAR; INTRAVENOUS; SUBCUTANEOUS at 16:16

## 2018-12-07 RX ADMIN — HYDROMORPHONE HYDROCHLORIDE 1 MILLIGRAM(S): 2 INJECTION INTRAMUSCULAR; INTRAVENOUS; SUBCUTANEOUS at 23:40

## 2018-12-07 RX ADMIN — Medication 100 MILLIEQUIVALENT(S): at 13:28

## 2018-12-07 RX ADMIN — HEPARIN SODIUM 5000 UNIT(S): 5000 INJECTION INTRAVENOUS; SUBCUTANEOUS at 20:57

## 2018-12-07 RX ADMIN — HYDROMORPHONE HYDROCHLORIDE 1 MILLIGRAM(S): 2 INJECTION INTRAMUSCULAR; INTRAVENOUS; SUBCUTANEOUS at 16:31

## 2018-12-07 RX ADMIN — Medication 100 MILLIEQUIVALENT(S): at 11:42

## 2018-12-07 RX ADMIN — Medication 20 MILLIEQUIVALENT(S): at 20:54

## 2018-12-07 RX ADMIN — Medication 100 MILLIEQUIVALENT(S): at 15:10

## 2018-12-07 RX ADMIN — SODIUM CHLORIDE 75 MILLILITER(S): 9 INJECTION, SOLUTION INTRAVENOUS at 13:24

## 2018-12-07 RX ADMIN — PIPERACILLIN AND TAZOBACTAM 25 GRAM(S): 4; .5 INJECTION, POWDER, LYOPHILIZED, FOR SOLUTION INTRAVENOUS at 05:32

## 2018-12-07 NOTE — PROGRESS NOTE ADULT - ASSESSMENT
38M PPD2 from US and fluoroscopy guided IR placement of right upper quadrant perihepatic drain, now PPD1 from ERCP for bile leak with sphincterotomy and stent placement.    PLAN:  - Diet: CLD  - Pain control as needed  - Follow up blood cultures  - monitor VS  - dvt ppx, oob/ambulate as tolerated 38M PPD2 from US and fluoroscopy guided IR placement of right upper quadrant perihepatic drain, now PPD1 from ERCP for bile leak with sphincterotomy and stent placement.    PLAN:  - Diet: CLD  - cont abx  - Pain control as needed  - Follow up blood cultures  - monitor VS  - dvt ppx, oob/ambulate as tolerated  - outpt gi & IR f/u  - f/u with primary surgeon Dr Santino Pritchett after discharge

## 2018-12-07 NOTE — PROGRESS NOTE ADULT - ASSESSMENT
38 year old male with chronic cholecystitis/cholelithiasis s/p laparoscopic cholecystostomy tube placement in August 2018 and now s/p laparoscopic cholecystectomy 12/3/18 with post-operative bile leak. Pt is post op day 1 from US and fluoroscopy guided IR placement of right upper quadrant perihepatic drain. GI consulted.

## 2018-12-07 NOTE — PROGRESS NOTE ADULT - SUBJECTIVE AND OBJECTIVE BOX
Interval Events: s/p ERCP with A bile leak was found. - A sphincterotomy was performed.  - One covered metal stent was placed into the common bile duct.     Pt seen and examined. He denies abdominal pain, n/v. Tolerating clear liquids.     MEDICATIONS  (STANDING):  dextrose 5% + sodium chloride 0.45% 1000 milliLiter(s) (75 mL/Hr) IV Continuous <Continuous>  HYDROmorphone  Injectable 0.5 milliGRAM(s) IV Push once  piperacillin/tazobactam IVPB. 3.375 Gram(s) IV Intermittent every 8 hours  potassium chloride  10 mEq/100 mL IVPB 10 milliEquivalent(s) IV Intermittent once  potassium chloride  10 mEq/100 mL IVPB 10 milliEquivalent(s) IV Intermittent every 1 hour    MEDICATIONS  (PRN):  HYDROmorphone  Injectable 1 milliGRAM(s) IV Push every 4 hours PRN Severe Pain (7 - 10)      Allergies    adhesives (Rash)  cortisone (Rash)    Intolerances        Review of Systems:    General:  No wt loss, fevers, chills, night sweats,fatigue,   Eyes:  Good vision, no reported pain  ENT:  No sore throat, pain, runny nose, dysphagia  CV:  No pain, palpitations, hypo/hypertension  Resp:  No dyspnea, cough, tachypnea, wheezing  GI:  No pain, No nausea, No vomiting, No diarrhea, No constipation, No weight loss, No fever, No pruritis, No rectal bleeding, No melena, No dysphagia  :  No pain, bleeding, incontinence, nocturia  Muscle:  No pain, weakness  Neuro:  No weakness, tingling, memory problems  Psych:  No fatigue, insomnia, mood problems, depression  Endocrine:  No polyuria, polydypsia, cold/heat intolerance  Heme:  No petechiae, ecchymosis, easy bruisability  Skin:  No rash, tattoos, scars, edema      Vital Signs Last 24 Hrs  T(C): 36.5 (07 Dec 2018 09:12), Max: 37.3 (06 Dec 2018 15:36)  T(F): 97.7 (07 Dec 2018 09:12), Max: 99.2 (06 Dec 2018 15:36)  HR: 85 (07 Dec 2018 09:12) (74 - 113)  BP: 127/74 (07 Dec 2018 09:12) (104/63 - 130/80)  BP(mean): --  RR: 17 (07 Dec 2018 09:12) (17 - 18)  SpO2: 96% (07 Dec 2018 09:12) (94% - 98%)    PHYSICAL EXAM:    Constitutional: NAD, well-developed  HEENT: EOMI, throat clear  Neck: No LAD, supple  Respiratory: CTA and P  Cardiovascular: S1 and S2, RRR, no M  Gastrointestinal: Soft, NT/ND, no rebound/guarding, right sided IR drain in place and collecting bilious fluid, old lap sites with steris in place, old drain site healing well  Extremities: No peripheral edema, neg clubbing cyanosis  Vascular: 2+ peripheral pulses  Neurological: A/O x 3, no focal deficits  Psychiatric: Normal mood, normal affect  Skin: No rashes      LABS:                        12.0   19.3  )-----------( 263      ( 07 Dec 2018 06:35 )             37.1     12-07    137  |  100  |  22  ----------------------------<  108<H>  3.2<L>   |  26  |  0.64    Ca    8.7      07 Dec 2018 06:35  Phos  3.0     12-07  Mg     2.6     12-07    TPro  7.2  /  Alb  3.0<L>  /  TBili  0.8  /  DBili  0.3<H>  /  AST  26  /  ALT  58<H>  /  AlkPhos  110  12-07    PT/INR - ( 05 Dec 2018 16:11 )   PT: 16.9 sec;   INR: 1.47 ratio         PTT - ( 05 Dec 2018 16:11 )  PTT:29.4 sec  Urinalysis Basic - ( 06 Dec 2018 00:53 )    Color: Dark Yellow / Appearance: Clear / SG: >1.050 / pH: x  Gluc: x / Ketone: Negative  / Bili: Small / Urobili: Negative   Blood: x / Protein: 100 mg/dL / Nitrite: Negative   Leuk Esterase: Negative / RBC: 7 /hpf / WBC 6 /hpf   Sq Epi: x / Non Sq Epi: 3 /hpf / Bacteria: Negative        RADIOLOGY & ADDITIONAL TESTS:

## 2018-12-07 NOTE — PROVIDER CONTACT NOTE (CRITICAL VALUE NOTIFICATION) - BACKGROUND
pt admitted for bile collection, s/p ERCP for bile leak, sphincterotomy, fully covered billiary stent placed

## 2018-12-07 NOTE — PROGRESS NOTE ADULT - SUBJECTIVE AND OBJECTIVE BOX
Interventional Radiology Follow- Up Note      This is a 38y Male with PSH of cholecystectomy 12/3 with postoperative bile leak s/p drainage of biloma on 12/5  in Interventional Radiology with Dr. Crews. Pt underwent endoscopy yesterday with GI, bile leak found s/p sphincterotomy and covered metal stent placement in CBD.     Pt seen and examined at bedside. He reports significant improvement in abdominal pain, SOB VSS.     PAST MEDICAL & SURGICAL HISTORY:  Prediabetes  GERD (gastroesophageal reflux disease): no medications  Scoliosis  CHRISTINE (obstructive sleep apnea): no sleep studies done but had witnessed apnea by his brother  Calculus of gallbladder with cholecystitis without biliary obstruction, unspecified cholecystitis acuity  S/P laparoscopic cholecystectomy: 12/03/18  H/O abdominal surgery: insertion of biliary drain 2018      Allergies: adhesives (Rash)  cortisone (Rash)    LABS:                        12.0   19.3  )-----------( 263      ( 07 Dec 2018 06:35 )             37.1     12-07    137  |  100  |  22  ----------------------------<  108<H>  3.2<L>   |  26  |  0.64    Ca    8.7      07 Dec 2018 06:35  Phos  3.0     12-07  Mg     2.6     12-07    TPro  7.2  /  Alb  3.0<L>  /  TBili  0.8  /  DBili  0.3<H>  /  AST  26  /  ALT  58<H>  /  AlkPhos  110  12-07    PT/INR - ( 05 Dec 2018 16:11 )   PT: 16.9 sec;   INR: 1.47 ratio         PTT - ( 05 Dec 2018 16:11 )  PTT:29.4 sec    perihepatic collection cultures: neg to date    Vitals: T(F): 97.7 (12-07-18 @ 09:12), Max: 102.1 (12-06-18 @ 12:08)  HR: 85 (12-07-18 @ 09:12) (74 - 115)  BP: 127/74 (12-07-18 @ 09:12) (104/63 - 142/84)  RR: 17 (12-07-18 @ 09:12) (17 - 18)  SpO2: 96% (12-07-18 @ 09:12) (92% - 98%)    PHYSICAL EXAM:  General: Nontoxic, in NAD, A&O x3   Abdomen: ND, soft, TTP around drainage catheter. Drain intact to drainage bag with scant amount of clear bile in bag. 20cc/24hr output documented in chart record.  Dressing c/d/i    A/P: 38y Male with h/o cholecystectomy 12/3 with postoperative bile leak s/p drainage of biloma on 12/5, s/p sphincterotomy and CBD stent placement on 12/6.   -Recommend repeat imaging and tube check once output <5cc daily or 7-10 days post discharge.   -continue to monitor output    -flush drain per doctor orders  -trend vitals, labs  -Above d/w Dr. Crews     Please call IR at extension [phone] with any questions, concerns, or issues regarding above.

## 2018-12-07 NOTE — PROGRESS NOTE ADULT - SUBJECTIVE AND OBJECTIVE BOX
D Team Surgery Progress Note    SUBJECTIVE: Patient seen and examined at the bedside. Feeling well this morning. Tolerating clear liquids without nausea or vomiting. Pain is well controlled. Has passed flatus and a bowel movement. Ambulating well.     VITALS  T(C): 36.6 (12-07-18 @ 05:35), Max: 38.9 (12-06-18 @ 12:08)  HR: 74 (12-07-18 @ 05:35) (74 - 128)  BP: 104/65 (12-07-18 @ 05:35) (104/63 - 142/84)  RR: 17 (12-07-18 @ 05:35) (17 - 20)  SpO2: 94% (12-07-18 @ 05:35) (92% - 98%)  CAPILLARY BLOOD GLUCOSE        Is/Os    12-06 @ 07:01  -  12-07 @ 07:00  --------------------------------------------------------  IN:    IV PiggyBack: 500 mL    lactated ringers.: 2500 mL  Total IN: 3000 mL    OUT:    Drain: 20 mL    Voided: 1150 mL  Total OUT: 1170 mL    Total NET: 1830 mL    PHYSICAL EXAM:   General: NAD, Lying in bed comfortably, alert, oriented x3 no longer diaphoretic  Pulm: Non-labored breathing on RA  GI/Abd: Soft, NT/ND, no rebound/guarding, right sided IR drain in place and collecting bilious fluid, old lap sites with steris in place, old drain site healing well    MEDICATIONS (STANDING): HYDROmorphone  Injectable 0.5 milliGRAM(s) IV Push once  lactated ringers. 1000 milliLiter(s) IV Continuous <Continuous>  piperacillin/tazobactam IVPB. 3.375 Gram(s) IV Intermittent every 8 hours    MEDICATIONS (PRN):HYDROmorphone  Injectable 1 milliGRAM(s) IV Push every 4 hours PRN Severe Pain (7 - 10)    LABS  CBC (12-06 @ 23:20)                              12.9<L>                         22.9<H>  )----------------(  286        --    % Neutrophils, --    % Lymphocytes, ANC: --                                  38.3<L>  CBC (12-06 @ 14:18)                              13.3                           21.5<H>  )----------------(  280        --    % Neutrophils, --    % Lymphocytes, ANC: --                                  38.4<L>    BMP (12-07 @ 06:35)             137     |  100     |  22    		Ca++ --      Ca 8.7                ---------------------------------( 108<H>		Mg 2.6                3.2<L>  |  26      |  0.64  			Ph 3.0     BMP (12-06 @ 23:20)             136     |  98      |  21    		Ca++ --      Ca 8.6                ---------------------------------( 124<H>		Mg --                 4.2     |  23      |  0.66  			Ph --        LFTs (12-07 @ 06:35)      TPro 7.2 / Alb 3.0<L> / TBili 0.8 / DBili 0.3<H> / AST 26 / ALT 58<H> / AlkPhos 110  LFTs (12-06 @ 23:20)      TPro 7.5 / Alb 3.0<L> / TBili 1.0 / DBili 0.5<H> / AST 30 / ALT 59<H> / AlkPhos 115

## 2018-12-07 NOTE — CHART NOTE - NSCHARTNOTEFT_GEN_A_CORE
SP ERCP     Impression:     - A bile leak was found.                       - A sphincterotomy was performed.                       - One covered metal stent was placed into the common bile duct.      Vital Signs Last 24 Hrs  T(C): 36.8 (06 Dec 2018 21:41), Max: 38.9 (06 Dec 2018 05:05)  T(F): 98.3 (06 Dec 2018 21:41), Max: 102.1 (06 Dec 2018 05:05)  HR: 75 (06 Dec 2018 21:41) (75 - 128)  BP: 111/58 (06 Dec 2018 21:41) (111/58 - 142/84)  BP(mean): --  RR: 17 (06 Dec 2018 21:41) (16 - 20)  SpO2: 98% (06 Dec 2018 21:41) (92% - 98%)    I&O's Detail    05 Dec 2018 07:01  -  06 Dec 2018 07:00  --------------------------------------------------------  IN:    IV PiggyBack: 25 mL    lactated ringers.: 500 mL    lactated ringers.: 450 mL    Oral Fluid: 240 mL  Total IN: 1215 mL    OUT:    Drain: 200 mL    Voided: 600 mL  Total OUT: 800 mL    Total NET: 415 mL      06 Dec 2018 07:01  -  07 Dec 2018 00:59  --------------------------------------------------------  IN:    lactated ringers.: 1000 mL  Total IN: 1000 mL    OUT:    Voided: 1150 mL  Total OUT: 1150 mL    Total NET: -150 mL          MEDICATIONS  (STANDING):  HYDROmorphone  Injectable 0.5 milliGRAM(s) IV Push once  lactated ringers. 1000 milliLiter(s) (125 mL/Hr) IV Continuous <Continuous>  piperacillin/tazobactam IVPB. 3.375 Gram(s) IV Intermittent every 8 hours    MEDICATIONS  (PRN):  HYDROmorphone  Injectable 1 milliGRAM(s) IV Push every 4 hours PRN Severe Pain (7 - 10)      LABS:                        12.9   22.9  )-----------( 286      ( 06 Dec 2018 23:20 )             38.3     12-06    136  |  98  |  21  ----------------------------<  124<H>  4.2   |  23  |  0.66    Ca    8.6      06 Dec 2018 23:20  Phos  2.0     12-06  Mg     2.4     12-06    TPro  7.5  /  Alb  3.0<L>  /  TBili  1.0  /  DBili  0.5<H>  /  AST  30  /  ALT  59<H>  /  AlkPhos  115  12-06    PT/INR - ( 05 Dec 2018 16:11 )   PT: 16.9 sec;   INR: 1.47 ratio         PTT - ( 05 Dec 2018 16:11 )  PTT:29.4 sec  Urinalysis Basic - ( 06 Dec 2018 00:53 )    Color: Dark Yellow / Appearance: Clear / SG: >1.050 / pH: x  Gluc: x / Ketone: Negative  / Bili: Small / Urobili: Negative   Blood: x / Protein: 100 mg/dL / Nitrite: Negative   Leuk Esterase: Negative / RBC: 7 /hpf / WBC 6 /hpf   Sq Epi: x / Non Sq Epi: 3 /hpf / Bacteria: Negative      LIVER FUNCTIONS - ( 06 Dec 2018 23:20 )  Alb: 3.0 g/dL / Pro: 7.5 g/dL / ALK PHOS: 115 U/L / ALT: 59 U/L / AST: 30 U/L / GGT: x

## 2018-12-08 LAB
-  AMIKACIN: SIGNIFICANT CHANGE UP
-  AMIKACIN: SIGNIFICANT CHANGE UP
-  AMOXICILLIN/CLAVULANIC ACID: SIGNIFICANT CHANGE UP
-  AMPICILLIN/SULBACTAM: SIGNIFICANT CHANGE UP
-  AMPICILLIN: SIGNIFICANT CHANGE UP
-  AZTREONAM: SIGNIFICANT CHANGE UP
-  AZTREONAM: SIGNIFICANT CHANGE UP
-  CEFAZOLIN: SIGNIFICANT CHANGE UP
-  CEFEPIME: SIGNIFICANT CHANGE UP
-  CEFEPIME: SIGNIFICANT CHANGE UP
-  CEFOXITIN: SIGNIFICANT CHANGE UP
-  CEFTAZIDIME: SIGNIFICANT CHANGE UP
-  CEFTRIAXONE: SIGNIFICANT CHANGE UP
-  CIPROFLOXACIN: SIGNIFICANT CHANGE UP
-  CIPROFLOXACIN: SIGNIFICANT CHANGE UP
-  ERTAPENEM: SIGNIFICANT CHANGE UP
-  GENTAMICIN: SIGNIFICANT CHANGE UP
-  GENTAMICIN: SIGNIFICANT CHANGE UP
-  IMIPENEM: SIGNIFICANT CHANGE UP
-  IMIPENEM: SIGNIFICANT CHANGE UP
-  LEVOFLOXACIN: SIGNIFICANT CHANGE UP
-  LEVOFLOXACIN: SIGNIFICANT CHANGE UP
-  MEROPENEM: SIGNIFICANT CHANGE UP
-  MEROPENEM: SIGNIFICANT CHANGE UP
-  PIPERACILLIN/TAZOBACTAM: SIGNIFICANT CHANGE UP
-  PIPERACILLIN/TAZOBACTAM: SIGNIFICANT CHANGE UP
-  TOBRAMYCIN: SIGNIFICANT CHANGE UP
-  TOBRAMYCIN: SIGNIFICANT CHANGE UP
-  TRIMETHOPRIM/SULFAMETHOXAZOLE: SIGNIFICANT CHANGE UP
ALBUMIN SERPL ELPH-MCNC: 2.9 G/DL — LOW (ref 3.3–5)
ALP SERPL-CCNC: 155 U/L — HIGH (ref 40–120)
ALT FLD-CCNC: 59 U/L — HIGH (ref 10–45)
ANION GAP SERPL CALC-SCNC: 11 MMOL/L — SIGNIFICANT CHANGE UP (ref 5–17)
AST SERPL-CCNC: 37 U/L — SIGNIFICANT CHANGE UP (ref 10–40)
BILIRUB DIRECT SERPL-MCNC: 0.4 MG/DL — HIGH (ref 0–0.2)
BILIRUB INDIRECT FLD-MCNC: 0.4 MG/DL — SIGNIFICANT CHANGE UP (ref 0.2–1)
BILIRUB SERPL-MCNC: 0.8 MG/DL — SIGNIFICANT CHANGE UP (ref 0.2–1.2)
BUN SERPL-MCNC: 10 MG/DL — SIGNIFICANT CHANGE UP (ref 7–23)
CALCIUM SERPL-MCNC: 8 MG/DL — LOW (ref 8.4–10.5)
CHLORIDE SERPL-SCNC: 99 MMOL/L — SIGNIFICANT CHANGE UP (ref 96–108)
CO2 SERPL-SCNC: 22 MMOL/L — SIGNIFICANT CHANGE UP (ref 22–31)
CREAT SERPL-MCNC: 0.51 MG/DL — SIGNIFICANT CHANGE UP (ref 0.5–1.3)
GLUCOSE SERPL-MCNC: 95 MG/DL — SIGNIFICANT CHANGE UP (ref 70–99)
HCT VFR BLD CALC: 34.9 % — LOW (ref 39–50)
HGB BLD-MCNC: 11.3 G/DL — LOW (ref 13–17)
MAGNESIUM SERPL-MCNC: 2 MG/DL — SIGNIFICANT CHANGE UP (ref 1.6–2.6)
MCHC RBC-ENTMCNC: 26.9 PG — LOW (ref 27–34)
MCHC RBC-ENTMCNC: 32.5 GM/DL — SIGNIFICANT CHANGE UP (ref 32–36)
MCV RBC AUTO: 82.8 FL — SIGNIFICANT CHANGE UP (ref 80–100)
METHOD TYPE: SIGNIFICANT CHANGE UP
METHOD TYPE: SIGNIFICANT CHANGE UP
PHOSPHATE SERPL-MCNC: 2.2 MG/DL — LOW (ref 2.5–4.5)
PLATELET # BLD AUTO: 283 K/UL — SIGNIFICANT CHANGE UP (ref 150–400)
POTASSIUM SERPL-MCNC: 4.1 MMOL/L — SIGNIFICANT CHANGE UP (ref 3.5–5.3)
POTASSIUM SERPL-SCNC: 4.1 MMOL/L — SIGNIFICANT CHANGE UP (ref 3.5–5.3)
PROT SERPL-MCNC: 6.9 G/DL — SIGNIFICANT CHANGE UP (ref 6–8.3)
RBC # BLD: 4.21 M/UL — SIGNIFICANT CHANGE UP (ref 4.2–5.8)
RBC # FLD: 13.6 % — SIGNIFICANT CHANGE UP (ref 10.3–14.5)
SODIUM SERPL-SCNC: 132 MMOL/L — LOW (ref 135–145)
WBC # BLD: 16.2 K/UL — HIGH (ref 3.8–10.5)
WBC # FLD AUTO: 16.2 K/UL — HIGH (ref 3.8–10.5)

## 2018-12-08 PROCEDURE — 74176 CT ABD & PELVIS W/O CONTRAST: CPT | Mod: 26

## 2018-12-08 RX ORDER — MEROPENEM 1 G/30ML
2000 INJECTION INTRAVENOUS EVERY 8 HOURS
Qty: 0 | Refills: 0 | Status: DISCONTINUED | OUTPATIENT
Start: 2018-12-08 | End: 2018-12-11

## 2018-12-08 RX ORDER — SENNA PLUS 8.6 MG/1
1 TABLET ORAL DAILY
Qty: 0 | Refills: 0 | Status: DISCONTINUED | OUTPATIENT
Start: 2018-12-08 | End: 2018-12-17

## 2018-12-08 RX ORDER — ALBUTEROL 90 UG/1
2.5 AEROSOL, METERED ORAL ONCE
Qty: 0 | Refills: 0 | Status: COMPLETED | OUTPATIENT
Start: 2018-12-08 | End: 2018-12-08

## 2018-12-08 RX ORDER — ACETAMINOPHEN 500 MG
1000 TABLET ORAL ONCE
Qty: 0 | Refills: 0 | Status: COMPLETED | OUTPATIENT
Start: 2018-12-08 | End: 2018-12-08

## 2018-12-08 RX ORDER — DOCUSATE SODIUM 100 MG
100 CAPSULE ORAL DAILY
Qty: 0 | Refills: 0 | Status: DISCONTINUED | OUTPATIENT
Start: 2018-12-08 | End: 2018-12-15

## 2018-12-08 RX ADMIN — MEROPENEM 200 MILLIGRAM(S): 1 INJECTION INTRAVENOUS at 21:35

## 2018-12-08 RX ADMIN — Medication 975 MILLIGRAM(S): at 05:40

## 2018-12-08 RX ADMIN — Medication 975 MILLIGRAM(S): at 01:13

## 2018-12-08 RX ADMIN — Medication 975 MILLIGRAM(S): at 19:20

## 2018-12-08 RX ADMIN — Medication 975 MILLIGRAM(S): at 01:43

## 2018-12-08 RX ADMIN — MEROPENEM 200 MILLIGRAM(S): 1 INJECTION INTRAVENOUS at 15:37

## 2018-12-08 RX ADMIN — Medication 975 MILLIGRAM(S): at 18:40

## 2018-12-08 RX ADMIN — ALBUTEROL 2.5 MILLIGRAM(S): 90 AEROSOL, METERED ORAL at 14:56

## 2018-12-08 RX ADMIN — HEPARIN SODIUM 5000 UNIT(S): 5000 INJECTION INTRAVENOUS; SUBCUTANEOUS at 21:35

## 2018-12-08 RX ADMIN — PIPERACILLIN AND TAZOBACTAM 25 GRAM(S): 4; .5 INJECTION, POWDER, LYOPHILIZED, FOR SOLUTION INTRAVENOUS at 05:09

## 2018-12-08 RX ADMIN — Medication 1000 MILLIGRAM(S): at 22:05

## 2018-12-08 RX ADMIN — Medication 975 MILLIGRAM(S): at 12:30

## 2018-12-08 RX ADMIN — HYDROMORPHONE HYDROCHLORIDE 1 MILLIGRAM(S): 2 INJECTION INTRAMUSCULAR; INTRAVENOUS; SUBCUTANEOUS at 04:09

## 2018-12-08 RX ADMIN — HYDROMORPHONE HYDROCHLORIDE 1 MILLIGRAM(S): 2 INJECTION INTRAMUSCULAR; INTRAVENOUS; SUBCUTANEOUS at 17:38

## 2018-12-08 RX ADMIN — Medication 62.5 MILLIMOLE(S): at 11:48

## 2018-12-08 RX ADMIN — ALBUTEROL 2.5 MILLIGRAM(S): 90 AEROSOL, METERED ORAL at 20:07

## 2018-12-08 RX ADMIN — Medication 20 MILLIEQUIVALENT(S): at 01:13

## 2018-12-08 RX ADMIN — HEPARIN SODIUM 5000 UNIT(S): 5000 INJECTION INTRAVENOUS; SUBCUTANEOUS at 05:12

## 2018-12-08 RX ADMIN — HYDROMORPHONE HYDROCHLORIDE 1 MILLIGRAM(S): 2 INJECTION INTRAMUSCULAR; INTRAVENOUS; SUBCUTANEOUS at 03:39

## 2018-12-08 RX ADMIN — Medication 975 MILLIGRAM(S): at 11:51

## 2018-12-08 RX ADMIN — HEPARIN SODIUM 5000 UNIT(S): 5000 INJECTION INTRAVENOUS; SUBCUTANEOUS at 14:55

## 2018-12-08 RX ADMIN — Medication 400 MILLIGRAM(S): at 21:35

## 2018-12-08 RX ADMIN — Medication 100 MILLIGRAM(S): at 21:35

## 2018-12-08 RX ADMIN — Medication 975 MILLIGRAM(S): at 05:10

## 2018-12-08 RX ADMIN — HYDROMORPHONE HYDROCHLORIDE 1 MILLIGRAM(S): 2 INJECTION INTRAMUSCULAR; INTRAVENOUS; SUBCUTANEOUS at 17:22

## 2018-12-08 RX ADMIN — SENNA PLUS 1 TABLET(S): 8.6 TABLET ORAL at 21:35

## 2018-12-08 NOTE — PROGRESS NOTE ADULT - ASSESSMENT
38M PPD3 from US and fluoroscopy guided IR placement of right upper quadrant perihepatic drain, now PPD2 from ERCP for bile leak with sphincterotomy and stent placement.    PLAN:  - Diet: CLD  - CT AP Non Cont and Tube check by IR  - cont abx  - Pain control as needed  - Follow up blood cultures  - monitor VS  - dvt ppx, oob/ambulate as tolerated  - outpt gi & IR f/u  - f/u with primary surgeon Dr Santino Pritchett after discharge

## 2018-12-08 NOTE — PROGRESS NOTE ADULT - SUBJECTIVE AND OBJECTIVE BOX
Red Team Surgery Progress Note    SUBJECTIVE: Patient seen and examined at the bedside. Overnight, the patient had continued complaints of (1) bloating/abdominal distension and pressure (2) difficulty taking a deep breath with "discomfort" in the right chest, but denies pain in the area and (3) minimal output from IR drain. With regards to the abdominal bloating and pressure and minimal drain output, IR was called for a tube check. IR requested CTAP non-contrast to be ordered prior to them seeing the patient. Abdominal Xray was ordered and showed non-obstructive gas pattern. Chest Xray and EKG to evaluate the difficulty taking a deep breath were negative for PNX, pleural effusion or acute cardiac events. The Chest did show a significantly elevated right diaphragm consistent with the perihepatic fluid collections seen on CT. Has been afebrile and mostly tachycardic though the most recent VS show HR 94. No other complaints at this time. Tolerating clear liquids without nausea or vomiting. Pain is well controlled. Has not passed flatus or a bowel movement. Ambulating well.     VITALS  T(C): 37.7 (12-08-18 @ 01:09), Max: 37.8 (12-07-18 @ 14:54)  HR: 94 (12-08-18 @ 01:09) (74 - 113)  BP: 138/80 (12-08-18 @ 01:09) (104/65 - 138/80)  RR: 17 (12-08-18 @ 01:09) (16 - 18)  SpO2: 94% (12-08-18 @ 01:09) (94% - 98%)      Is/Os    12-06 @ 07:01  -  12-07 @ 07:00  --------------------------------------------------------  IN:    IV PiggyBack: 500 mL    lactated ringers.: 2500 mL  Total IN: 3000 mL    OUT:    Drain: 20 mL    Voided: 1150 mL  Total OUT: 1170 mL    Total NET: 1830 mL      12-07 @ 07:01  -  12-08 @ 04:42  --------------------------------------------------------  IN:    dextrose 5% + sodium chloride 0.45%: 1355 mL    IV PiggyBack: 300 mL    Lactated Ringers IV Bolus: 1000 mL    lactated ringers.: 250 mL    Oral Fluid: 590 mL  Total IN: 3495 mL    OUT:    Drain: 20 mL    Voided: 1225 mL  Total OUT: 1245 mL    Total NET: 2250 mL    PHYSICAL EXAM:   General: NAD, Lying in bed comfortably, alert, oriented x3  Pulm: Non-labored breathing  GI/Abd: Distended, soft in LLQ and RLQ, NT, no rebound/guarding, IR drain in place in RLQ and collecting minimal bilious fluid lap sites covered with steri strips, old drain site in RLQ healing well    MEDICATIONS (STANDING): acetaminophen   Tablet .. 975 milliGRAM(s) Oral every 6 hours  dextrose 5% + sodium chloride 0.45% 1000 milliLiter(s) IV Continuous <Continuous>  heparin  Injectable 5000 Unit(s) SubCutaneous every 8 hours  HYDROmorphone  Injectable 0.5 milliGRAM(s) IV Push once  piperacillin/tazobactam IVPB. 3.375 Gram(s) IV Intermittent every 8 hours    MEDICATIONS (PRN):HYDROmorphone  Injectable 1 milliGRAM(s) IV Push every 4 hours PRN Severe Pain (7 - 10)    LABS  CBC (12-07 @ 06:35)                              12.0<L>                         19.3<H>  )----------------(  263        --    % Neutrophils, --    % Lymphocytes, ANC: --                                  37.1<L>  CBC (12-06 @ 23:20)                              12.9<L>                         22.9<H>  )----------------(  286        --    % Neutrophils, --    % Lymphocytes, ANC: --                                  38.3<L>    BMP (12-07 @ 06:35)             137     |  100     |  22    		Ca++ --      Ca 8.7                ---------------------------------( 108<H>		Mg 2.6                3.2<L>  |  26      |  0.64  			Ph 3.0     BMP (12-06 @ 23:20)             136     |  98      |  21    		Ca++ --      Ca 8.6                ---------------------------------( 124<H>		Mg --                 4.2     |  23      |  0.66  			Ph --        LFTs (12-07 @ 06:35)      TPro 7.2 / Alb 3.0<L> / TBili 0.8 / DBili 0.3<H> / AST 26 / ALT 58<H> / AlkPhos 110  LFTs (12-06 @ 23:20)      TPro 7.5 / Alb 3.0<L> / TBili 1.0 / DBili 0.5<H> / AST 30 / ALT 59<H> / AlkPhos 115

## 2018-12-08 NOTE — CHART NOTE - NSCHARTNOTEFT_GEN_A_CORE
Called by primary team for minimal drainage from perihepatic drain. CT shows a perihepatic drain in proper location with decreased size of perihepatic collection. Patient had no fevers today and WBC count is downtrending. Will plan for tube check Monday.    Case discussed with Dr. Mitchell. poc explained to pt. Waiting for xray.

## 2018-12-08 NOTE — PROVIDER CONTACT NOTE (OTHER) - ASSESSMENT
Pt A/Ox4, VSS. Pt c/o of abd discomfort, subsided w/ ambulation. No output from biliary drain from 12/7 1900, unable to flush drain.

## 2018-12-08 NOTE — PROGRESS NOTE ADULT - SUBJECTIVE AND OBJECTIVE BOX
INTERVAL HPI/OVERNIGHT EVENTS:  No new overnight event.  No N/V/D.  Tolerating diet.  c/o some sob now abd distended cxr neg    Allergies    adhesives (Rash)  cortisone (Rash)    Intolerances          General:  No wt loss, fevers, chills, night sweats, fatigue,   Eyes:  Good vision, no reported pain  ENT:  No sore throat, pain, runny nose, dysphagia  CV:  No pain, palpitations, hypo/hypertension  Resp:  No dyspnea, cough, tachypnea, wheezing  GI:  No pain, No nausea, No vomiting, No diarrhea, No constipation, No weight loss, No fever, No pruritis, No rectal bleeding, No tarry stools, No dysphagia,  :  No pain, bleeding, incontinence, nocturia  Muscle:  No pain, weakness  Neuro:  No weakness, tingling, memory problems  Psych:  No fatigue, insomnia, mood problems, depression  Endocrine:  No polyuria, polydipsia, cold/heat intolerance  Heme:  No petechiae, ecchymosis, easy bruisability  Skin:  No rash, tattoos, scars, edema      PHYSICAL EXAM:   Vital Signs:  Vital Signs Last 24 Hrs  T(C): 37.6 (08 Dec 2018 09:27), Max: 37.8 (07 Dec 2018 14:54)  T(F): 99.6 (08 Dec 2018 09:27), Max: 100.1 (07 Dec 2018 14:54)  HR: 94 (08 Dec 2018 09:27) (85 - 113)  BP: 130/79 (08 Dec 2018 09:27) (111/67 - 138/80)  BP(mean): --  RR: 18 (08 Dec 2018 09:27) (16 - 18)  SpO2: 96% (08 Dec 2018 09:27) (94% - 98%)  Daily     Daily I&O's Summary    07 Dec 2018 07:01  -  08 Dec 2018 07:00  --------------------------------------------------------  IN: 3820 mL / OUT: 1545 mL / NET: 2275 mL    08 Dec 2018 07:01  -  08 Dec 2018 13:03  --------------------------------------------------------  IN: 0 mL / OUT: 200 mL / NET: -200 mL        GENERAL:  Appears stated age, well-groomed, well-nourished, no distress  HEENT:  NC/AT,  conjunctivae clear and pink, no thyromegaly, nodules, adenopathy, no JVD, sclera -anicteric  CHEST:  Full & symmetric excursion, no increased effort, breath sounds clear  HEART:  Regular rhythm, S1, S2, no murmur/rub/S3/S4, no abdominal bruit, no edema  ABDOMEN:  Soft, non-tender, non-distended, normoactive bowel sounds,  no masses ,no hepato-splenomegaly, no signs of chronic liver disease ruq drain  EXTEREMITIES:  no cyanosis,clubbing or edema  SKIN:  No rash/erythema/ecchymoses/petechiae/wounds/abscess/warm/dry  NEURO:  Alert, oriented, no asterixis, no tremor, no encephalopathy      LABS:                        11.3   16.2  )-----------( 283      ( 08 Dec 2018 05:56 )             34.9     12-08    132<L>  |  99  |  10  ----------------------------<  95  4.1   |  22  |  0.51    Ca    8.0<L>      08 Dec 2018 05:56  Phos  2.2     12-08  Mg     2.0     12-08    TPro  6.9  /  Alb  2.9<L>  /  TBili  0.8  /  DBili  0.4<H>  /  AST  37  /  ALT  59<H>  /  AlkPhos  155<H>  12-08        amylaseAmylase, Serum Total: 93 U/L (12-07 @ 06:35)     lipaseLipase, Serum: 80 U/L (12-07 @ 06:35)  Lipase, Serum: 100 U/L (12-04 @ 02:15)    RADIOLOGY & ADDITIONAL TESTS:

## 2018-12-09 LAB
ALBUMIN SERPL ELPH-MCNC: 3.1 G/DL — LOW (ref 3.3–5)
ALP SERPL-CCNC: 209 U/L — HIGH (ref 40–120)
ALT FLD-CCNC: 84 U/L — HIGH (ref 10–45)
ANION GAP SERPL CALC-SCNC: 13 MMOL/L — SIGNIFICANT CHANGE UP (ref 5–17)
AST SERPL-CCNC: 42 U/L — HIGH (ref 10–40)
BILIRUB DIRECT SERPL-MCNC: 0.3 MG/DL — HIGH (ref 0–0.2)
BILIRUB INDIRECT FLD-MCNC: 0.4 MG/DL — SIGNIFICANT CHANGE UP (ref 0.2–1)
BILIRUB SERPL-MCNC: 0.7 MG/DL — SIGNIFICANT CHANGE UP (ref 0.2–1.2)
BUN SERPL-MCNC: 9 MG/DL — SIGNIFICANT CHANGE UP (ref 7–23)
CALCIUM SERPL-MCNC: 8.7 MG/DL — SIGNIFICANT CHANGE UP (ref 8.4–10.5)
CHLORIDE SERPL-SCNC: 97 MMOL/L — SIGNIFICANT CHANGE UP (ref 96–108)
CO2 SERPL-SCNC: 23 MMOL/L — SIGNIFICANT CHANGE UP (ref 22–31)
CREAT SERPL-MCNC: 0.48 MG/DL — LOW (ref 0.5–1.3)
GLUCOSE SERPL-MCNC: 76 MG/DL — SIGNIFICANT CHANGE UP (ref 70–99)
HCT VFR BLD CALC: 36 % — LOW (ref 39–50)
HGB BLD-MCNC: 11.8 G/DL — LOW (ref 13–17)
MAGNESIUM SERPL-MCNC: 2.4 MG/DL — SIGNIFICANT CHANGE UP (ref 1.6–2.6)
MCHC RBC-ENTMCNC: 27.3 PG — SIGNIFICANT CHANGE UP (ref 27–34)
MCHC RBC-ENTMCNC: 32.9 GM/DL — SIGNIFICANT CHANGE UP (ref 32–36)
MCV RBC AUTO: 83 FL — SIGNIFICANT CHANGE UP (ref 80–100)
PHOSPHATE SERPL-MCNC: 2.8 MG/DL — SIGNIFICANT CHANGE UP (ref 2.5–4.5)
PLATELET # BLD AUTO: 322 K/UL — SIGNIFICANT CHANGE UP (ref 150–400)
POTASSIUM SERPL-MCNC: 4 MMOL/L — SIGNIFICANT CHANGE UP (ref 3.5–5.3)
POTASSIUM SERPL-SCNC: 4 MMOL/L — SIGNIFICANT CHANGE UP (ref 3.5–5.3)
PROT SERPL-MCNC: 7.4 G/DL — SIGNIFICANT CHANGE UP (ref 6–8.3)
RBC # BLD: 4.33 M/UL — SIGNIFICANT CHANGE UP (ref 4.2–5.8)
RBC # FLD: 14 % — SIGNIFICANT CHANGE UP (ref 10.3–14.5)
SODIUM SERPL-SCNC: 133 MMOL/L — LOW (ref 135–145)
WBC # BLD: 19.2 K/UL — HIGH (ref 3.8–10.5)
WBC # FLD AUTO: 19.2 K/UL — HIGH (ref 3.8–10.5)

## 2018-12-09 RX ORDER — OXYCODONE HYDROCHLORIDE 5 MG/1
5 TABLET ORAL ONCE
Qty: 0 | Refills: 0 | Status: DISCONTINUED | OUTPATIENT
Start: 2018-12-09 | End: 2018-12-09

## 2018-12-09 RX ORDER — SODIUM CHLORIDE 9 MG/ML
1000 INJECTION, SOLUTION INTRAVENOUS
Qty: 0 | Refills: 0 | Status: DISCONTINUED | OUTPATIENT
Start: 2018-12-10 | End: 2018-12-11

## 2018-12-09 RX ORDER — OXYCODONE HYDROCHLORIDE 5 MG/1
10 TABLET ORAL EVERY 6 HOURS
Qty: 0 | Refills: 0 | Status: DISCONTINUED | OUTPATIENT
Start: 2018-12-09 | End: 2018-12-09

## 2018-12-09 RX ADMIN — Medication 975 MILLIGRAM(S): at 05:26

## 2018-12-09 RX ADMIN — Medication 975 MILLIGRAM(S): at 12:20

## 2018-12-09 RX ADMIN — OXYCODONE HYDROCHLORIDE 5 MILLIGRAM(S): 5 TABLET ORAL at 01:43

## 2018-12-09 RX ADMIN — Medication 975 MILLIGRAM(S): at 17:12

## 2018-12-09 RX ADMIN — OXYCODONE HYDROCHLORIDE 5 MILLIGRAM(S): 5 TABLET ORAL at 01:13

## 2018-12-09 RX ADMIN — MEROPENEM 200 MILLIGRAM(S): 1 INJECTION INTRAVENOUS at 22:17

## 2018-12-09 RX ADMIN — Medication 975 MILLIGRAM(S): at 18:00

## 2018-12-09 RX ADMIN — Medication 975 MILLIGRAM(S): at 11:30

## 2018-12-09 RX ADMIN — HEPARIN SODIUM 5000 UNIT(S): 5000 INJECTION INTRAVENOUS; SUBCUTANEOUS at 05:25

## 2018-12-09 RX ADMIN — HEPARIN SODIUM 5000 UNIT(S): 5000 INJECTION INTRAVENOUS; SUBCUTANEOUS at 13:17

## 2018-12-09 RX ADMIN — MEROPENEM 200 MILLIGRAM(S): 1 INJECTION INTRAVENOUS at 14:20

## 2018-12-09 RX ADMIN — Medication 975 MILLIGRAM(S): at 23:40

## 2018-12-09 RX ADMIN — HEPARIN SODIUM 5000 UNIT(S): 5000 INJECTION INTRAVENOUS; SUBCUTANEOUS at 22:17

## 2018-12-09 RX ADMIN — Medication 62.5 MILLIMOLE(S): at 15:48

## 2018-12-09 RX ADMIN — MEROPENEM 200 MILLIGRAM(S): 1 INJECTION INTRAVENOUS at 05:25

## 2018-12-09 NOTE — PROGRESS NOTE ADULT - SUBJECTIVE AND OBJECTIVE BOX
Red Team Surgery Progress Note    SUBJECTIVE: Patient seen and examined at the bedside. Overnight, the patient continued to have bloating/abdominal distension and pressure and difficulty taking a deep breath.    Perihepatic drain with no output.  CT AP revealed tube in position with decreasing size of collection.  IR consulted- plan to perform a tube check tomorrow.  Tolerating a regular, low fat diet without nausea or vomiting. Pain is controlled with analgesics. Has passed flatus and has had a bowel movement. Ambulating well.     Vital Signs Last 24 Hrs  T(C): 36.7 (09 Dec 2018 09:07), Max: 37.8 (08 Dec 2018 16:51)  T(F): 98 (09 Dec 2018 09:07), Max: 100 (08 Dec 2018 16:51)  HR: 65 (09 Dec 2018 09:07) (65 - 106)  BP: 123/79 (09 Dec 2018 09:07) (121/73 - 140/90)  BP(mean): --  RR: 18 (09 Dec 2018 09:07) (16 - 18)  SpO2: 98% (09 Dec 2018 09:07) (96% - 98%)    I&O's Detail    08 Dec 2018 07:01  -  09 Dec 2018 07:00  --------------------------------------------------------  IN:    IV PiggyBack: 400 mL    Oral Fluid: 720 mL  Total IN: 1120 mL    OUT:    Voided: 1450 mL  Total OUT: 1450 mL    Total NET: -330 mL      09 Dec 2018 07:01  -  09 Dec 2018 10:02  --------------------------------------------------------  IN:    Oral Fluid: 160 mL  Total IN: 160 mL    OUT:  Total OUT: 0 mL    Total NET: 160 mL          PHYSICAL EXAM:   General: NAD, Lying in bed comfortably, alert, oriented x3  Pulm: Non-labored breathing  GI/Abd: Distended, soft, NT, no rebound/guarding, IR drain in place in RLQ and collecting minimal bilious fluid lap sites covered with steri strips, old drain site in RLQ healing well    MEDICATIONS (STANDING): acetaminophen   Tablet .. 975 milliGRAM(s) Oral every 6 hours  dextrose 5% + sodium chloride 0.45% 1000 milliLiter(s) IV Continuous <Continuous>  heparin  Injectable 5000 Unit(s) SubCutaneous every 8 hours  HYDROmorphone  Injectable 0.5 milliGRAM(s) IV Push once  piperacillin/tazobactam IVPB. 3.375 Gram(s) IV Intermittent every 8 hours    MEDICATIONS (PRN):HYDROmorphone  Injectable 1 milliGRAM(s) IV Push every 4 hours PRN Severe Pain (7 - 10)    LABS                        11.8   19.2  )-----------( 322      ( 09 Dec 2018 07:26 )             36.0     12-09    133<L>  |  97  |  9   ----------------------------<  76  4.0   |  23  |  0.48<L>    Ca    8.7      09 Dec 2018 07:27  Phos  2.8     12-09  Mg     2.4     12-09    TPro  7.4  /  Alb  3.1<L>  /  TBili  0.7  /  DBili  0.3<H>  /  AST  42<H>  /  ALT  84<H>  /  AlkPhos  209<H>  12-09

## 2018-12-09 NOTE — PROGRESS NOTE ADULT - SUBJECTIVE AND OBJECTIVE BOX
INTERVAL HPI/OVERNIGHT EVENTS:  No new overnight event.  No N/V/D.  Tolerating diet.  still sob   Allergies    adhesives (Rash)  cortisone (Rash)    Intolerances          General:  No wt loss, fevers, chills, night sweats, fatigue,   Eyes:  Good vision, no reported pain  ENT:  No sore throat, pain, runny nose, dysphagia  CV:  No pain, palpitations, hypo/hypertension  Resp:  No dyspnea, cough, tachypnea, wheezing  GI:  No pain, No nausea, No vomiting, No diarrhea, No constipation, No weight loss, No fever, No pruritis, No rectal bleeding, No tarry stools, No dysphagia,  :  No pain, bleeding, incontinence, nocturia  Muscle:  No pain, weakness  Neuro:  No weakness, tingling, memory problems  Psych:  No fatigue, insomnia, mood problems, depression  Endocrine:  No polyuria, polydipsia, cold/heat intolerance  Heme:  No petechiae, ecchymosis, easy bruisability  Skin:  No rash, tattoos, scars, edema      PHYSICAL EXAM:   Vital Signs:  Vital Signs Last 24 Hrs  T(C): 36.8 (09 Dec 2018 13:39), Max: 37.8 (08 Dec 2018 16:51)  T(F): 98.2 (09 Dec 2018 13:39), Max: 100 (08 Dec 2018 16:51)  HR: 87 (09 Dec 2018 13:39) (65 - 93)  BP: 133/80 (09 Dec 2018 13:39) (121/73 - 136/80)  BP(mean): --  RR: 18 (09 Dec 2018 13:39) (17 - 18)  SpO2: 97% (09 Dec 2018 13:39) (96% - 98%)  Daily     Daily I&O's Summary    08 Dec 2018 07:01  -  09 Dec 2018 07:00  --------------------------------------------------------  IN: 1120 mL / OUT: 1450 mL / NET: -330 mL    09 Dec 2018 07:01  -  09 Dec 2018 15:43  --------------------------------------------------------  IN: 640 mL / OUT: 150 mL / NET: 490 mL        GENERAL:  Appears stated age, well-groomed, well-nourished, no distress  HEENT:  NC/AT,  conjunctivae clear and pink, no thyromegaly, nodules, adenopathy, no JVD, sclera -anicteric  CHEST:  Full & symmetric excursion, no increased effort, breath sounds clear  HEART:  Regular rhythm, S1, S2, no murmur/rub/S3/S4, no abdominal bruit, no edema  ABDOMEN:  Soft, non-tender, non-distended, normoactive bowel sounds,  no masses ,no hepato-splenomegaly, no signs of chronic liver disease  EXTEREMITIES:  no cyanosis,clubbing or edema  SKIN:  No rash/erythema/ecchymoses/petechiae/wounds/abscess/warm/dry  NEURO:  Alert, oriented, no asterixis, no tremor, no encephalopathy      LABS:                        11.8   19.2  )-----------( 322      ( 09 Dec 2018 07:26 )             36.0     12-09    133<L>  |  97  |  9   ----------------------------<  76  4.0   |  23  |  0.48<L>    Ca    8.7      09 Dec 2018 07:27  Phos  2.8     12-09  Mg     2.4     12-09    TPro  7.4  /  Alb  3.1<L>  /  TBili  0.7  /  DBili  0.3<H>  /  AST  42<H>  /  ALT  84<H>  /  AlkPhos  209<H>  12-09        amylaseAmylase, Serum Total: 93 U/L (12-07 @ 06:35)     lipaseLipase, Serum: 80 U/L (12-07 @ 06:35)    RADIOLOGY & ADDITIONAL TESTS:

## 2018-12-09 NOTE — PROGRESS NOTE ADULT - ASSESSMENT
38M PPD4 from US and fluoroscopy guided IR placement of right upper quadrant perihepatic drain, now PPD3 from ERCP for bile leak with sphincterotomy and stent placement.    PLAN:  - Diet: Reg (low fat); NPO at midnight  - Preop labs  - cont abx  - Pain control as needed  - Follow up blood cultures  - monitor VS  - dvt ppx, oob/ambulate as tolerated  - outpt gi & IR f/u  - f/u with primary surgeon Dr Santino Pritchett after discharge 38M PPD4 from US and fluoroscopy guided IR placement of right upper quadrant perihepatic drain, now PPD3 from ERCP for bile leak with sphincterotomy and stent placement.    PLAN:  - Diet: Reg (low fat); NPO at midnight for IR tube check in am  - Preop labs  - cont abx  - Pain control as needed  - Follow up blood cultures  - monitor VS  - dvt ppx, oob/ambulate as tolerated  - outpt gi & IR f/u  - f/u with primary surgeon Dr Santino Pritchett after discharge

## 2018-12-10 LAB
ALBUMIN SERPL ELPH-MCNC: 3.1 G/DL — LOW (ref 3.3–5)
ALP SERPL-CCNC: 199 U/L — HIGH (ref 40–120)
ALT FLD-CCNC: 60 U/L — HIGH (ref 10–45)
ANION GAP SERPL CALC-SCNC: 14 MMOL/L — SIGNIFICANT CHANGE UP (ref 5–17)
APTT BLD: 29.8 SEC — SIGNIFICANT CHANGE UP (ref 27.5–36.3)
AST SERPL-CCNC: 22 U/L — SIGNIFICANT CHANGE UP (ref 10–40)
BILIRUB DIRECT SERPL-MCNC: 0.2 MG/DL — SIGNIFICANT CHANGE UP (ref 0–0.2)
BILIRUB INDIRECT FLD-MCNC: 0.4 MG/DL — SIGNIFICANT CHANGE UP (ref 0.2–1)
BILIRUB SERPL-MCNC: 0.6 MG/DL — SIGNIFICANT CHANGE UP (ref 0.2–1.2)
BUN SERPL-MCNC: 9 MG/DL — SIGNIFICANT CHANGE UP (ref 7–23)
CALCIUM SERPL-MCNC: 8.8 MG/DL — SIGNIFICANT CHANGE UP (ref 8.4–10.5)
CHLORIDE SERPL-SCNC: 96 MMOL/L — SIGNIFICANT CHANGE UP (ref 96–108)
CO2 SERPL-SCNC: 24 MMOL/L — SIGNIFICANT CHANGE UP (ref 22–31)
CREAT SERPL-MCNC: 0.51 MG/DL — SIGNIFICANT CHANGE UP (ref 0.5–1.3)
CULTURE RESULTS: SIGNIFICANT CHANGE UP
CULTURE RESULTS: SIGNIFICANT CHANGE UP
GLUCOSE SERPL-MCNC: 110 MG/DL — HIGH (ref 70–99)
HCT VFR BLD CALC: 37.8 % — LOW (ref 39–50)
HGB BLD-MCNC: 12.2 G/DL — LOW (ref 13–17)
INR BLD: 1.24 RATIO — HIGH (ref 0.88–1.16)
MAGNESIUM SERPL-MCNC: 2.4 MG/DL — SIGNIFICANT CHANGE UP (ref 1.6–2.6)
MCHC RBC-ENTMCNC: 26.7 PG — LOW (ref 27–34)
MCHC RBC-ENTMCNC: 32.3 GM/DL — SIGNIFICANT CHANGE UP (ref 32–36)
MCV RBC AUTO: 82.7 FL — SIGNIFICANT CHANGE UP (ref 80–100)
PHOSPHATE SERPL-MCNC: 2.7 MG/DL — SIGNIFICANT CHANGE UP (ref 2.5–4.5)
PLATELET # BLD AUTO: 387 K/UL — SIGNIFICANT CHANGE UP (ref 150–400)
POTASSIUM SERPL-MCNC: 4.3 MMOL/L — SIGNIFICANT CHANGE UP (ref 3.5–5.3)
POTASSIUM SERPL-SCNC: 4.3 MMOL/L — SIGNIFICANT CHANGE UP (ref 3.5–5.3)
PROT SERPL-MCNC: 7.6 G/DL — SIGNIFICANT CHANGE UP (ref 6–8.3)
PROTHROM AB SERPL-ACNC: 14.2 SEC — HIGH (ref 10–12.9)
RBC # BLD: 4.58 M/UL — SIGNIFICANT CHANGE UP (ref 4.2–5.8)
RBC # FLD: 14 % — SIGNIFICANT CHANGE UP (ref 10.3–14.5)
SODIUM SERPL-SCNC: 134 MMOL/L — LOW (ref 135–145)
SPECIMEN SOURCE: SIGNIFICANT CHANGE UP
SPECIMEN SOURCE: SIGNIFICANT CHANGE UP
WBC # BLD: 18.6 K/UL — HIGH (ref 3.8–10.5)
WBC # FLD AUTO: 18.6 K/UL — HIGH (ref 3.8–10.5)

## 2018-12-10 PROCEDURE — 75984 XRAY CONTROL CATHETER CHANGE: CPT | Mod: 26

## 2018-12-10 PROCEDURE — 49423 EXCHANGE DRAINAGE CATHETER: CPT

## 2018-12-10 RX ADMIN — MEROPENEM 200 MILLIGRAM(S): 1 INJECTION INTRAVENOUS at 05:34

## 2018-12-10 RX ADMIN — Medication 975 MILLIGRAM(S): at 05:34

## 2018-12-10 RX ADMIN — HEPARIN SODIUM 5000 UNIT(S): 5000 INJECTION INTRAVENOUS; SUBCUTANEOUS at 16:09

## 2018-12-10 RX ADMIN — MEROPENEM 200 MILLIGRAM(S): 1 INJECTION INTRAVENOUS at 21:24

## 2018-12-10 RX ADMIN — Medication 975 MILLIGRAM(S): at 16:12

## 2018-12-10 RX ADMIN — Medication 975 MILLIGRAM(S): at 17:00

## 2018-12-10 RX ADMIN — Medication 100 MILLIGRAM(S): at 21:24

## 2018-12-10 RX ADMIN — Medication 975 MILLIGRAM(S): at 12:00

## 2018-12-10 RX ADMIN — HEPARIN SODIUM 5000 UNIT(S): 5000 INJECTION INTRAVENOUS; SUBCUTANEOUS at 05:35

## 2018-12-10 RX ADMIN — Medication 975 MILLIGRAM(S): at 00:10

## 2018-12-10 RX ADMIN — SENNA PLUS 1 TABLET(S): 8.6 TABLET ORAL at 21:24

## 2018-12-10 RX ADMIN — Medication 975 MILLIGRAM(S): at 06:04

## 2018-12-10 RX ADMIN — Medication 62.5 MILLIMOLE(S): at 12:11

## 2018-12-10 RX ADMIN — HEPARIN SODIUM 5000 UNIT(S): 5000 INJECTION INTRAVENOUS; SUBCUTANEOUS at 21:25

## 2018-12-10 RX ADMIN — Medication 975 MILLIGRAM(S): at 11:04

## 2018-12-10 NOTE — PROGRESS NOTE ADULT - SUBJECTIVE AND OBJECTIVE BOX
Interval Events: Patient seen and examined. No acute events overnight.   Pt had been tolerating PO well  Pain is well controlled  + gas and bms   NPO for IR tube check today      MEDICATIONS  (STANDING):  acetaminophen   Tablet .. 975 milliGRAM(s) Oral every 6 hours  dextrose 5% + sodium chloride 0.45%. 1000 milliLiter(s) (100 mL/Hr) IV Continuous <Continuous>  docusate sodium 100 milliGRAM(s) Oral daily  heparin  Injectable 5000 Unit(s) SubCutaneous every 8 hours  HYDROmorphone  Injectable 0.5 milliGRAM(s) IV Push once  meropenem  IVPB 2000 milliGRAM(s) IV Intermittent every 8 hours  senna 1 Tablet(s) Oral daily  sodium phosphate IVPB 15 milliMole(s) IV Intermittent once    MEDICATIONS  (PRN):  oxyCODONE    IR 10 milliGRAM(s) Oral every 6 hours PRN Severe Pain (7 - 10)      Allergies    adhesives (Rash)  cortisone (Rash)    Intolerances        Review of Systems:    General:  No wt loss, fevers, chills, night sweats,fatigue,   Eyes:  Good vision, no reported pain  ENT:  No sore throat, pain, runny nose, dysphagia  CV:  No pain, palpitations, hypo/hypertension  Resp:  No dyspnea, cough, tachypnea, wheezing  GI:  No pain, No nausea, No vomiting, No diarrhea, No constipation, No weight loss, No fever, No pruritis, No rectal bleeding, No melena, No dysphagia  :  No pain, bleeding, incontinence, nocturia  Muscle:  No pain, weakness  Neuro:  No weakness, tingling, memory problems  Psych:  No fatigue, insomnia, mood problems, depression  Endocrine:  No polyuria, polydypsia, cold/heat intolerance  Heme:  No petechiae, ecchymosis, easy bruisability  Skin:  No rash, tattoos, scars, edema      Vital Signs Last 24 Hrs  T(C): 37.2 (10 Dec 2018 05:29), Max: 37.7 (09 Dec 2018 21:22)  T(F): 98.9 (10 Dec 2018 05:29), Max: 99.8 (09 Dec 2018 21:22)  HR: 79 (10 Dec 2018 05:29) (79 - 97)  BP: 132/82 (10 Dec 2018 05:29) (105/65 - 133/80)  BP(mean): --  RR: 18 (10 Dec 2018 05:29) (16 - 18)  SpO2: 98% (10 Dec 2018 05:29) (95% - 98%)    PHYSICAL EXAM:    Constitutional: NAD, well-developed  HEENT: EOMI, throat clear  Neck: No LAD, supple  Respiratory: CTA and P  Cardiovascular: S1 and S2, RRR, no M  Gastrointestinal: soft, NT, + distended, IR drain in place in RLQ and collecting bilious fluid, old lap sites covered with steri strips, old drain site in RLQ healing well  Extremities: No peripheral edema, neg clubbing, cyanosis  Vascular: 2+ peripheral pulses  Neurological: A/O x 3, no focal deficits  Psychiatric: Normal mood, normal affect  Skin: No rashes      LABS:                        12.2   18.6  )-----------( 387      ( 10 Dec 2018 06:09 )             37.8     12-10    134<L>  |  96  |  9   ----------------------------<  110<H>  4.3   |  24  |  0.51    Ca    8.8      10 Dec 2018 06:09  Phos  2.7     12-10  Mg     2.4     12-10    TPro  7.6  /  Alb  3.1<L>  /  TBili  0.6  /  DBili  0.2  /  AST  22  /  ALT  60<H>  /  AlkPhos  199<H>  12-10    PT/INR - ( 10 Dec 2018 06:09 )   PT: 14.2 sec;   INR: 1.24 ratio         PTT - ( 10 Dec 2018 06:09 )  PTT:29.8 sec      RADIOLOGY & ADDITIONAL TESTS:

## 2018-12-10 NOTE — PROGRESS NOTE ADULT - ASSESSMENT
38M PPD4 from US and fluoroscopy guided IR placement of right upper quadrant perihepatic drain, now PPD4 from ERCP for bile leak with sphincterotomy and stent placement.    PLAN:  - Diet: NPO at midnight for IR tube check   - Wean O2 and continue IS  - F/u tube check  - cont abx  - Pain control as needed  - Follow up blood culture speciation  - monitor VS  - dvt ppx, oob/ambulate as tolerated 38M PPD4 from US and fluoroscopy guided IR placement of right upper quadrant perihepatic drain, now PPD4 from ERCP for bile leak with sphincterotomy and stent placement.    PLAN:  - Diet: NPO at midnight for IR tube check   - Wean O2 and continue IS  - F/u tube check  - cont abx  - f/u ID consult for abx plan   - Pain control as needed  - Follow up blood culture speciation  - monitor VS  - dvt ppx, oob/ambulate as tolerated

## 2018-12-10 NOTE — PROGRESS NOTE ADULT - SUBJECTIVE AND OBJECTIVE BOX
38M w/ PMH of CHRISTINE, chronic cholecystitis/cholelithiasis s/p laparascopic cholecystostomy tube placement by Dr. Pritchett in August 2018  sp cholecystectomy c/b bile leak s/p drain on 12/5 s/p sphincterotomy and CBD stent placement referred to IR for drain evaluation. Biloma drain noted to have decreased output. CT scan on 12/8 shows "Mild decrease in right subcapsular collection status post placement of pigtail catheter" .     NPO status:   Anticoagulation: heparin SQ 5000units at 5:00AM.   Antibiotics: Meropenem 5: 00AM.      Allergies:adhesives (Rash)  cortisone (Rash)      PAST MEDICAL & SURGICAL HISTORY:  Prediabetes  GERD (gastroesophageal reflux disease): no medications  Scoliosis  CHRISTINE (obstructive sleep apnea): no sleep studies done but had witnessed apnea by his brother  Calculus of gallbladder with cholecystitis without biliary obstruction, unspecified cholecystitis acuity  S/P laparoscopic cholecystectomy: 12/03/18  H/O abdominal surgery: insertion of biliary drain 2018        Pertinent labs:                      12.2   18.6  )-----------( 387      ( 10 Dec 2018 06:09 )             37.8   12-10    134<L>  |  96  |  9   ----------------------------<  110<H>  4.3   |  24  |  0.51    Ca    8.8      10 Dec 2018 06:09  Phos  2.7     12-10  Mg     2.4     12-10    TPro  7.6  /  Alb  3.1<L>  /  TBili  0.6  /  DBili  0.2  /  AST  22  /  ALT  60<H>  /  AlkPhos  199<H>  12-10  PT/INR - ( 10 Dec 2018 06:09 )   PT: 14.2 sec;   INR: 1.24 ratio         PTT - ( 10 Dec 2018 06:09 )  PTT:29.8 sec    Consent: Procedure/risks/ Benefits explained. Informed consent obtained. Pt verbalizes understanding.

## 2018-12-10 NOTE — PROGRESS NOTE ADULT - ATTENDING COMMENTS
AS above, persistent subcapsular collection will upsize and reposition catheter.  Consent obtained and reinforced.  This encounter took place prior to the procedure.

## 2018-12-10 NOTE — PROGRESS NOTE ADULT - SUBJECTIVE AND OBJECTIVE BOX
Interventional Radiology Procedure Note    Procedure:  Abdominal tube check and exchange, upsizing and repositioning    Indication:  Subcapsular hepatic collection    Operators:  Mary    Anesthesia (type):  IV sedation    Contrast:  14    EBL:  none    Findings/Follow up Plan of Care:  Tube check demonstrates loculation of collection around drainage catheter with no fistula identified.  Tube exchanged over guidewire and repositioned to more cranial aspect of collection replaced with a 12 [place] biliary catheter.  approx 15cc of serosanguinous fluid obtained.  Full report to follow.    Specimens Removed: none    Implants: 12 [place] drain    Complications: none    Condition/Disposition:  PACU then floors.    Please call Interventional Radiology x [phone] with any questions, concerns, or issues.

## 2018-12-10 NOTE — CHART NOTE - NSCHARTNOTEFT_GEN_A_CORE
Post Operative Note      Procedure: Abdominal tube check and exchange, upsizing and repositioning with IR    Subjective: Patient seen and examined.  Reports minimal pain.  Denies fevers, chills, nausea, vomiting.  No acute events since procedure.    Objective:    T(C): 37.2 (12-10-18 @ 16:50), Max: 37.7 (12-09-18 @ 21:22)  HR: 80 (12-10-18 @ 16:50) (79 - 97)  BP: 131/83 (12-10-18 @ 16:50) (105/65 - 133/79)  RR: 18 (12-10-18 @ 16:50) (16 - 18)  SpO2: 96% (12-10-18 @ 16:50) (95% - 98%)      12-09-18 @ 07:01  -  12-10-18 @ 07:00  --------------------------------------------------------  IN: 2390 mL / OUT: 1355 mL / NET: 1035 mL    12-10-18 @ 07:01  -  12-10-18 @ 17:01  --------------------------------------------------------  IN: 0 mL / OUT: 400 mL / NET: -400 mL        Physical Exam:  Gen: AAOx3, NAD  Abdomen: distended, soft, nontender  Right REMI with ss output     Assessment/Plan: Post Operative Note      Procedure: Abdominal tube check and exchange, upsizing and repositioning with IR    Subjective: Patient seen and examined.  Reports minimal pain.  Denies fevers, chills, nausea, vomiting.  No acute events since procedure.    Objective:    T(C): 37.2 (12-10-18 @ 16:50), Max: 37.7 (12-09-18 @ 21:22)  HR: 80 (12-10-18 @ 16:50) (79 - 97)  BP: 131/83 (12-10-18 @ 16:50) (105/65 - 133/79)  RR: 18 (12-10-18 @ 16:50) (16 - 18)  SpO2: 96% (12-10-18 @ 16:50) (95% - 98%)      12-09-18 @ 07:01  -  12-10-18 @ 07:00  --------------------------------------------------------  IN: 2390 mL / OUT: 1355 mL / NET: 1035 mL    12-10-18 @ 07:01  -  12-10-18 @ 17:01  --------------------------------------------------------  IN: 0 mL / OUT: 400 mL / NET: -400 mL        Physical Exam:  Gen: AAOx3, NAD  Resp: nonlabored on RA  Abdomen: distended, soft, nontender  Right REMI with ss output     Assessment/Plan: 38M PPD4 from US and fluoroscopy guided IR placement of right upper quadrant perihepatic drain, PPD4 from ERCP for bile leak with sphincterotomy and stent placement, now 4 hours s/p Abdominal tube check and exchange, upsizing and repositioning with IR.    - Regular diet   - Pain control prn  - cont abx  - Follow up blood cx  - monitor VS, Uop  - monitor drain op  - dvt ppx, oob/ambulate as tolerated

## 2018-12-10 NOTE — PROGRESS NOTE ADULT - SUBJECTIVE AND OBJECTIVE BOX
Red Team Surgery Progress Note    SUBJECTIVE: Patient seen and examined at the bedside. No acute events overnight. CT AP over the weekend revealed tube in position with decreasing size of collection.  IR tube check planned for this morning. Has been NPO for the procedure but had been tolerating a regular, low fat diet without nausea or vomiting previously. Pain is controlled with analgesics. Has passed flatus and has had a bowel movement. Ambulating well.     VITALS  T(C): 37.2 (12-10-18 @ 05:29), Max: 37.7 (12-09-18 @ 21:22)  HR: 79 (12-10-18 @ 05:29) (65 - 97)  BP: 132/82 (12-10-18 @ 05:29) (105/65 - 133/80)  RR: 18 (12-10-18 @ 05:29) (16 - 18)  SpO2: 98% (12-10-18 @ 05:29) (95% - 98%)    Is/Os    12-08 @ 07:01  -  12-09 @ 07:00  --------------------------------------------------------  IN:    IV PiggyBack: 400 mL    Oral Fluid: 720 mL  Total IN: 1120 mL    OUT:    Voided: 1450 mL  Total OUT: 1450 mL    Total NET: -330 mL      12-09 @ 07:01  -  12-10 @ 06:57  --------------------------------------------------------  IN:    dextrose 5% + sodium chloride 0.45%.: 800 mL    IV PiggyBack: 350 mL    Oral Fluid: 1240 mL  Total IN: 2390 mL    OUT:    Drain: 105 mL    Voided: 1250 mL  Total OUT: 1355 mL    Total NET: 1035 mL    PHYSICAL EXAM:   General: NAD, Lying in bed comfortably, alert, oriented x3  Pulm: Non-labored breathing  GI/Abd: Distended, soft, NT, no rebound/guarding, IR drain in place in RLQ and collecting bilious fluid, old lap sites covered with steri strips, old drain site in RLQ healing well    MEDICATIONS (STANDING): acetaminophen   Tablet .. 975 milliGRAM(s) Oral every 6 hours  dextrose 5% + sodium chloride 0.45%. 1000 milliLiter(s) IV Continuous <Continuous>  docusate sodium 100 milliGRAM(s) Oral daily  heparin  Injectable 5000 Unit(s) SubCutaneous every 8 hours  HYDROmorphone  Injectable 0.5 milliGRAM(s) IV Push once  meropenem  IVPB 2000 milliGRAM(s) IV Intermittent every 8 hours  senna 1 Tablet(s) Oral daily    MEDICATIONS (PRN):oxyCODONE    IR 10 milliGRAM(s) Oral every 6 hours PRN Severe Pain (7 - 10)    LABS  CBC (12-10 @ 06:09)                              12.2<L>                         18.6<H>  )----------------(  387        --    % Neutrophils, --    % Lymphocytes, ANC: --                                  37.8<L>  CBC (12-09 @ 07:26)                              11.8<L>                         19.2<H>  )----------------(  322        --    % Neutrophils, --    % Lymphocytes, ANC: --                                  36.0<L>    BMP (12-10 @ 06:09)             134<L>  |  96      |  9     		Ca++ --      Ca 8.8                ---------------------------------( 110<H>		Mg 2.4                4.3     |  24      |  0.51  			Ph 2.7     BMP (12-09 @ 07:27)             133<L>  |  97      |  9     		Ca++ --      Ca 8.7                ---------------------------------( 76    		Mg 2.4                4.0     |  23      |  0.48<L>			Ph 2.8       LFTs (12-10 @ 06:09)      TPro 7.6 / Alb 3.1<L> / TBili 0.6 / DBili 0.2 / AST 22 / ALT 60<H> / AlkPhos 199<H>  LFTs (12-09 @ 07:27)      TPro 7.4 / Alb 3.1<L> / TBili 0.7 / DBili 0.3<H> / AST 42<H> / ALT 84<H> / AlkPhos 209<H>    Coags (12-10 @ 06:09)  aPTT 29.8 / INR 1.24<H> / PT 14.2<H>

## 2018-12-11 ENCOUNTER — OTHER (OUTPATIENT)
Age: 38
End: 2018-12-11

## 2018-12-11 LAB
ALBUMIN SERPL ELPH-MCNC: 3.3 G/DL — SIGNIFICANT CHANGE UP (ref 3.3–5)
ALP SERPL-CCNC: 203 U/L — HIGH (ref 40–120)
ALT FLD-CCNC: 49 U/L — HIGH (ref 10–45)
ANION GAP SERPL CALC-SCNC: 12 MMOL/L — SIGNIFICANT CHANGE UP (ref 5–17)
AST SERPL-CCNC: 16 U/L — SIGNIFICANT CHANGE UP (ref 10–40)
BILIRUB DIRECT SERPL-MCNC: 0.2 MG/DL — SIGNIFICANT CHANGE UP (ref 0–0.2)
BILIRUB INDIRECT FLD-MCNC: 0.3 MG/DL — SIGNIFICANT CHANGE UP (ref 0.2–1)
BILIRUB SERPL-MCNC: 0.5 MG/DL — SIGNIFICANT CHANGE UP (ref 0.2–1.2)
BUN SERPL-MCNC: 7 MG/DL — SIGNIFICANT CHANGE UP (ref 7–23)
CALCIUM SERPL-MCNC: 9 MG/DL — SIGNIFICANT CHANGE UP (ref 8.4–10.5)
CHLORIDE SERPL-SCNC: 97 MMOL/L — SIGNIFICANT CHANGE UP (ref 96–108)
CO2 SERPL-SCNC: 27 MMOL/L — SIGNIFICANT CHANGE UP (ref 22–31)
CREAT SERPL-MCNC: 0.56 MG/DL — SIGNIFICANT CHANGE UP (ref 0.5–1.3)
CULTURE RESULTS: SIGNIFICANT CHANGE UP
GLUCOSE SERPL-MCNC: 112 MG/DL — HIGH (ref 70–99)
HCT VFR BLD CALC: 41.1 % — SIGNIFICANT CHANGE UP (ref 39–50)
HGB BLD-MCNC: 13.2 G/DL — SIGNIFICANT CHANGE UP (ref 13–17)
MAGNESIUM SERPL-MCNC: 2.4 MG/DL — SIGNIFICANT CHANGE UP (ref 1.6–2.6)
MCHC RBC-ENTMCNC: 26.6 PG — LOW (ref 27–34)
MCHC RBC-ENTMCNC: 32.1 GM/DL — SIGNIFICANT CHANGE UP (ref 32–36)
MCV RBC AUTO: 82.9 FL — SIGNIFICANT CHANGE UP (ref 80–100)
ORGANISM # SPEC MICROSCOPIC CNT: SIGNIFICANT CHANGE UP
PHOSPHATE SERPL-MCNC: 2.9 MG/DL — SIGNIFICANT CHANGE UP (ref 2.5–4.5)
PLATELET # BLD AUTO: 458 K/UL — HIGH (ref 150–400)
POTASSIUM SERPL-MCNC: 4.7 MMOL/L — SIGNIFICANT CHANGE UP (ref 3.5–5.3)
POTASSIUM SERPL-SCNC: 4.7 MMOL/L — SIGNIFICANT CHANGE UP (ref 3.5–5.3)
PROT SERPL-MCNC: 8.1 G/DL — SIGNIFICANT CHANGE UP (ref 6–8.3)
RBC # BLD: 4.95 M/UL — SIGNIFICANT CHANGE UP (ref 4.2–5.8)
RBC # FLD: 13.6 % — SIGNIFICANT CHANGE UP (ref 10.3–14.5)
SODIUM SERPL-SCNC: 136 MMOL/L — SIGNIFICANT CHANGE UP (ref 135–145)
SPECIMEN SOURCE: SIGNIFICANT CHANGE UP
WBC # BLD: 15.5 K/UL — HIGH (ref 3.8–10.5)
WBC # FLD AUTO: 15.5 K/UL — HIGH (ref 3.8–10.5)

## 2018-12-11 PROCEDURE — 99231 SBSQ HOSP IP/OBS SF/LOW 25: CPT

## 2018-12-11 RX ORDER — CIPROFLOXACIN LACTATE 400MG/40ML
VIAL (ML) INTRAVENOUS
Qty: 0 | Refills: 0 | Status: DISCONTINUED | OUTPATIENT
Start: 2018-12-11 | End: 2018-12-12

## 2018-12-11 RX ORDER — CIPROFLOXACIN LACTATE 400MG/40ML
400 VIAL (ML) INTRAVENOUS ONCE
Qty: 0 | Refills: 0 | Status: COMPLETED | OUTPATIENT
Start: 2018-12-11 | End: 2018-12-11

## 2018-12-11 RX ORDER — OXYCODONE HYDROCHLORIDE 5 MG/1
5 TABLET ORAL EVERY 6 HOURS
Qty: 0 | Refills: 0 | Status: DISCONTINUED | OUTPATIENT
Start: 2018-12-11 | End: 2018-12-17

## 2018-12-11 RX ORDER — CIPROFLOXACIN LACTATE 400MG/40ML
400 VIAL (ML) INTRAVENOUS EVERY 12 HOURS
Qty: 0 | Refills: 0 | Status: DISCONTINUED | OUTPATIENT
Start: 2018-12-11 | End: 2018-12-12

## 2018-12-11 RX ADMIN — HEPARIN SODIUM 5000 UNIT(S): 5000 INJECTION INTRAVENOUS; SUBCUTANEOUS at 06:27

## 2018-12-11 RX ADMIN — Medication 100 MILLIGRAM(S): at 22:43

## 2018-12-11 RX ADMIN — Medication 975 MILLIGRAM(S): at 06:27

## 2018-12-11 RX ADMIN — HEPARIN SODIUM 5000 UNIT(S): 5000 INJECTION INTRAVENOUS; SUBCUTANEOUS at 13:06

## 2018-12-11 RX ADMIN — HEPARIN SODIUM 5000 UNIT(S): 5000 INJECTION INTRAVENOUS; SUBCUTANEOUS at 22:43

## 2018-12-11 RX ADMIN — Medication 200 MILLIGRAM(S): at 12:53

## 2018-12-11 RX ADMIN — MEROPENEM 200 MILLIGRAM(S): 1 INJECTION INTRAVENOUS at 06:32

## 2018-12-11 RX ADMIN — Medication 975 MILLIGRAM(S): at 06:57

## 2018-12-11 RX ADMIN — Medication 975 MILLIGRAM(S): at 12:53

## 2018-12-11 RX ADMIN — Medication 975 MILLIGRAM(S): at 00:22

## 2018-12-11 RX ADMIN — SENNA PLUS 1 TABLET(S): 8.6 TABLET ORAL at 22:43

## 2018-12-11 RX ADMIN — Medication 975 MILLIGRAM(S): at 18:18

## 2018-12-11 RX ADMIN — Medication 975 MILLIGRAM(S): at 00:52

## 2018-12-11 NOTE — DIETITIAN INITIAL EVALUATION ADULT. - ENERGY NEEDS
Ht.  65"     Wt.   108.4 Kg      BMI  39.8kg/m2  IBW  61.8 Kg      Pt is at  175  %  IBW.   No edema noted, no pressure injuries noted per nursing flowsheet.  Pt admitted with abdominal pain, bile leak and drainage. PMH: CHRISTINE, chronic cholecystitis, cholecystectomy, GERD, prediabetes.

## 2018-12-11 NOTE — DIETITIAN INITIAL EVALUATION ADULT. - OTHER INFO
Pt seen as length of stay on 9MONTI. PTA pt was eating well but reported constant GI upset and frequent diarrhea 20 mn after eating. Upon admission, pt is eating less, c/o feeling full after a few bites.  C/o dizziness when he stands but no nausea/constipation/diarrhea/vomiting/difficulty chewing or swallowing. Pt has not noticed any wt loss PTA.

## 2018-12-11 NOTE — PROGRESS NOTE ADULT - ASSESSMENT
38M PPD4 from US and fluoroscopy guided IR placement of right upper quadrant perihepatic drain, PPD4 from ERCP for bile leak with sphincterotomy and stent placement, now PPD1 s/p Abdominal tube check and exchange, upsizing and repositioning with IR.    - Regular diet   - Pain control prn  - Switch abx to cipro   - Follow up blood cx  - monitor VS, Uop  - monitor drain op  - dvt ppx, oob/ambulate as tolerated. 38M PPD4 from US and fluoroscopy guided IR placement of right upper quadrant perihepatic drain, PPD4 from ERCP for bile leak with sphincterotomy and stent placement, now PPD1 s/p Abdominal tube check and exchange, upsizing and repositioning with IR.    - Regular diet   - Pain control prn  - Switch abx to cipro   - Follow up blood cx  - monitor VS, Uop  - monitor drain op  - dvt ppx, oob/ambulate as tolerated.  - dc planning for lyn with outpt f/u with primary surgeon Dr Santino Pritchett, IR and GI.

## 2018-12-11 NOTE — PROGRESS NOTE ADULT - SUBJECTIVE AND OBJECTIVE BOX
Interval Events:  s/p Abdominal tube check and exchange, upsizing and repositioning with IR yesterday    pt seen and examined. Overall feeling better, SOB has improved  Pt denies abdominal pain, n/v  + flatus and bm  poor appetite     MEDICATIONS  (STANDING):  acetaminophen   Tablet .. 975 milliGRAM(s) Oral every 6 hours  docusate sodium 100 milliGRAM(s) Oral daily  heparin  Injectable 5000 Unit(s) SubCutaneous every 8 hours  meropenem  IVPB 2000 milliGRAM(s) IV Intermittent every 8 hours  senna 1 Tablet(s) Oral daily    MEDICATIONS  (PRN):  oxyCODONE    IR 5 milliGRAM(s) Oral every 6 hours PRN Moderate Pain (4 - 6)  oxyCODONE    IR 10 milliGRAM(s) Oral every 6 hours PRN Severe Pain (7 - 10)      Allergies    adhesives (Rash)  cortisone (Rash)    Intolerances        Review of Systems:    General:  No wt loss, fevers, chills, night sweats,fatigue,   Eyes:  Good vision, no reported pain  ENT:  No sore throat, pain, runny nose, dysphagia  CV:  No pain, palpitations, hypo/hypertension  Resp:  No dyspnea, cough, tachypnea, wheezing  GI:  No pain, No nausea, No vomiting, No diarrhea, No constipation, No weight loss, No fever, No pruritis, No rectal bleeding, No melena, No dysphagia  :  No pain, bleeding, incontinence, nocturia  Muscle:  No pain, weakness  Neuro:  No weakness, tingling, memory problems  Psych:  No fatigue, insomnia, mood problems, depression  Endocrine:  No polyuria, polydypsia, cold/heat intolerance  Heme:  No petechiae, ecchymosis, easy bruisability  Skin:  No rash, tattoos, scars, edema      Vital Signs Last 24 Hrs  T(C): 37.2 (11 Dec 2018 05:25), Max: 37.7 (10 Dec 2018 21:04)  T(F): 98.9 (11 Dec 2018 05:25), Max: 99.9 (10 Dec 2018 21:04)  HR: 94 (11 Dec 2018 05:25) (80 - 99)  BP: 121/75 (11 Dec 2018 05:25) (119/73 - 133/79)  BP(mean): --  RR: 18 (11 Dec 2018 05:25) (18 - 18)  SpO2: 97% (11 Dec 2018 05:25) (94% - 97%)    PHYSICAL EXAM:    Constitutional: NAD, well-developed  HEENT: EOMI, throat clear  Neck: No LAD, supple  Respiratory: CTA and P  Cardiovascular: S1 and S2, RRR, no M  Gastrointestinal: soft, NT, mildly distended, IR drain in place in RLQ and collecting bilious fluid, old lap sites covered with steri strips, old drain site in RLQ healing well  Extremities: No peripheral edema, neg clubbing, cyanosis  Vascular: 2+ peripheral pulses  Neurological: A/O x 3, no focal deficits  Psychiatric: Normal mood, normal affect  Skin: No rashes      LABS:                        13.2   15.5  )-----------( 458      ( 11 Dec 2018 05:57 )             41.1     12-11    136  |  97  |  7   ----------------------------<  112<H>  4.7   |  27  |  0.56    Ca    9.0      11 Dec 2018 05:57  Phos  2.9     12-11  Mg     2.4     12-11    TPro  8.1  /  Alb  3.3  /  TBili  0.5  /  DBili  0.2  /  AST  16  /  ALT  49<H>  /  AlkPhos  203<H>  12-11    PT/INR - ( 10 Dec 2018 06:09 )   PT: 14.2 sec;   INR: 1.24 ratio         PTT - ( 10 Dec 2018 06:09 )  PTT:29.8 sec      RADIOLOGY & ADDITIONAL TESTS:

## 2018-12-11 NOTE — PROGRESS NOTE ADULT - SUBJECTIVE AND OBJECTIVE BOX
Interventional Radiology Follow- Up Note      This is a 38y Male with PSH of cholecystectomy 12/3 with postoperative bile leak s/p drainage of biloma on 12/5  in Interventional Radiology with Dr. Crews. Pt underwent endoscopy on 12/6 with GI, bile leak found s/p sphincterotomy and covered metal stent placement in CBD. Repeat imaging shows mild decrease in collection and pt subsequently underwent repositioning and upsizing  to 14Fr drainage catheter with Dr. Hernandez on 12/10.     PAST MEDICAL & SURGICAL HISTORY:  Prediabetes  GERD (gastroesophageal reflux disease): no medications  Scoliosis  CHRISTINE (obstructive sleep apnea): no sleep studies done but had witnessed apnea by his brother  Calculus of gallbladder with cholecystitis without biliary obstruction, unspecified cholecystitis acuity  S/P laparoscopic cholecystectomy: 12/03/18  H/O abdominal surgery: insertion of biliary drain 2018      Allergies: adhesives (Rash)  cortisone (Rash)    LABS:                        13.2   15.5  )-----------( 458      ( 11 Dec 2018 05:57 )             41.1     12-11    136  |  97  |  7   ----------------------------<  112<H>  4.7   |  27  |  0.56    Ca    9.0      11 Dec 2018 05:57  Phos  2.9     12-11  Mg     2.4     12-11    TPro  8.1  /  Alb  3.3  /  TBili  0.5  /  DBili  0.2  /  AST  16  /  ALT  49<H>  /  AlkPhos  203<H>  12-11    PT/INR - ( 10 Dec 2018 06:09 )   PT: 14.2 sec;   INR: 1.24 ratio         PTT - ( 10 Dec 2018 06:09 )  PTT:29.8 sec    Vitals: T(F): 98.9 (12-11-18 @ 05:25), Max: 99.9 (12-10-18 @ 21:04)  HR: 94 (12-11-18 @ 05:25) (80 - 99)  BP: 121/75 (12-11-18 @ 05:25) (119/73 - 133/79)  RR: 18 (12-11-18 @ 05:25) (18 - 18)  SpO2: 97% (12-11-18 @ 05:25) (94% - 97%)    PHYSICAL EXAM:  General:   Abd:  60cc    A/P: 38y Male with h/o cholecystectomy 12/3 with postoperative bile leak s/p drainage of biloma on 12/5, s/p sphincterotomy and CBD stent placement on 12/6. He is most recently s/p repositioning and upsizing to 12Fr drain on 12/10 with Dr. Hernandez.   -continue to monitor output    -flush drain per doctor orders  -trend vitals, labs  -Above d/w Dr. Hernandez       Please call IR at extension [phone] with any questions, concerns, or issues regarding above. Interventional Radiology Follow- Up Note      This is a 38y Male with PSH of cholecystectomy 12/3 with postoperative bile leak s/p drainage of biloma on 12/5  in Interventional Radiology with Dr. Crews. Pt underwent endoscopy on 12/6 with GI, bile leak found s/p sphincterotomy and covered metal stent placement in CBD. Repeat imaging shows mild decrease in collection and pt subsequently underwent repositioning and upsizing  to 12Fr drainage catheter with Dr. Hernandez on 12/10.     Pt seen and examined at bedside. Offers no complaints at this time. Pain controlled.     PAST MEDICAL & SURGICAL HISTORY:  Prediabetes  GERD (gastroesophageal reflux disease): no medications  Scoliosis  CHRISTINE (obstructive sleep apnea): no sleep studies done but had witnessed apnea by his brother  Calculus of gallbladder with cholecystitis without biliary obstruction, unspecified cholecystitis acuity  S/P laparoscopic cholecystectomy: 12/03/18  H/O abdominal surgery: insertion of biliary drain 2018      Allergies: adhesives (Rash)  cortisone (Rash)    LABS:                        13.2   15.5  )-----------( 458      ( 11 Dec 2018 05:57 )             41.1     12-11    136  |  97  |  7   ----------------------------<  112<H>  4.7   |  27  |  0.56    Ca    9.0      11 Dec 2018 05:57  Phos  2.9     12-11  Mg     2.4     12-11    TPro  8.1  /  Alb  3.3  /  TBili  0.5  /  DBili  0.2  /  AST  16  /  ALT  49<H>  /  AlkPhos  203<H>  12-11    PT/INR - ( 10 Dec 2018 06:09 )   PT: 14.2 sec;   INR: 1.24 ratio         PTT - ( 10 Dec 2018 06:09 )  PTT:29.8 sec    Vitals: T(F): 98.9 (12-11-18 @ 05:25), Max: 99.9 (12-10-18 @ 21:04)  HR: 94 (12-11-18 @ 05:25) (80 - 99)  BP: 121/75 (12-11-18 @ 05:25) (119/73 - 133/79)  RR: 18 (12-11-18 @ 05:25) (18 - 18)  SpO2: 97% (12-11-18 @ 05:25) (94% - 97%)    PHYSICAL EXAM:  General: NAD, A&Ox3  Abd:  ND, soft, NTTP, RUQ drain intact to REMI with serous output, 60cc/24hr per chart record    A/P: 38y Male with h/o cholecystectomy 12/3 with postoperative bile leak s/p drainage of biloma on 12/5, s/p sphincterotomy and CBD stent placement on 12/6. He is most recently s/p repositioning and upsizing to 12Fr drain on 12/10 with Dr. Hernandez.   -continue to monitor output    -flush drain per doctor orders  -trend vitals, labs  -Above d/w Dr. Hernandez       Please call IR at extension [phone] with any questions, concerns, or issues regarding above.

## 2018-12-11 NOTE — PROGRESS NOTE ADULT - SUBJECTIVE AND OBJECTIVE BOX
S: Patient seen and examined.  No acute events overnight.  Pain controlled.  Has been afebrile and tolerating regular diet after IR drain placement.  Denies nausea/vomiting.      O: Vital Signs  T(C): 37.2 (12-11 @ 05:25), Max: 37.7 (12-10 @ 21:04)  HR: 94 (12-11 @ 05:25) (80 - 99)  BP: 121/75 (12-11 @ 05:25) (119/73 - 133/79)  RR: 18 (12-11 @ 05:25) (18 - 18)  SpO2: 97% (12-11 @ 05:25) (94% - 97%)  12-10-18 @ 07:01  -  12-11-18 @ 07:00  --------------------------------------------------------  IN: 3130 mL / OUT: 1960 mL / NET: 1170 mL      Physical Exam:  Gen: AAOx3, NAD  Resp: nonlabored on RA  Abdomen: distended, soft, nontender  Right REMI with ss output                           13.2   15.5  )-----------( 458      ( 11 Dec 2018 05:57 )             41.1   12-11    136  |  97  |  7   ----------------------------<  112<H>  4.7   |  27  |  0.56    Ca    9.0      11 Dec 2018 05:57  Phos  2.9     12-11  Mg     2.4     12-11    TPro  8.1  /  Alb  3.3  /  TBili  0.5  /  DBili  0.2  /  AST  16  /  ALT  49<H>  /  AlkPhos  203<H>  12-11

## 2018-12-12 LAB
ANION GAP SERPL CALC-SCNC: 15 MMOL/L — SIGNIFICANT CHANGE UP (ref 5–17)
ANION GAP SERPL CALC-SCNC: 15 MMOL/L — SIGNIFICANT CHANGE UP (ref 5–17)
BUN SERPL-MCNC: 12 MG/DL — SIGNIFICANT CHANGE UP (ref 7–23)
BUN SERPL-MCNC: 12 MG/DL — SIGNIFICANT CHANGE UP (ref 7–23)
CALCIUM SERPL-MCNC: 9 MG/DL — SIGNIFICANT CHANGE UP (ref 8.4–10.5)
CALCIUM SERPL-MCNC: 9.3 MG/DL — SIGNIFICANT CHANGE UP (ref 8.4–10.5)
CHLORIDE SERPL-SCNC: 95 MMOL/L — LOW (ref 96–108)
CHLORIDE SERPL-SCNC: 95 MMOL/L — LOW (ref 96–108)
CO2 SERPL-SCNC: 23 MMOL/L — SIGNIFICANT CHANGE UP (ref 22–31)
CO2 SERPL-SCNC: 24 MMOL/L — SIGNIFICANT CHANGE UP (ref 22–31)
CREAT SERPL-MCNC: 0.44 MG/DL — LOW (ref 0.5–1.3)
CREAT SERPL-MCNC: 0.51 MG/DL — SIGNIFICANT CHANGE UP (ref 0.5–1.3)
GLUCOSE SERPL-MCNC: 109 MG/DL — HIGH (ref 70–99)
GLUCOSE SERPL-MCNC: 86 MG/DL — SIGNIFICANT CHANGE UP (ref 70–99)
HCT VFR BLD CALC: 39.6 % — SIGNIFICANT CHANGE UP (ref 39–50)
HCT VFR BLD CALC: 41.3 % — SIGNIFICANT CHANGE UP (ref 39–50)
HGB BLD-MCNC: 12.7 G/DL — LOW (ref 13–17)
HGB BLD-MCNC: 13.3 G/DL — SIGNIFICANT CHANGE UP (ref 13–17)
MAGNESIUM SERPL-MCNC: 2.2 MG/DL — SIGNIFICANT CHANGE UP (ref 1.6–2.6)
MCHC RBC-ENTMCNC: 26.7 PG — LOW (ref 27–34)
MCHC RBC-ENTMCNC: 26.7 PG — LOW (ref 27–34)
MCHC RBC-ENTMCNC: 32 GM/DL — SIGNIFICANT CHANGE UP (ref 32–36)
MCHC RBC-ENTMCNC: 32.3 GM/DL — SIGNIFICANT CHANGE UP (ref 32–36)
MCV RBC AUTO: 82.6 FL — SIGNIFICANT CHANGE UP (ref 80–100)
MCV RBC AUTO: 83.6 FL — SIGNIFICANT CHANGE UP (ref 80–100)
PHOSPHATE SERPL-MCNC: 2.9 MG/DL — SIGNIFICANT CHANGE UP (ref 2.5–4.5)
PLATELET # BLD AUTO: 413 K/UL — HIGH (ref 150–400)
PLATELET # BLD AUTO: 514 K/UL — HIGH (ref 150–400)
POTASSIUM SERPL-MCNC: 4.2 MMOL/L — SIGNIFICANT CHANGE UP (ref 3.5–5.3)
POTASSIUM SERPL-MCNC: 4.5 MMOL/L — SIGNIFICANT CHANGE UP (ref 3.5–5.3)
POTASSIUM SERPL-SCNC: 4.2 MMOL/L — SIGNIFICANT CHANGE UP (ref 3.5–5.3)
POTASSIUM SERPL-SCNC: 4.5 MMOL/L — SIGNIFICANT CHANGE UP (ref 3.5–5.3)
RBC # BLD: 4.73 M/UL — SIGNIFICANT CHANGE UP (ref 4.2–5.8)
RBC # BLD: 5 M/UL — SIGNIFICANT CHANGE UP (ref 4.2–5.8)
RBC # FLD: 13.6 % — SIGNIFICANT CHANGE UP (ref 10.3–14.5)
RBC # FLD: 13.9 % — SIGNIFICANT CHANGE UP (ref 10.3–14.5)
SODIUM SERPL-SCNC: 133 MMOL/L — LOW (ref 135–145)
SODIUM SERPL-SCNC: 134 MMOL/L — LOW (ref 135–145)
WBC # BLD: 17.9 K/UL — HIGH (ref 3.8–10.5)
WBC # BLD: 18.3 K/UL — HIGH (ref 3.8–10.5)
WBC # FLD AUTO: 17.9 K/UL — HIGH (ref 3.8–10.5)
WBC # FLD AUTO: 18.3 K/UL — HIGH (ref 3.8–10.5)

## 2018-12-12 PROCEDURE — 71045 X-RAY EXAM CHEST 1 VIEW: CPT | Mod: 26

## 2018-12-12 PROCEDURE — 99254 IP/OBS CNSLTJ NEW/EST MOD 60: CPT

## 2018-12-12 RX ORDER — MEROPENEM 1 G/30ML
INJECTION INTRAVENOUS
Qty: 0 | Refills: 0 | Status: DISCONTINUED | OUTPATIENT
Start: 2018-12-12 | End: 2018-12-17

## 2018-12-12 RX ORDER — MEROPENEM 1 G/30ML
1000 INJECTION INTRAVENOUS ONCE
Qty: 0 | Refills: 0 | Status: COMPLETED | OUTPATIENT
Start: 2018-12-12 | End: 2018-12-12

## 2018-12-12 RX ORDER — MEROPENEM 1 G/30ML
1000 INJECTION INTRAVENOUS EVERY 8 HOURS
Qty: 0 | Refills: 0 | Status: DISCONTINUED | OUTPATIENT
Start: 2018-12-12 | End: 2018-12-17

## 2018-12-12 RX ADMIN — HEPARIN SODIUM 5000 UNIT(S): 5000 INJECTION INTRAVENOUS; SUBCUTANEOUS at 13:08

## 2018-12-12 RX ADMIN — HEPARIN SODIUM 5000 UNIT(S): 5000 INJECTION INTRAVENOUS; SUBCUTANEOUS at 22:02

## 2018-12-12 RX ADMIN — MEROPENEM 100 MILLIGRAM(S): 1 INJECTION INTRAVENOUS at 12:25

## 2018-12-12 RX ADMIN — Medication 975 MILLIGRAM(S): at 12:55

## 2018-12-12 RX ADMIN — Medication 975 MILLIGRAM(S): at 17:05

## 2018-12-12 RX ADMIN — Medication 975 MILLIGRAM(S): at 05:19

## 2018-12-12 RX ADMIN — Medication 100 MILLIGRAM(S): at 22:02

## 2018-12-12 RX ADMIN — HEPARIN SODIUM 5000 UNIT(S): 5000 INJECTION INTRAVENOUS; SUBCUTANEOUS at 05:19

## 2018-12-12 RX ADMIN — Medication 200 MILLIGRAM(S): at 00:21

## 2018-12-12 RX ADMIN — Medication 975 MILLIGRAM(S): at 17:35

## 2018-12-12 RX ADMIN — Medication 975 MILLIGRAM(S): at 12:25

## 2018-12-12 RX ADMIN — Medication 975 MILLIGRAM(S): at 22:02

## 2018-12-12 RX ADMIN — SENNA PLUS 1 TABLET(S): 8.6 TABLET ORAL at 22:02

## 2018-12-12 RX ADMIN — MEROPENEM 100 MILLIGRAM(S): 1 INJECTION INTRAVENOUS at 22:03

## 2018-12-12 RX ADMIN — Medication 975 MILLIGRAM(S): at 00:22

## 2018-12-12 RX ADMIN — Medication 975 MILLIGRAM(S): at 22:32

## 2018-12-12 NOTE — PROVIDER CONTACT NOTE (OTHER) - ACTION/TREATMENT ORDERED:
MD aware, coming to bedside to see patient, sepsis work up, blood work, ekg, chest xray, IV tylenol, will continue to monitor
MD made aware. MD will notify IR for tube placement check. Will continue to monitor.
MD Gomez to bedside to assess pt, okay to give tylenol as ordered, cbc & bmp to be sent. Will continue to monitor

## 2018-12-12 NOTE — PROGRESS NOTE ADULT - SUBJECTIVE AND OBJECTIVE BOX
S: Patient seen and examined.  Spiked a 38.0 fever overnight.  Fever workup sent.  CBC sent, revealed WBC 18.  Patient states he still has some abdominal pain but denies any current SOB, nausea, vomiting.    Vital Signs Last 24 Hrs  T(C): 37.1 (12 Dec 2018 05:18), Max: 38 (11 Dec 2018 23:54)  T(F): 98.8 (12 Dec 2018 05:18), Max: 100.4 (11 Dec 2018 23:54)  HR: 87 (12 Dec 2018 05:18) (87 - 105)  BP: 126/70 (12 Dec 2018 05:18) (115/69 - 131/84)  BP(mean): --  RR: 18 (12 Dec 2018 05:18) (18 - 20)  SpO2: 96% (12 Dec 2018 05:18) (95% - 97%)    I&O's Detail    11 Dec 2018 07:01  -  12 Dec 2018 07:00  --------------------------------------------------------  IN:    Oral Fluid: 800 mL  Total IN: 800 mL    OUT:    Drain: 15 mL    Voided: 1900 mL  Total OUT: 1915 mL    Total NET: -1115 mL      Physical Exam:  Gen: AAOx3, NAD  Resp: nonlabored on RA  Abdomen: distended, soft, nontender  Right REMI with ss output            LABS:                        12.7   17.9  )-----------( 413      ( 12 Dec 2018 06:42 )             39.6     12-12    133<L>  |  95<L>  |  12  ----------------------------<  86  4.2   |  23  |  0.44<L>    Ca    9.0      12 Dec 2018 06:42  Phos  2.9     12-12  Mg     2.2     12-12    TPro  8.1  /  Alb  3.3  /  TBili  0.5  /  DBili  0.2  /  AST  16  /  ALT  49<H>  /  AlkPhos  203<H>  12-11

## 2018-12-12 NOTE — PROGRESS NOTE ADULT - SUBJECTIVE AND OBJECTIVE BOX
Interval Events: pt seen and examined. Overall feeling better, SOB has improved  Pt denies abdominal pain, n/v  + flatus and bm  poor appetite   + febrile overnight     MEDICATIONS  (STANDING):  acetaminophen   Tablet .. 975 milliGRAM(s) Oral every 6 hours  docusate sodium 100 milliGRAM(s) Oral daily  heparin  Injectable 5000 Unit(s) SubCutaneous every 8 hours  meropenem  IVPB      meropenem  IVPB 1000 milliGRAM(s) IV Intermittent every 8 hours  senna 1 Tablet(s) Oral daily    MEDICATIONS  (PRN):  oxyCODONE    IR 10 milliGRAM(s) Oral every 6 hours PRN Severe Pain (7 - 10)  oxyCODONE    IR 5 milliGRAM(s) Oral every 6 hours PRN Moderate Pain (4 - 6)      Allergies    adhesives (Rash)  cortisone (Rash)    Intolerances        Review of Systems:    General:  No wt loss, fevers, chills, night sweats,fatigue,   Eyes:  Good vision, no reported pain  ENT:  No sore throat, pain, runny nose, dysphagia  CV:  No pain, palpitations, hypo/hypertension  Resp:  No dyspnea, cough, tachypnea, wheezing  GI:  No pain, No nausea, No vomiting, No diarrhea, No constipation, No weight loss, No fever, No pruritis, No rectal bleeding, No melena, No dysphagia  :  No pain, bleeding, incontinence, nocturia  Muscle:  No pain, weakness  Neuro:  No weakness, tingling, memory problems  Psych:  No fatigue, insomnia, mood problems, depression  Endocrine:  No polyuria, polydypsia, cold/heat intolerance  Heme:  No petechiae, ecchymosis, easy bruisability  Skin:  No rash, tattoos, scars, edema      Vital Signs Last 24 Hrs  T(C): 36.9 (12 Dec 2018 10:30), Max: 38 (11 Dec 2018 23:54)  T(F): 98.5 (12 Dec 2018 10:30), Max: 100.4 (11 Dec 2018 23:54)  HR: 98 (12 Dec 2018 10:30) (87 - 105)  BP: 130/80 (12 Dec 2018 10:30) (115/69 - 131/84)  BP(mean): --  RR: 16 (12 Dec 2018 10:30) (16 - 20)  SpO2: 95% (12 Dec 2018 10:30) (95% - 97%)    PHYSICAL EXAM:    Constitutional: NAD, well-developed  HEENT: EOMI, throat clear  Neck: No LAD, supple  Respiratory: CTA and P  Cardiovascular: S1 and S2, RRR, no M  Gastrointestinal: soft, NT, mildly distended, IR drain in place in RLQ and collecting bilious fluid, old lap sites covered with steri strips, old drain site in RLQ healing well  Extremities: No peripheral edema, neg clubbing, cyanosis  Vascular: 2+ peripheral pulses  Neurological: A/O x 3, no focal deficits  Psychiatric: Normal mood, normal affect  Skin: No rashes      LABS:                        12.7   17.9  )-----------( 413      ( 12 Dec 2018 06:42 )             39.6     12-12    133<L>  |  95<L>  |  12  ----------------------------<  86  4.2   |  23  |  0.44<L>    Ca    9.0      12 Dec 2018 06:42  Phos  2.9     12-12  Mg     2.2     12-12    TPro  8.1  /  Alb  3.3  /  TBili  0.5  /  DBili  0.2  /  AST  16  /  ALT  49<H>  /  AlkPhos  203<H>  12-11          RADIOLOGY & ADDITIONAL TESTS:

## 2018-12-12 NOTE — PROVIDER CONTACT NOTE (OTHER) - ASSESSMENT
pt called in RN for temp to be assessed, pt states "I feel feverish" pt denies cp, sob, difficulty breathing. pain rating is unchanged from throughout the day, abd remains softly distended, abd steris intact, RUQ REMI intact with small output. vs read temp 100.4 oral, , all other vss. pt seems anxious and worried about not being well enough to be d/c home as planned.

## 2018-12-12 NOTE — PROGRESS NOTE ADULT - PROBLEM SELECTOR PROBLEM 2
ACP (advance care planning)

## 2018-12-12 NOTE — PROGRESS NOTE ADULT - ASSESSMENT
38M PPD4 from US and fluoroscopy guided IR placement of right upper quadrant perihepatic drain, PPD4 from ERCP for bile leak with sphincterotomy and stent placement, now PPD2 s/p Abdominal tube check and exchange, upsizing and repositioning with IR.    - Regular diet   - Pain control prn  - Continue cipro   - ID consult  - Follow up blood cx, fever workup  - monitor VS, Uop  - monitor drain op  - dvt ppx, oob/ambulate as tolerated.  - dc planning -outpt f/u with primary surgeon Dr Santino Pritchett, IR and GI. 38M PPD4 from US and fluoroscopy guided IR placement of right upper quadrant perihepatic drain, PPD4 from ERCP for bile leak with sphincterotomy and stent placement, now PPD2 s/p Abdominal tube check and exchange, upsizing and repositioning with IR.    - Regular diet   - Pain control prn  - ID consult to assist with abx management  - Follow up blood cx, fever workup  - monitor VS, Uop  - monitor drain op  - dvt ppx, oob/ambulate as tolerated.  - dc planning when wbc improved - outpt f/u with primary surgeon Dr Santino Pritchett, IR and GI.

## 2018-12-12 NOTE — PROGRESS NOTE ADULT - PROBLEM SELECTOR PLAN 2
Advanced care planning was discussed with patient and family.  Advanced care planning forms were reviewed and discussed.  Risks, benefits and alternatives of gastroenterologic procedures were discussed in detail and all questions were answered.    30 minutes spent.

## 2018-12-13 LAB
ALBUMIN SERPL ELPH-MCNC: 3.2 G/DL — LOW (ref 3.3–5)
ALP SERPL-CCNC: 185 U/L — HIGH (ref 40–120)
ALT FLD-CCNC: 31 U/L — SIGNIFICANT CHANGE UP (ref 10–45)
ANION GAP SERPL CALC-SCNC: 13 MMOL/L — SIGNIFICANT CHANGE UP (ref 5–17)
APTT BLD: 33.2 SEC — SIGNIFICANT CHANGE UP (ref 27.5–36.3)
AST SERPL-CCNC: 14 U/L — SIGNIFICANT CHANGE UP (ref 10–40)
BILIRUB DIRECT SERPL-MCNC: 0.1 MG/DL — SIGNIFICANT CHANGE UP (ref 0–0.2)
BILIRUB INDIRECT FLD-MCNC: 0.3 MG/DL — SIGNIFICANT CHANGE UP (ref 0.2–1)
BILIRUB SERPL-MCNC: 0.4 MG/DL — SIGNIFICANT CHANGE UP (ref 0.2–1.2)
BUN SERPL-MCNC: 14 MG/DL — SIGNIFICANT CHANGE UP (ref 7–23)
CALCIUM SERPL-MCNC: 8.8 MG/DL — SIGNIFICANT CHANGE UP (ref 8.4–10.5)
CHLORIDE SERPL-SCNC: 96 MMOL/L — SIGNIFICANT CHANGE UP (ref 96–108)
CO2 SERPL-SCNC: 24 MMOL/L — SIGNIFICANT CHANGE UP (ref 22–31)
CREAT SERPL-MCNC: 0.45 MG/DL — LOW (ref 0.5–1.3)
GLUCOSE SERPL-MCNC: 106 MG/DL — HIGH (ref 70–99)
HCT VFR BLD CALC: 40.6 % — SIGNIFICANT CHANGE UP (ref 39–50)
HGB BLD-MCNC: 12 G/DL — LOW (ref 13–17)
INR BLD: 1.28 RATIO — HIGH (ref 0.88–1.16)
MAGNESIUM SERPL-MCNC: 2.3 MG/DL — SIGNIFICANT CHANGE UP (ref 1.6–2.6)
MCHC RBC-ENTMCNC: 24.4 PG — LOW (ref 27–34)
MCHC RBC-ENTMCNC: 29.6 GM/DL — LOW (ref 32–36)
MCV RBC AUTO: 82.4 FL — SIGNIFICANT CHANGE UP (ref 80–100)
PHOSPHATE SERPL-MCNC: 2.6 MG/DL — SIGNIFICANT CHANGE UP (ref 2.5–4.5)
PLATELET # BLD AUTO: 570 K/UL — HIGH (ref 150–400)
POTASSIUM SERPL-MCNC: 4.2 MMOL/L — SIGNIFICANT CHANGE UP (ref 3.5–5.3)
POTASSIUM SERPL-SCNC: 4.2 MMOL/L — SIGNIFICANT CHANGE UP (ref 3.5–5.3)
PROT SERPL-MCNC: 8.1 G/DL — SIGNIFICANT CHANGE UP (ref 6–8.3)
PROTHROM AB SERPL-ACNC: 14.7 SEC — HIGH (ref 10–12.9)
RBC # BLD: 4.92 M/UL — SIGNIFICANT CHANGE UP (ref 4.2–5.8)
RBC # FLD: 13.5 % — SIGNIFICANT CHANGE UP (ref 10.3–14.5)
SODIUM SERPL-SCNC: 133 MMOL/L — LOW (ref 135–145)
WBC # BLD: 18.3 K/UL — HIGH (ref 3.8–10.5)
WBC # FLD AUTO: 18.3 K/UL — HIGH (ref 3.8–10.5)

## 2018-12-13 PROCEDURE — 74177 CT ABD & PELVIS W/CONTRAST: CPT | Mod: 26

## 2018-12-13 PROCEDURE — 99223 1ST HOSP IP/OBS HIGH 75: CPT

## 2018-12-13 PROCEDURE — 99232 SBSQ HOSP IP/OBS MODERATE 35: CPT

## 2018-12-13 PROCEDURE — 71260 CT THORAX DX C+: CPT | Mod: 26

## 2018-12-13 RX ORDER — IBUPROFEN 200 MG
600 TABLET ORAL ONCE
Qty: 0 | Refills: 0 | Status: COMPLETED | OUTPATIENT
Start: 2018-12-13 | End: 2018-12-13

## 2018-12-13 RX ADMIN — Medication 600 MILLIGRAM(S): at 10:15

## 2018-12-13 RX ADMIN — Medication 100 MILLIGRAM(S): at 21:46

## 2018-12-13 RX ADMIN — MEROPENEM 100 MILLIGRAM(S): 1 INJECTION INTRAVENOUS at 13:17

## 2018-12-13 RX ADMIN — Medication 975 MILLIGRAM(S): at 11:52

## 2018-12-13 RX ADMIN — MEROPENEM 100 MILLIGRAM(S): 1 INJECTION INTRAVENOUS at 21:46

## 2018-12-13 RX ADMIN — SENNA PLUS 1 TABLET(S): 8.6 TABLET ORAL at 21:46

## 2018-12-13 RX ADMIN — HEPARIN SODIUM 5000 UNIT(S): 5000 INJECTION INTRAVENOUS; SUBCUTANEOUS at 13:17

## 2018-12-13 RX ADMIN — Medication 600 MILLIGRAM(S): at 20:35

## 2018-12-13 RX ADMIN — HEPARIN SODIUM 5000 UNIT(S): 5000 INJECTION INTRAVENOUS; SUBCUTANEOUS at 05:08

## 2018-12-13 RX ADMIN — Medication 600 MILLIGRAM(S): at 20:05

## 2018-12-13 RX ADMIN — HEPARIN SODIUM 5000 UNIT(S): 5000 INJECTION INTRAVENOUS; SUBCUTANEOUS at 21:47

## 2018-12-13 RX ADMIN — Medication 975 MILLIGRAM(S): at 05:08

## 2018-12-13 RX ADMIN — Medication 600 MILLIGRAM(S): at 09:43

## 2018-12-13 RX ADMIN — Medication 975 MILLIGRAM(S): at 17:01

## 2018-12-13 RX ADMIN — MEROPENEM 100 MILLIGRAM(S): 1 INJECTION INTRAVENOUS at 05:08

## 2018-12-13 RX ADMIN — Medication 975 MILLIGRAM(S): at 05:38

## 2018-12-13 NOTE — PROGRESS NOTE ADULT - SUBJECTIVE AND OBJECTIVE BOX
Interval Events: Pt seen and examined. Still with low grade temps and pleuritic chest pain  pt denies n/v  tolerating PO  f/u ct a/p and chest today    MEDICATIONS  (STANDING):  acetaminophen   Tablet .. 975 milliGRAM(s) Oral every 6 hours  docusate sodium 100 milliGRAM(s) Oral daily  heparin  Injectable 5000 Unit(s) SubCutaneous every 8 hours  meropenem  IVPB      meropenem  IVPB 1000 milliGRAM(s) IV Intermittent every 8 hours  senna 1 Tablet(s) Oral daily    MEDICATIONS  (PRN):  oxyCODONE    IR 10 milliGRAM(s) Oral every 6 hours PRN Severe Pain (7 - 10)  oxyCODONE    IR 5 milliGRAM(s) Oral every 6 hours PRN Moderate Pain (4 - 6)      Allergies    adhesives (Rash)  cortisone (Rash)    Intolerances        Review of Systems:    General:  No wt loss, fevers, chills, night sweats,fatigue,   Eyes:  Good vision, no reported pain  ENT:  No sore throat, pain, runny nose, dysphagia  CV:  No pain, palpitations, hypo/hypertension  Resp:  No dyspnea, cough, tachypnea, wheezing  GI:  No pain, No nausea, No vomiting, No diarrhea, No constipation, No weight loss, No fever, No pruritis, No rectal bleeding, No melena, No dysphagia  :  No pain, bleeding, incontinence, nocturia  Muscle:  No pain, weakness  Neuro:  No weakness, tingling, memory problems  Psych:  No fatigue, insomnia, mood problems, depression  Endocrine:  No polyuria, polydypsia, cold/heat intolerance  Heme:  No petechiae, ecchymosis, easy bruisability  Skin:  No rash, tattoos, scars, edema      Vital Signs Last 24 Hrs  T(C): 36.7 (13 Dec 2018 10:14), Max: 37.6 (12 Dec 2018 21:14)  T(F): 98.1 (13 Dec 2018 10:14), Max: 99.6 (12 Dec 2018 21:14)  HR: 91 (13 Dec 2018 10:14) (91 - 108)  BP: 111/68 (13 Dec 2018 10:14) (111/68 - 129/73)  BP(mean): --  RR: 18 (13 Dec 2018 10:14) (17 - 18)  SpO2: 95% (13 Dec 2018 10:14) (95% - 97%)    PHYSICAL EXAM:    Constitutional: NAD, well-developed  HEENT: EOMI, throat clear  Neck: No LAD, supple  Respiratory: CTA and P  Cardiovascular: S1 and S2, RRR, no M  Gastrointestinal: soft, NT, mildly distended, IR drain in place in RLQ and collecting bilious fluid, old lap sites covered with steri strips, old drain site in RLQ healing well  Extremities: No peripheral edema, neg clubbing, cyanosis  Vascular: 2+ peripheral pulses  Neurological: A/O x 3, no focal deficits  Psychiatric: Normal mood, normal affect  Skin: No rashes      LABS:                        12.0   18.3  )-----------( 570      ( 13 Dec 2018 07:16 )             40.6     12-13    133<L>  |  96  |  14  ----------------------------<  106<H>  4.2   |  24  |  0.45<L>    Ca    8.8      13 Dec 2018 07:16  Phos  2.6     12-13  Mg     2.3     12-13    TPro  8.1  /  Alb  3.2<L>  /  TBili  0.4  /  DBili  0.1  /  AST  14  /  ALT  31  /  AlkPhos  185<H>  12-13          RADIOLOGY & ADDITIONAL TESTS:

## 2018-12-13 NOTE — PROGRESS NOTE ADULT - SUBJECTIVE AND OBJECTIVE BOX
S: Patient seen and examined. Remains afebrile. Patient states he has some mild like chest pain, but denies any current abdominal pain, SOB, nausea, vomiting.    Vital Signs Last 24 Hrs  T(C): 37.1 (12 Dec 2018 05:18), Max: 38 (11 Dec 2018 23:54)  T(F): 98.8 (12 Dec 2018 05:18), Max: 100.4 (11 Dec 2018 23:54)  HR: 87 (12 Dec 2018 05:18) (87 - 105)  BP: 126/70 (12 Dec 2018 05:18) (115/69 - 131/84)  BP(mean): --  RR: 18 (12 Dec 2018 05:18) (18 - 20)  SpO2: 96% (12 Dec 2018 05:18) (95% - 97%)    I&O's Detail    11 Dec 2018 07:01  -  12 Dec 2018 07:00  --------------------------------------------------------  IN:    Oral Fluid: 800 mL  Total IN: 800 mL    OUT:    Drain: 15 mL    Voided: 1900 mL  Total OUT: 1915 mL    Total NET: -1115 mL      Physical Exam:  Gen: AAOx3, NAD  Resp: nonlabored on RA  Abdomen: distended, soft, nontender  Right REMI with ss output            LABS:                                             12.0   18.3  )-----------( 570      ( 13 Dec 2018 07:16 )             40.6     12-13    133<L>  |  96  |  14  ----------------------------<  106<H>  4.2   |  24  |  0.45<L>    Ca    8.8      13 Dec 2018 07:16  Phos  2.6     12-13  Mg     2.3     12-13    TPro  8.1  /  Alb  3.2<L>  /  TBili  0.4  /  DBili  0.1  /  AST  14  /  ALT  31  /  AlkPhos  185<H>  12-13      Culture - Blood (12.12.18 @ 05:53)    Specimen Source: .Blood Blood    Culture Results:   No growth to date.

## 2018-12-13 NOTE — CONSULT NOTE ADULT - REASON FOR ADMISSION
Transfer from OSH for abdominal pain

## 2018-12-13 NOTE — PROGRESS NOTE ADULT - ATTENDING COMMENTS
pt seen and examined.  wbc still elevated to18K.  CT C/A/P - smallright pleural effusion, undrained collections  IR to redrain/reposition drain in am  cont abx per ID  f/u pulm input

## 2018-12-13 NOTE — PROGRESS NOTE ADULT - SUBJECTIVE AND OBJECTIVE BOX
INFECTIOUS DISEASES FOLLOW UP--Garth Johnson MD  Pager [phone]    This is a follow up note for this  38y Male with  Abdominal pain  The main complaint at present remains R pleuritic chest pain.  Low grade temps  now on meropenem    Further ROS:  GASTROINTESTINAL:  No nausea, vomiting, diarrhea, and lessened abdominal pain  GENITOURINARY:  No dysuria  NEUROLOGIC:  No headache,     Allergies  adhesives (Rash)  cortisone (Rash)    ANTIBIOTICS/RELEVANT:  antimicrobials  meropenem  IVPB      meropenem  IVPB 1000 milliGRAM(s) IV Intermittent every 8 hours    OTHER:  acetaminophen   Tablet .. 975 milliGRAM(s) Oral every 6 hours  docusate sodium 100 milliGRAM(s) Oral daily  heparin  Injectable 5000 Unit(s) SubCutaneous every 8 hours  oxyCODONE    IR 10 milliGRAM(s) Oral every 6 hours PRN  oxyCODONE    IR 5 milliGRAM(s) Oral every 6 hours PRN  senna 1 Tablet(s) Oral daily    Objective:  Vital Signs Last 24 Hrs  T(C): 37.1 (13 Dec 2018 06:25), Max: 37.6 (12 Dec 2018 21:14)  T(F): 98.8 (13 Dec 2018 06:25), Max: 99.6 (12 Dec 2018 21:14)  HR: 96 (13 Dec 2018 06:25) (92 - 108)  BP: 129/73 (13 Dec 2018 06:25) (121/74 - 130/80)  BP(mean): --  RR: 18 (13 Dec 2018 06:25) (16 - 18)  SpO2: 95% (13 Dec 2018 06:25) (95% - 97%)    PHYSICAL EXAM:  Constitutional:no acute distress  Eyes:ROSA, EOMI  Ear/Nose/Throat: no oral lesions, 	  Respiratory:diminished BS R lower 1/2  Cardiovascular: S1S2  Gastrointestinal:soft, (+) BS, no tenderness  Extremities:no e/e/c  No Lymphadenopathy  IV sites not inflammed.    LABS:                        12.0   18.3  )-----------( 570      ( 13 Dec 2018 07:16 )             40.6     12-13    133<L>  |  96  |  14  ----------------------------<  106<H>  4.2   |  24  |  0.45<L>    Ca    8.8      13 Dec 2018 07:16  Phos  2.6     12-13  Mg     2.3     12-13    TPro  8.1  /  Alb  3.2<L>  /  TBili  0.4  /  DBili  0.1  /  AST  14  /  ALT  31  /  AlkPhos  185<H>  12-13    Imp/Rx:  Ongoing leukocytosis.  I'm a bit concerned re: pulm/chest pathology including any collection/emerging empyema of the R chest.    would repeat imaging--- CT c/a/p    I've d/w Dr. Sagastume---will ask pulm to see and request sono of the R chest

## 2018-12-13 NOTE — CONSULT NOTE ADULT - ASSESSMENT
Imp/Rx:    The patient has had a bile leak addressed by ERCP and stenting.  Also with drainage catheter in place.  The tube was changed but not fully drained yet.  He has a R effusion which on imaging is not very large, but with inspiratory symptoms, and exam findings, with ongoing fevers and WBC count elevation, I do wonder if there could be any inflammation/infection of the R chest fluid.    Let's change the abx to meropenem.  DC the cipro.  Follow cultures.  If remains with fever or elevated WBC count in next 36-48 hours would pursue CT chest/abd/pelvis repeat.    I d/w Dr. Chen.
38 M with obesity, CHRISTINE (not on CPAP), GERD, cholelithiasis with chronic cholecystitis requiring a lap patrick tube placement in 8/18, s/p lap patrick on 12/3, who presented on 12/5 with a post op biliary leak s/p ERCP with sphincterotomy/ CBD stent as well as drainage pigtail placement by IR    1. Pleural effusion  - Bedside point of care ultrasound showed a trace right pleural effusion which appears simple. However a large subdiaphragmatic fluid collection was noted. No consolidation pattern was noted in either lung and the lungs were A-line predominant through out indicating a normal aeration pattern, except the R base which showed evidence of some atelectasis.   - No role for thoracentesis at this time   - Leukocytosis may be related to persistent subdiaphragmatic fluid collection.   - Repeat CT chest/ abd/ pelvis was also reviewed and confirms these findings. Suggest following up with IR regarding repositioning drain vs insertion of a new pigtail catheter.     2. Serratia/ Pseudomonas infection:  - Cont Meropenem IV  - BCXs remain NGTD, follow temp curve and WBC    3. CHRISTINE:  - Requires outpt sleep study     4. Gen:  - DVT PX: Hep SQ
38 year old male with chronic cholecystitis/cholelithiasis s/p laparoscopic cholecystostomy tube placement in August 2018 and now s/p laparoscopic cholecystectomy 12/3/18 with post-operative bile leak. Pt is post op day 1 from US and fluoroscopy guided IR placement of right upper quadrant perihepatic drain. GI consulted.

## 2018-12-13 NOTE — PROGRESS NOTE ADULT - ASSESSMENT
38M PPD5 from US and fluoroscopy guided IR placement of right upper quadrant perihepatic drain, PPD5 from ERCP for bile leak with sphincterotomy and stent placement, now PPD3 s/p Abdominal tube check and exchange, upsizing and repositioning with IR.    - Regular diet   - Pain control prn  - Chest PT  - Appreciate ID recs- continue zosyn, CT if febrile   - Follow up blood cx - no growth to date  - monitor VS  - monitor drain op  - dvt ppx, oob/ambulate as tolerated. 38M PPD5 from US and fluoroscopy guided IR placement of right upper quadrant perihepatic drain, PPD5 from ERCP for bile leak with sphincterotomy and stent placement, now PPD3 s/p Abdominal tube check and exchange, upsizing and repositioning with IR.    - Regular diet   - Pain control prn  - Chest PT  - Appreciate ID recs- switched from cipro to meropenam, CT if febrile   - Follow up blood cx - no growth to date  - monitor VS  - monitor drain op  - dvt ppx, oob/ambulate as tolerated.

## 2018-12-13 NOTE — CONSULT NOTE ADULT - SUBJECTIVE AND OBJECTIVE BOX
ID CONSULTATION--Garth Johnson MD  Pager [phone]    Patient is a 38y old  Male who presents with a chief complaint of Transfer from OSH for abdominal pain (12 Dec 2018 08:29)    HPI:  38M w/ PMH of CHRISTINE, chronic cholecystitis/cholelithiasis s/p laparascopic cholecystostomy tube placement by Dr. Pritchett in August 2018 (Malencot drain placed in gallbladder and REMI drain left in liver/sidewall interface) and now s/p laparoscopic cholecystectomy 12/3/18 w/ Dr. Pritchett at [facility]. He tolerated procedure well and was sent home same day, however returned to [place] ED later that night with fevers, chills, and worsening abdominal pain. He was tachycardic, tachypneic, had a low grade temperature (~100F) with abdominal tenderness.     He was started on IVF and zosyn. CT A/P was suspicious for bile leak. Prior to CT scan, a rapid response was called for tachypnea and chest pain (troponin level <0.17). Vitals were stable during rapid response (SBP in 170s - elevated due to pain, saturating in high 90s on room air, tachycardic to 110s - due to pain, and afebrile). [facility].    On admission to [facility], patient is complaining of abdominal pain worst in RUQ and right shoulder pain with deep breathing, fevers and chills, and constipation. His last oral intake was contrast for the CT scan performed at 10 am this morning. He denies chest pain. He has had intermittent nausea, no vomiting. He has not passed a bowel movement in the last two days. (05 Dec 2018 14:26)    PAST MEDICAL & SURGICAL HISTORY:  Prediabetes  GERD (gastroesophageal reflux disease): no medications  Scoliosis  CHRISTINE (obstructive sleep apnea): no sleep studies done but had witnessed apnea by his brother  Calculus of gallbladder with cholecystitis without biliary obstruction, unspecified cholecystitis acuity  S/P laparoscopic cholecystectomy: 12/03/18  H/O abdominal surgery: insertion of biliary drain 2018    SOCIAL:no tobacco, from [place], lives w family  + working at [employer]    FAMILY HISTORY:  Family history of kidney stone (Sibling)  Family history of peripheral vascular disease  Family history of thyroid disease  Family history of cholelithiasis  Family history of Parkinson disease  Family history of diabetes mellitus    REVIEW OF SYSTEMS  General: malaise and low grade fevers  Skin:No rash	  Ophthalmologic:Denies any visual complaints,discharge redness or photophobia	  ENMT:No nasal discharge,headache,sinus congestion or throat pain.No dental complaints  Respiratory and Thorax: some sharp pain R chest w inspiration	  Cardiovascular:	No chest pain,palpitaions or dizziness  Gastrointestinal:	lessened pain in abdomen  Genitourinary:	No dysuria,frequency. No flank pain  Musculoskeletal:	No joint swelling or pain.No weakness  Neurological:No confusion,diziness.No extremity weakness.No bladder or bowel incontinence	    Allergies  adhesives (Rash)  cortisone (Rash)    ANTIMICROBIALS:  ciprofloxacin   IVPB 400 milliGRAM(s) IV Intermittent every 12 hours  ciprofloxacin   IVPB        Vital Signs Last 24 Hrs  T(C): 36.9 (12 Dec 2018 10:30), Max: 38 (11 Dec 2018 23:54)  T(F): 98.5 (12 Dec 2018 10:30), Max: 100.4 (11 Dec 2018 23:54)  HR: 98 (12 Dec 2018 10:30) (87 - 105)  BP: 130/80 (12 Dec 2018 10:30) (115/69 - 131/84)  BP(mean): --  RR: 16 (12 Dec 2018 10:30) (16 - 20)  SpO2: 95% (12 Dec 2018 10:30) (95% - 97%)    PHYSICAL EXAM:Pleasant patient in no acute distress.  Constitutional:Comfortable.Awake and alert  Eyes:PERRL EOMI.NO discharge or conjunctival injection  Neck:Supple,No LN,no JVD  Respiratory: Diminished BS R lower 1/2  Cardiovascular:S1 S2 wnl, No murmurs,rub or gallops  Gastrointestinal:Soft BS(+) R REMI  Extremities:No cyanosis,clubbing or edema.  Neurological:AAO X 3,No grossly focal deficits  Skin:No rash   Lymph Nodes:No palpable LNs  Musculoskeletal:No joint swelling or LOM  Psychiatric:Affect normal.                        12.7   17.9  )-----------( 413      ( 12 Dec 2018 06:42 )             39.6     12-12    133<L>  |  95<L>  |  12  ----------------------------<  86  4.2   |  23  |  0.44<L>    Ca    9.0      12 Dec 2018 06:42  Phos  2.9     12-12  Mg     2.2     12-12    TPro  8.1  /  Alb  3.3  /  TBili  0.5  /  DBili  0.2  /  AST  16  /  ALT  49<H>  /  AlkPhos  203<H>  12-11      RECENT CULTURES:  12-06 @ 03:40  .Body Fluid right upper quadrant perihepatic fluid  Serratia marcescens  Pseudomonas aeruginosa  Serratia marcescens  BAMBI    Rare Serratia marcescens  Rare Pseudomonas aeruginosa  --  12-06 @ 02:49  .Blood Blood-Peripheral  --No growth at 5 days.  --  12-05 @ 21:48  .Blood Blood  --  No growth at 5 days.  --  RADIOLOGY:  < from: Xray Chest 1 View- PORTABLE-Urgent (12.12.18 @ 03:15) >      The right hemidiaphragm remains elevated. Right basilar pleural effusion   and atelectasis/consolidation, unchanged. The left lung    No pneumothorax.    Right upper quadrant pigtail catheter.      < end of copied text >    < from: CT Abdomen and Pelvis No Cont (12.08.18 @ 14:02) >    IMPRESSION: Mild decrease in right subcapsular collection status post   placement of pigtail catheter.      < end of copied text >
CHIEF COMPLAINT: Abdominal pain     HPI: 38 M with a PMH of obesity, CHRISTINE (not on CPAP), GERD, cholelithiasis with chronic cholecystitis requiring a lap patrick tube placement in 8/18, s/p lap patrick on 12/3, who presented on 12/5 with acute severe abdominal pain along with right shoulder pain and was found to have a post op biliary leak which was confirmed on ERCP s/p metal stent placement in the bile duct and a pigtail placement by IR for drainage of the subdiaphragmatic collection. PT continues to have chills at night and is still complaining of intermittent sharp shooting pain in his right shoulder. He reports improvement in the abdominal pain. He reports subjective fever at night but denies any SOB, cough, CP, N/V/D, dysuria or hematuria. Fluid Cxs showed Serratia and pseudomonas and he has been on Meropenem IV.  He continues to have leukocytosis to 18 K despite antibiotics and drain placement. A CXR showed a small right pleural effusion and pulm consult was called to r/o an empyema.      PAST MEDICAL & SURGICAL HISTORY:  Prediabetes  GERD (gastroesophageal reflux disease): no medications  Scoliosis  CHRISTINE (obstructive sleep apnea): no sleep studies done but had witnessed apnea by his brother  Calculus of gallbladder with cholecystitis without biliary obstruction, unspecified cholecystitis acuity  S/P laparoscopic cholecystectomy: 12/03/18  H/O abdominal surgery: insertion of biliary drain 2018      FAMILY HISTORY:  Family history of kidney stone (Sibling)  Family history of peripheral vascular disease  Family history of thyroid disease  Family history of cholelithiasis  Family history of Parkinson disease  Family history of diabetes mellitus      SOCIAL HISTORY:  Smoking: [x ] Never Smoked [ ] Former Smoker (__ packs x ___ years) [ ] Current Smoker  (__ packs x ___ years)  Substance Use: [x ] Never Used [ ] Used ____  EtOH Use: Social  Marital Status: [x ] Single [ ]  [ ]  [ ]   Sexual History: NC  Occupation: Works in house keeping dept at [employer]   Recent Travel: None  Country of Birth: [place]  Advance Directives: Full code     Allergies    adhesives (Rash)  cortisone (Rash)    Intolerances        HOME MEDICATIONS:  Reviewed     REVIEW OF SYSTEMS:  Constitutional: [ ] negative [- ] fevers [+ ] chills [- ] weight loss [ ] weight gain  HEENT: [ ] negative [ ] dry eyes [- ] eye irritation [- ] postnasal drip [ ] nasal congestion  CV: [ ] negative  [- ] chest pain [- ] orthopnea [ -] palpitations [ ] murmur  Resp: [ ] negative [- ] cough [- ] shortness of breath [ ] dyspnea [ -] wheezing [- ] sputum [ ] hemoptysis  GI: [ ] negative [ -] nausea [ -] vomiting [- ] diarrhea [- ] constipation [+ ] abd pain [ -] dysphagia   : [ ] negative [- ] dysuria [ -] nocturia [- ] hematuria [- ] increased urinary frequency  Musculoskeletal: [ ] negative [- ] back pain [- ] myalgias [- ] arthralgias [ ] fracture  Skin: [ ] negative [ ] rash [ -] itch  Neurological: [ ] negative [ ] headache [ -] dizziness [- ] syncope [ -] weakness [ ] numbness  Psychiatric: [ ] negative [ -] anxiety [ ] depression  Endocrine: [ ] negative [ ] diabetes [ -] thyroid problem  Hematologic/Lymphatic: [ ] negative [ ] anemia [- ] bleeding problem  Allergic/Immunologic: [ ] negative [ ] itchy eyes [ -] nasal discharge [ ] hives [ ] angioedema  [x ] All other systems negative  [ ] Unable to assess ROS because ________    OBJECTIVE:  ICU Vital Signs Last 24 Hrs  T(C): 36.9 (13 Dec 2018 14:22), Max: 37.6 (12 Dec 2018 21:14)  T(F): 98.5 (13 Dec 2018 14:22), Max: 99.6 (12 Dec 2018 21:14)  HR: 79 (13 Dec 2018 14:22) (79 - 99)  BP: 114/71 (13 Dec 2018 14:22) (111/68 - 129/73)  BP(mean): --  ABP: --  ABP(mean): --  RR: 17 (13 Dec 2018 14:22) (17 - 18)  SpO2: 96% (13 Dec 2018 14:22) (95% - 97%)        12-12 @ 07:01  -  12-13 @ 07:00  --------------------------------------------------------  IN: 1665 mL / OUT: 1165 mL / NET: 500 mL    12-13 @ 07:01  -  12-13 @ 14:25  --------------------------------------------------------  IN: 520 mL / OUT: 710 mL / NET: -190 mL      CAPILLARY BLOOD GLUCOSE          PHYSICAL EXAM:  General: NAD  HEENT: PERRLA  Lymph Nodes: No palpable cervical LNs  Neck: Supple  Respiratory: Decreased BS b/l bases, R>L, no wheezing or crackles   Cardiovascular: RRR, S1+S2+0  Abdomen: Soft, NT, ND, BS+, biliary drain in place  Extremities: No edema, pulses + b/l LEs  Skin: No rash  Neurological: AAOx3, grossly non focal  Psychiatry: Normal mood     HOSPITAL MEDICATIONS:  heparin  Injectable 5000 Unit(s) SubCutaneous every 8 hours    meropenem  IVPB      meropenem  IVPB 1000 milliGRAM(s) IV Intermittent every 8 hours          acetaminophen   Tablet .. 975 milliGRAM(s) Oral every 6 hours  oxyCODONE    IR 10 milliGRAM(s) Oral every 6 hours PRN  oxyCODONE    IR 5 milliGRAM(s) Oral every 6 hours PRN    docusate sodium 100 milliGRAM(s) Oral daily  senna 1 Tablet(s) Oral daily                  LABS:                        12.0   18.3  )-----------( 570      ( 13 Dec 2018 07:16 )             40.6     Hgb Trend: 12.0<--, 12.7<--, 13.3<--, 13.2<--, 12.2<--  12-13    133<L>  |  96  |  14  ----------------------------<  106<H>  4.2   |  24  |  0.45<L>    Ca    8.8      13 Dec 2018 07:16  Phos  2.6     12-13  Mg     2.3     12-13    TPro  8.1  /  Alb  3.2<L>  /  TBili  0.4  /  DBili  0.1  /  AST  14  /  ALT  31  /  AlkPhos  185<H>  12-13    Creatinine Trend: 0.45<--, 0.44<--, 0.51<--, 0.56<--, 0.51<--, 0.48<--  PT/INR - ( 13 Dec 2018 12:18 )   PT: 14.7 sec;   INR: 1.28 ratio         PTT - ( 13 Dec 2018 12:18 )  PTT:33.2 sec          MICROBIOLOGY:   Culture - Blood (12.12.18 @ 09:44)    Specimen Source: .Blood Blood    Culture Results:   No growth to date.    Culture - Body Fluid with Gram Stain (12.06.18 @ 03:40)    -  Gentamicin: S 4    -  Gentamicin: S <=1    -  Imipenem: S 2    -  Imipenem: S <=1    -  Levofloxacin: S <=1    -  Levofloxacin: S <=1    -  Meropenem: S <=1    -  Meropenem: S <=1    -  Piperacillin/Tazobactam: S <=8    -  Piperacillin/Tazobactam: I 32    -  Tobramycin: S <=2    -  Tobramycin: I 8    -  Trimethoprim/Sulfamethoxazole: S <=0.5/9.5    Gram Stain:   polymorphonuclear leukocytes seen  No organisms seen  by cytocentrifuge    -  Amikacin: S <=8    -  Amikacin: S <=8    -  Amoxicillin/Clavulanic Acid: R >16/8    -  Ampicillin: R >16 These ampicillin results predict results for amoxicillin    -  Ampicillin/Sulbactam: R >16/8    -  Aztreonam: S <=4    -  Aztreonam: R >16    -  Cefazolin: R >16    -  Cefepime: S <=2    -  Cefepime: S <=2    -  Cefoxitin: R >16    -  Ceftazidime: S 4    -  Ceftriaxone: R 32 Enterobacter, Citrobacter, and Serratia may develop resistance during prolonged therapy    -  Ciprofloxacin: S <=0.5    -  Ciprofloxacin: S <=0.5    -  Ertapenem: S <=0.5    Specimen Source: .Body Fluid right upper quadrant perihepatic fluid    Culture Results:   Rare Serratia marcescens  Rare Pseudomonas aeruginosa    Organism Identification: Serratia marcescens  Pseudomonas aeruginosa    Organism: Serratia marcescens    Organism: Pseudomonas aeruginosa    Method Type: BAMBI    Method Type: BAMBI        RADIOLOGY:  < from: Xray Chest 1 View- PORTABLE-Urgent (12.12.18 @ 03:15) >  The right hemidiaphragm remains elevated. Right basilar pleural effusion   and atelectasis/consolidation, unchanged. The left lung    No pneumothorax.    Right upper quadrant pigtail catheter.    < from: CT Abdomen and Pelvis No Cont (12.08.18 @ 14:02) >  LOWER CHEST: Small right pleural effusion with adjacent atelectasis.    LIVER: Subcapsular homogeneous fluid collection is decreased in size from   prior study. Perihepatic drain is visualized. Hepatic steatosis. A 2.8 cm   elliptical shaped high attenuation focus is present either within the   liver and or the collection. The etiology is unclear.  BILE DUCTS: CBD stent is noted in place. Pneumobilia is noted.  GALLBLADDER: Status post cholecystectomy.  SPLEEN: Within normal limits.  PANCREAS: Within normal limits.  ADRENALS: Within normal limits.  KIDNEYS/URETERS: Within normal limits.    BLADDER: Within normal limits.  REPRODUCTIVE ORGANS: Prostate within normal limits.    BOWEL: No bowel obstruction. Appendix is normal.  PERITONEUM: Free fluid isnoted in the abdomen.  VESSELS:  Within normal limits.  RETROPERITONEUM: No lymphadenopathy.    ABDOMINAL WALL: Thick linear isodense focus within the subcutaneous   tissues of the right lower quadrant of the abdomen likely represents   track of prior catheter in this location.  BONES: Mild multilevel degenerative disease.      [ ] Reviewed and interpreted by me    PULMONARY FUNCTION TESTS:    EKG:
Chief Complaint:  Patient is a 38y old  Male who presents with a chief complaint of Transfer from OSH for abdominal pain (06 Dec 2018 05:21)      HPI:38M w/ PMH of CHRISTINE, chronic cholecystitis/cholelithiasis s/p laparoscopic cholecystostomy tube placement by Dr. Pritchett in August 2018 (Malencot drain placed in gallbladder and REMI drain left in liver/sidewall interface) and now s/p laparoscopic cholecystectomy 12/3/18 w/ Dr. Pritchett at [facility]. He tolerated procedure well and was sent home same day, however returned to [place] ED later that night with fevers, chills, and worsening abdominal pain. He was tachycardic, tachypneic, had a low grade temperature (~100F) with abdominal tenderness. He was started on IVF and zosyn. CT A/P was suspicious for bile leak. Prior to CT scan, a rapid response was called for tachypnea and chest pain (troponin level <0.17). Vitals were stable during rapid response (SBP in 170s - elevated due to pain, saturating in high 90s on room air, tachycardic to 110s - due to pain, and afebrile). Patient was then transfered to [facility] IR drainage of bile.    On admission to [facility], patient is complaining of abdominal pain worst in RUQ and right shoulder pain with deep breathing, fevers and chills, and constipation. CT scan on admission with suspicion for bile leak. Pt is now POD#1 from US and fluoroscopy guided IR placement of right upper quadrant perihepatic drain. Pt admits to moderate RUQ pain. He denies n/v/d. Admits to constipation.      Allergies:  adhesives (Rash)  cortisone (Rash)      Medications:  lactated ringers. 1000 milliLiter(s) IV Continuous <Continuous>  piperacillin/tazobactam IVPB. 3.375 Gram(s) IV Intermittent every 8 hours  sodium phosphate IVPB 30 milliMole(s) IV Intermittent once      PMHX/PSHX:  Prediabetes  GERD (gastroesophageal reflux disease)  Scoliosis  CHRISTINE (obstructive sleep apnea)  Calculus of gallbladder with cholecystitis without biliary obstruction, unspecified cholecystitis acuity  Gall bladder pain  S/P laparoscopic cholecystectomy  H/O abdominal surgery  No significant past surgical history      Family history:  Family history of kidney stone (Sibling)  Family history of peripheral vascular disease  Family history of thyroid disease  Family history of cholelithiasis  Family history of Parkinson disease  Family history of diabetes mellitus      Social History:     ROS:     General:  No wt loss, fevers, chills, night sweats, fatigue,   Eyes:  Good vision, no reported pain  ENT:  No sore throat, pain, runny nose, dysphagia  CV:  No pain, palpitations, hypo/hypertension  Resp:  No dyspnea, cough, tachypnea, wheezing  GI:  + RUQ pain, No nausea, No vomiting, No diarrhea, No constipation, No weight loss, No fever, No pruritis, No rectal bleeding, No tarry stools, No dysphagia,  :  No pain, bleeding, incontinence, nocturia  Muscle:  No pain, weakness  Neuro:  No weakness, tingling, memory problems  Psych:  No fatigue, insomnia, mood problems, depression  Endocrine:  No polyuria, polydipsia, cold/heat intolerance  Heme:  No petechiae, ecchymosis, easy bruisability  Skin:  No rash, tattoos, scars, edema      PHYSICAL EXAM:   Vital Signs:  Vital Signs Last 24 Hrs  T(C): 37.6 (06 Dec 2018 07:05), Max: 39.9 (06 Dec 2018 00:03)  T(F): 99.7 (06 Dec 2018 07:05), Max: 103.9 (06 Dec 2018 00:03)  HR: 125 (06 Dec 2018 05:05) (72 - 125)  BP: 126/80 (06 Dec 2018 05:05) (108/69 - 157/85)  BP(mean): --  RR: 16 (06 Dec 2018 05:05) (16 - 18)  SpO2: 93% (06 Dec 2018 05:05) (92% - 97%)  Daily Height in cm: 165.1 (05 Dec 2018 13:50)    Daily     GENERAL:  Appears stated age, well-groomed, well-nourished, no distress  HEENT:  NC/AT,  conjunctivae clear and pink, no thyromegaly, nodules, adenopathy, no JVD, sclera -anicteric  CHEST:  Full & symmetric excursion, no increased effort, breath sounds clear  HEART:  Regular rhythm, S1, S2, no murmur/rub/S3/S4, no abdominal bruit, no edema  ABDOMEN:  Soft, tender in RUQ, mildly distended, no rebound/guarding, REMI drain in place and collecting serosang fluid   EXTREMITIES  no cyanosis, clubbing or edema  SKIN:  No rash/erythema/ecchymoses/petechiae/wounds/abscess/warm/dry  NEURO:  Alert, oriented, no asterixis, no tremor, no encephalopathy    LABS:                        15.0   22.1  )-----------( 310      ( 06 Dec 2018 04:47 )             45.1     12-06    132<L>  |  96  |  13  ----------------------------<  120<H>  4.7   |  24  |  0.68    Ca    9.2      06 Dec 2018 04:47  Phos  2.0     12-06  Mg     2.4     12-06    TPro  7.7  /  Alb  3.2<L>  /  TBili  2.3<H>  /  DBili  1.4<H>  /  AST  37  /  ALT  60<H>  /  AlkPhos  107  12-06    LIVER FUNCTIONS - ( 06 Dec 2018 04:47 )  Alb: 3.2 g/dL / Pro: 7.7 g/dL / ALK PHOS: 107 U/L / ALT: 60 U/L / AST: 37 U/L / GGT: x           PT/INR - ( 05 Dec 2018 16:11 )   PT: 16.9 sec;   INR: 1.47 ratio         PTT - ( 05 Dec 2018 16:11 )  PTT:29.4 sec  Urinalysis Basic - ( 06 Dec 2018 00:53 )    Color: Dark Yellow / Appearance: Clear / SG: >1.050 / pH: x  Gluc: x / Ketone: Negative  / Bili: Small / Urobili: Negative   Blood: x / Protein: 100 mg/dL / Nitrite: Negative   Leuk Esterase: Negative / RBC: 7 /hpf / WBC 6 /hpf   Sq Epi: x / Non Sq Epi: 3 /hpf / Bacteria: Negative          Imaging: < from: CT Abdomen and Pelvis w/ Oral Cont and w/ IV Cont (12.05.18 @ 10:15) >    EXAM:  CT ABDOMEN AND PELVIS OC IC      PROCEDURE DATE:  12/05/2018        INTERPRETATION:  CLINICAL HISTORY:  Fever and pain following laparoscopic   cholecystectomy 2 days ago.    Multiple axial images of the abdomen and pelvis were obtained fromthe   lung bases through pubic symphysis with the administration of oral   contrast. 95 cc of Omnipaque 350 was administered intravenously without   complication. Reformatted coronal and sagittal images are submitted.    COMPARISON: August 12, 2018    FINDINGS:    There is a large subcapsular homogeneous in attenuation perihepatic fluid   collection identified. Fluid and air is identified within the gallbladder   fossa adjacent to surgical clips. Fluid is also identified tracking along   the right paracolic gutter with free fluid identified within the deep   pelvis. Segmental wall thickening of the colon is identified adjacent to   the gallbladder fossa. Stranding of the fat within the gallbladder fossa   region noted. Foci of air attenuationidentified inferior to the right   hemidiaphragm and adjacent to the cecal region likely related to recent   laparoscopic surgical intervention.    Scalloping of the hepatic parenchymal margins identified likely related   to the subcapsular fluid collection. Decreased attenuation of the hepatic   parenchyma suggests advanced hepatic parenchymal fatty infiltration. No   focal hepatic masses are identified. There is no evidence for   intrahepatic or extrahepatic biliary dilatation.  The spleen, pancreas and adrenal glands are unremarkable.    There is no evidence for bilateral hydronephrosis, renal calculi or   space-occupying lesions of the renal parenchyma. The abdominal aorta is   normal in course and caliber. Subcentimeter retroperitoneal para-aortic   lymph nodes noted, stable.    Fecal material is scattered throughout the colon. There is no evidence   for mechanical bowel obstruction. The appendix is not visualized;   correlate with surgical history. The bladder appears unremarkable.     Imaging of the lung bases demonstrates opacity within the right lower   lobe, likely secondary to atelectatic change; underlying parenchymal   consolidation cannot be excluded. Band type opacities within the left   lower lobe likely atelectatic innature. Small right pleural effusion.    No acute osseous fractures evident.    IMPRESSION:  There is a large subcapsular homogeneous in attenuation   perihepatic fluid collection identified. Fluid and air is identified   within the gallbladder fossaadjacent to surgical clips. Fluid is also   identified tracking along the right paracolic gutter with free fluid   identified within the deep pelvis. Findings suspicious for bile leak in   this patient status post laparoscopic cholecystectomy. Segmental wall   thickening of the colon is identified adjacent to the gallbladder fossa.   Stranding of the fat within the gallbladder fossa region noted.     Results discussed with Dr. Pritchett.                  KELSEY MAHAN M.D., ATTENDING RADIOLOGIST  This document has been electronically signed. Dec  5 2018 10:56AM

## 2018-12-14 ENCOUNTER — APPOINTMENT (OUTPATIENT)
Dept: FAMILY MEDICINE | Facility: HOSPITAL | Age: 38
End: 2018-12-14

## 2018-12-14 LAB
ALBUMIN SERPL ELPH-MCNC: 3.3 G/DL — SIGNIFICANT CHANGE UP (ref 3.3–5)
ALP SERPL-CCNC: 169 U/L — HIGH (ref 40–120)
ALT FLD-CCNC: 32 U/L — SIGNIFICANT CHANGE UP (ref 10–45)
ANION GAP SERPL CALC-SCNC: 11 MMOL/L — SIGNIFICANT CHANGE UP (ref 5–17)
APTT BLD: 31 SEC — SIGNIFICANT CHANGE UP (ref 27.5–36.3)
AST SERPL-CCNC: 16 U/L — SIGNIFICANT CHANGE UP (ref 10–40)
BILIRUB DIRECT SERPL-MCNC: 0.1 MG/DL — SIGNIFICANT CHANGE UP (ref 0–0.2)
BILIRUB INDIRECT FLD-MCNC: 0.3 MG/DL — SIGNIFICANT CHANGE UP (ref 0.2–1)
BILIRUB SERPL-MCNC: 0.4 MG/DL — SIGNIFICANT CHANGE UP (ref 0.2–1.2)
BUN SERPL-MCNC: 14 MG/DL — SIGNIFICANT CHANGE UP (ref 7–23)
CALCIUM SERPL-MCNC: 9.3 MG/DL — SIGNIFICANT CHANGE UP (ref 8.4–10.5)
CHLORIDE SERPL-SCNC: 98 MMOL/L — SIGNIFICANT CHANGE UP (ref 96–108)
CO2 SERPL-SCNC: 27 MMOL/L — SIGNIFICANT CHANGE UP (ref 22–31)
CREAT SERPL-MCNC: 0.5 MG/DL — SIGNIFICANT CHANGE UP (ref 0.5–1.3)
GLUCOSE SERPL-MCNC: 91 MG/DL — SIGNIFICANT CHANGE UP (ref 70–99)
HCT VFR BLD CALC: 38.8 % — LOW (ref 39–50)
HGB BLD-MCNC: 12.2 G/DL — LOW (ref 13–17)
INR BLD: 1.21 RATIO — HIGH (ref 0.88–1.16)
MAGNESIUM SERPL-MCNC: 2.4 MG/DL — SIGNIFICANT CHANGE UP (ref 1.6–2.6)
MCHC RBC-ENTMCNC: 26.1 PG — LOW (ref 27–34)
MCHC RBC-ENTMCNC: 31.3 GM/DL — LOW (ref 32–36)
MCV RBC AUTO: 83.3 FL — SIGNIFICANT CHANGE UP (ref 80–100)
PHOSPHATE SERPL-MCNC: 3 MG/DL — SIGNIFICANT CHANGE UP (ref 2.5–4.5)
PLATELET # BLD AUTO: 589 K/UL — HIGH (ref 150–400)
POTASSIUM SERPL-MCNC: 4.4 MMOL/L — SIGNIFICANT CHANGE UP (ref 3.5–5.3)
POTASSIUM SERPL-SCNC: 4.4 MMOL/L — SIGNIFICANT CHANGE UP (ref 3.5–5.3)
PROT SERPL-MCNC: 8.6 G/DL — HIGH (ref 6–8.3)
PROTHROM AB SERPL-ACNC: 13.9 SEC — HIGH (ref 10–12.9)
RBC # BLD: 4.65 M/UL — SIGNIFICANT CHANGE UP (ref 4.2–5.8)
RBC # FLD: 13.9 % — SIGNIFICANT CHANGE UP (ref 10.3–14.5)
SODIUM SERPL-SCNC: 136 MMOL/L — SIGNIFICANT CHANGE UP (ref 135–145)
WBC # BLD: 13.5 K/UL — HIGH (ref 3.8–10.5)
WBC # FLD AUTO: 13.5 K/UL — HIGH (ref 3.8–10.5)

## 2018-12-14 PROCEDURE — 49406 IMAGE CATH FLUID PERI/RETRO: CPT

## 2018-12-14 PROCEDURE — 99232 SBSQ HOSP IP/OBS MODERATE 35: CPT

## 2018-12-14 RX ADMIN — Medication 975 MILLIGRAM(S): at 21:30

## 2018-12-14 RX ADMIN — HEPARIN SODIUM 5000 UNIT(S): 5000 INJECTION INTRAVENOUS; SUBCUTANEOUS at 06:03

## 2018-12-14 RX ADMIN — Medication 975 MILLIGRAM(S): at 20:33

## 2018-12-14 RX ADMIN — MEROPENEM 100 MILLIGRAM(S): 1 INJECTION INTRAVENOUS at 06:03

## 2018-12-14 RX ADMIN — HEPARIN SODIUM 5000 UNIT(S): 5000 INJECTION INTRAVENOUS; SUBCUTANEOUS at 13:49

## 2018-12-14 RX ADMIN — Medication 975 MILLIGRAM(S): at 01:27

## 2018-12-14 RX ADMIN — Medication 975 MILLIGRAM(S): at 00:57

## 2018-12-14 RX ADMIN — Medication 100 MILLIGRAM(S): at 22:27

## 2018-12-14 RX ADMIN — HEPARIN SODIUM 5000 UNIT(S): 5000 INJECTION INTRAVENOUS; SUBCUTANEOUS at 22:26

## 2018-12-14 RX ADMIN — SENNA PLUS 1 TABLET(S): 8.6 TABLET ORAL at 22:26

## 2018-12-14 RX ADMIN — MEROPENEM 100 MILLIGRAM(S): 1 INJECTION INTRAVENOUS at 22:27

## 2018-12-14 RX ADMIN — Medication 975 MILLIGRAM(S): at 06:33

## 2018-12-14 RX ADMIN — Medication 975 MILLIGRAM(S): at 06:03

## 2018-12-14 RX ADMIN — MEROPENEM 100 MILLIGRAM(S): 1 INJECTION INTRAVENOUS at 13:49

## 2018-12-14 RX ADMIN — Medication 975 MILLIGRAM(S): at 11:04

## 2018-12-14 NOTE — PROGRESS NOTE ADULT - SUBJECTIVE AND OBJECTIVE BOX
INFECTIOUS DISEASES FOLLOW UP--Garth Johnson MD  Pager [phone]    This is a follow up note for this  38y Male with  fever, bile leak and elevated WBC count.  Improving clinically.  Less pain.    Pulm input appreciated.    Further ROS:  CONSTITUTIONAL:  No fever, good appetite  CARDIOVASCULAR:  No chest pain or palpitations  RESPIRATORY:  No dyspnea  GASTROINTESTINAL:  No nausea, vomiting, diarrhea, or abdominal pain  GENITOURINARY:  No dysuria  NEUROLOGIC:  No headache,     Allergies  adhesives (Rash)  cortisone (Rash)    ANTIBIOTICS/RELEVANT:  antimicrobials  meropenem  IVPB      meropenem  IVPB 1000 milliGRAM(s) IV Intermittent every 8 hours    OTHER:  acetaminophen   Tablet .. 975 milliGRAM(s) Oral every 6 hours  docusate sodium 100 milliGRAM(s) Oral daily  heparin  Injectable 5000 Unit(s) SubCutaneous every 8 hours  oxyCODONE    IR 10 milliGRAM(s) Oral every 6 hours PRN  oxyCODONE    IR 5 milliGRAM(s) Oral every 6 hours PRN  senna 1 Tablet(s) Oral daily    Objective:  Vital Signs Last 24 Hrs  T(C): 36.8 (14 Dec 2018 10:14), Max: 37.3 (13 Dec 2018 22:00)  T(F): 98.3 (14 Dec 2018 10:14), Max: 99.1 (13 Dec 2018 22:00)  HR: 95 (14 Dec 2018 10:14) (79 - 95)  BP: 120/71 (14 Dec 2018 10:14) (111/72 - 123/80)  BP(mean): --  RR: 18 (14 Dec 2018 10:14) (17 - 19)  SpO2: 95% (14 Dec 2018 10:14) (95% - 98%)    PHYSICAL EXAM:  Constitutional:no acute distress  Eyes:ROSA, EOMI  Ear/Nose/Throat: no oral lesions, 	  Respiratory: clear BL  Cardiovascular: S1S2  Gastrointestinal:soft, (+) BS, no tenderness  Extremities:no e/e/c  No Lymphadenopathy  IV sites not inflammed.    LABS:                        12.2   13.5  )-----------( 589      ( 14 Dec 2018 07:08 )             38.8     12-14    136  |  98  |  14  ----------------------------<  91  4.4   |  27  |  0.50    Ca    9.3      14 Dec 2018 07:08  Phos  3.0     12-14  Mg     2.4     12-14    TPro  8.6<H>  /  Alb  3.3  /  TBili  0.4  /  DBili  0.1  /  AST  16  /  ALT  32  /  AlkPhos  169<H>  12-14  PT/INR - ( 14 Dec 2018 07:08 )   PT: 13.9 sec;   INR: 1.21 ratio    PTT - ( 14 Dec 2018 07:08 )  PTT:31.0 sec    MICROBIOLOGY: no new data    RADIOLOGY & ADDITIONAL STUDIES:    < from: CT Abdomen and Pelvis w/ Oral Cont and w/ IV Cont (12.13.18 @ 11:12) >    Subcapsular fluid collection, not significantly changed in size dating   back to 12/5/2018. Air and fluid collection in the gallbladder fossa   which has increased in size since 12/5/2018.    < end of copied text >

## 2018-12-14 NOTE — PROGRESS NOTE ADULT - SUBJECTIVE AND OBJECTIVE BOX
38y Male with PSH of cholecystectomy 12/3 with postoperative bile leak s/p drainage of biloma on 12/5  in Interventional Radiology with Dr. Crews. Pt underwent endoscopy on 12/6 with GI, bile leak found s/p sphincterotomy and covered metal stent placement in CBD s/p exchange and upsizing of the drain ( 12french) on 12/10. Pt found to have     NPO status:   Anticoagulation: heparin 500units SQ at 1300.  Antibiotics: Meropenem IV at 1300.    Allergies: adhesives (Rash)  cortisone (Rash)      PAST MEDICAL & SURGICAL HISTORY:  Prediabetes  GERD (gastroesophageal reflux disease): no medications  Scoliosis  CHRISTINE (obstructive sleep apnea): no sleep studies done but had witnessed apnea by his brother  Calculus of gallbladder with cholecystitis without biliary obstruction, unspecified cholecystitis acuity  S/P laparoscopic cholecystectomy: 12/03/18  H/O abdominal surgery: insertion of biliary drain 2018        Pertinent labs:                      12.2   13.5  )-----------( 589      ( 14 Dec 2018 07:08 )             38.8   12-14    136  |  98  |  14  ----------------------------<  91  4.4   |  27  |  0.50    Ca    9.3      14 Dec 2018 07:08  Phos  3.0     12-14  Mg     2.4     12-14    TPro  8.6<H>  /  Alb  3.3  /  TBili  0.4  /  DBili  0.1  /  AST  16  /  ALT  32  /  AlkPhos  169<H>  12-14  PT/INR - ( 14 Dec 2018 07:08 )   PT: 13.9 sec;   INR: 1.21 ratio         PTT - ( 14 Dec 2018 07:08 )  PTT:31.0 sec    Consent: Procedure/risks/ Benefits explained. Informed consent obtained. Pt verbalizes understanding. 38y Male with PSH of cholecystectomy 12/3 with postoperative bile leak s/p drainage of biloma on 12/5  in Interventional Radiology with Dr. Crews. Pt underwent endoscopy on 12/6 with GI, bile leak found s/p sphincterotomy and covered metal stent placement in CBD s/p exchange and upsizing of the drain ( 12french) on 12/10 with Dr. Hernandez. Pt found to have Gallbladder fossa collection presented to IR for drainage of collection.     NPO status: since midnight   Anticoagulation: heparin 500units SQ at 1300.  Antibiotics: Meropenem IV at 1300.    Allergies: adhesives (Rash)  cortisone (Rash)      PAST MEDICAL & SURGICAL HISTORY:  Prediabetes  GERD (gastroesophageal reflux disease): no medications  Scoliosis  CHRISTINE (obstructive sleep apnea): no sleep studies done but had witnessed apnea by his brother  Calculus of gallbladder with cholecystitis without biliary obstruction, unspecified cholecystitis acuity  S/P laparoscopic cholecystectomy: 12/03/18  H/O abdominal surgery: insertion of biliary drain 2018        Pertinent labs:                      12.2   13.5  )-----------( 589      ( 14 Dec 2018 07:08 )             38.8   12-14    136  |  98  |  14  ----------------------------<  91  4.4   |  27  |  0.50    Ca    9.3      14 Dec 2018 07:08  Phos  3.0     12-14  Mg     2.4     12-14    TPro  8.6<H>  /  Alb  3.3  /  TBili  0.4  /  DBili  0.1  /  AST  16  /  ALT  32  /  AlkPhos  169<H>  12-14  PT/INR - ( 14 Dec 2018 07:08 )   PT: 13.9 sec;   INR: 1.21 ratio         PTT - ( 14 Dec 2018 07:08 )  PTT:31.0 sec    The RUQ 12.0 [place] drain was flushed with saline and yellow fluid return was noted. Plan is for drainage of gallbladder fossa collection drainage with IV sedation and CT scan guidance.  Consent: Procedure/risks/ Benefits explained. Informed consent obtained. Pt verbalizes understanding.

## 2018-12-14 NOTE — PROGRESS NOTE ADULT - SUBJECTIVE AND OBJECTIVE BOX
Interval Events: Pt seen and examined. No acute overnight events. Pt states he is feeling better today. Abdominal pain is improving, no n/v.  Tolerating PO.    NPO for IR procedure today - repositioning of current drain and possible GB fossa drain    MEDICATIONS  (STANDING):  acetaminophen   Tablet .. 975 milliGRAM(s) Oral every 6 hours  docusate sodium 100 milliGRAM(s) Oral daily  heparin  Injectable 5000 Unit(s) SubCutaneous every 8 hours  meropenem  IVPB      meropenem  IVPB 1000 milliGRAM(s) IV Intermittent every 8 hours  senna 1 Tablet(s) Oral daily    MEDICATIONS  (PRN):  oxyCODONE    IR 10 milliGRAM(s) Oral every 6 hours PRN Severe Pain (7 - 10)  oxyCODONE    IR 5 milliGRAM(s) Oral every 6 hours PRN Moderate Pain (4 - 6)      Allergies    adhesives (Rash)  cortisone (Rash)    Intolerances        Review of Systems:    General:  No wt loss, fevers, chills, night sweats,fatigue,   Eyes:  Good vision, no reported pain  ENT:  No sore throat, pain, runny nose, dysphagia  CV:  No pain, palpitations, hypo/hypertension  Resp:  No dyspnea, cough, tachypnea, wheezing  GI:  No pain, No nausea, No vomiting, No diarrhea, No constipation, No weight loss, No fever, No pruritis, No rectal bleeding, No melena, No dysphagia  :  No pain, bleeding, incontinence, nocturia  Muscle:  No pain, weakness  Neuro:  No weakness, tingling, memory problems  Psych:  No fatigue, insomnia, mood problems, depression  Endocrine:  No polyuria, polydypsia, cold/heat intolerance  Heme:  No petechiae, ecchymosis, easy bruisability  Skin:  No rash, tattoos, scars, edema      Vital Signs Last 24 Hrs  T(C): 37.2 (14 Dec 2018 06:00), Max: 37.3 (13 Dec 2018 22:00)  T(F): 98.9 (14 Dec 2018 06:00), Max: 99.1 (13 Dec 2018 22:00)  HR: 92 (14 Dec 2018 06:00) (79 - 95)  BP: 123/74 (14 Dec 2018 06:00) (111/68 - 123/80)  BP(mean): --  RR: 18 (14 Dec 2018 06:00) (17 - 19)  SpO2: 98% (14 Dec 2018 06:00) (95% - 98%)    PHYSICAL EXAM:    Constitutional: NAD, well-developed  HEENT: EOMI, throat clear  Neck: No LAD, supple  Respiratory: CTA and P  Cardiovascular: S1 and S2, RRR, no M  Gastrointestinal: soft, NT, mildly distended, IR drain in place in RLQ and collecting bilious fluid, old lap sites covered with steri strips, old drain site in RLQ healing well  Extremities: No peripheral edema, neg clubing, cyanosis  Vascular: 2+ peripheral pulses  Neurological: A/O x 3, no focal deficits  Psychiatric: Normal mood, normal affect  Skin: No rashes      LABS:                        12.2   13.5  )-----------( 589      ( 14 Dec 2018 07:08 )             38.8     12-14    136  |  98  |  14  ----------------------------<  91  4.4   |  27  |  0.50    Ca    9.3      14 Dec 2018 07:08  Phos  3.0     12-14  Mg     2.4     12-14    TPro  8.6<H>  /  Alb  3.3  /  TBili  0.4  /  DBili  0.1  /  AST  16  /  ALT  32  /  AlkPhos  169<H>  12-14    PT/INR - ( 14 Dec 2018 07:08 )   PT: 13.9 sec;   INR: 1.21 ratio         PTT - ( 14 Dec 2018 07:08 )  PTT:31.0 sec      RADIOLOGY & ADDITIONAL TESTS:

## 2018-12-14 NOTE — PROGRESS NOTE ADULT - ASSESSMENT
Imp/Rx:  Clinically a bit better.  Normalizing WBC count.  No fevers and less pain.  Plans for optimizing drainage noted.  Would continue the meropenem for now.    ID covers this weekend--- [id number]

## 2018-12-14 NOTE — PROGRESS NOTE ADULT - SUBJECTIVE AND OBJECTIVE BOX
Red Team Surgery Progress Note    SUBJECTIVE: Patient seen and examined at the bedside. No acute events overnight. Feeling better this morning. Has not had difficulty breathing. Has been NPO for procedure this morning without nausea or vomiting. Pain is well controlled. Has passed flatus and a bowel movement. Ambulating well.     VITALS  T(C): 37.2 (12-14-18 @ 06:00), Max: 37.3 (12-13-18 @ 22:00)  HR: 92 (12-14-18 @ 06:00) (79 - 95)  BP: 123/74 (12-14-18 @ 06:00) (111/68 - 123/80)  RR: 18 (12-14-18 @ 06:00) (17 - 19)  SpO2: 98% (12-14-18 @ 06:00) (95% - 98%)    Is/Os    12-13 @ 07:01  -  12-14 @ 07:00  --------------------------------------------------------  IN:    Oral Fluid: 1360 mL  Total IN: 1360 mL    OUT:    Drain: 30 mL    Voided: 1850 mL  Total OUT: 1880 mL    Total NET: -520 mL    PHYSICAL EXAM:   General: NAD, Lying in bed comfortably, alert, oriented x3  Pulm: Non-labored breathing on RA  GI/Abd: Soft, NT, distended, no rebound/guarding, REMI drain in place and collecting bile-tinged fluid, steris from old lap sites in place and old drain site healing well    MEDICATIONS (STANDING): acetaminophen   Tablet .. 975 milliGRAM(s) Oral every 6 hours  docusate sodium 100 milliGRAM(s) Oral daily  heparin  Injectable 5000 Unit(s) SubCutaneous every 8 hours  meropenem  IVPB      meropenem  IVPB 1000 milliGRAM(s) IV Intermittent every 8 hours  senna 1 Tablet(s) Oral daily    MEDICATIONS (PRN):oxyCODONE    IR 10 milliGRAM(s) Oral every 6 hours PRN Severe Pain (7 - 10)  oxyCODONE    IR 5 milliGRAM(s) Oral every 6 hours PRN Moderate Pain (4 - 6)    LABS  CBC (12-14 @ 07:08)                              12.2<L>                         13.5<H>  )----------------(  589<H>     --    % Neutrophils, --    % Lymphocytes, ANC: --                                  38.8<L>  CBC (12-13 @ 07:16)                              12.0<L>                         18.3<H>  )----------------(  570<H>     --    % Neutrophils, --    % Lymphocytes, ANC: --                                  40.6      BMP (12-13 @ 07:16)             133<L>  |  96      |  14    		Ca++ --      Ca 8.8                ---------------------------------( 106<H>		Mg 2.3                4.2     |  24      |  0.45<L>			Ph 2.6       LFTs (12-13 @ 07:16)      TPro 8.1 / Alb 3.2<L> / TBili 0.4 / DBili 0.1 / AST 14 / ALT 31 / AlkPhos 185<H>    Coags (12-13 @ 12:18)  aPTT 33.2 / INR 1.28<H> / PT 14.7<H>  < from: CT Abdomen and Pelvis w/ Oral Cont and w/ IV Cont (12.13.18 @ 11:12) >  Subcapsular fluid collection, not significantly changed in size dating   back to 12/5/2018. Air and fluid collection in the gallbladder fossa   which has increased in size since 12/5/2018.    These findings were discussed with NIKKY Dockery on 12/13/2018 11:39   AM by Dr. Dimas and on 12/13/2018 at 12:43 AM by Dr. Yoder with   read back confirmation.    < end of copied text >

## 2018-12-14 NOTE — PROGRESS NOTE ADULT - ASSESSMENT
38M PPD6 from US and fluoroscopy guided IR placement of right upper quadrant perihepatic drain, PPD6 from ERCP for bile leak with sphincterotomy and stent placement, now PPD4 s/p Abdominal tube check and exchange, upsizing and repositioning with IR, CT from yesterday did not show any significant change in fluid collection and increasing fluid collection in GB fossa.    - NPO for IR procedure today - repositioning of current drain and possible GB fossa drain placement   - Pain control prn  - Chest PT  - Cont meropenem per ID recs, f/u any new recs  - Follow up blood cx - no growth to date  - Pulmonology attributing fever from 2 days ago likely from collection and not from a pulmonary source  - dvt ppx, oob/ambulate as tolerated.

## 2018-12-14 NOTE — PROCEDURE NOTE - GENERAL PROCEDURE DETAILS
10 Fr drain placed in gallbladder fossa fluid collection. ~50cc of bloody purulent fluid aspirated. specimen sent for cx. drain connected to bulb.

## 2018-12-15 LAB
ANION GAP SERPL CALC-SCNC: 11 MMOL/L — SIGNIFICANT CHANGE UP (ref 5–17)
BUN SERPL-MCNC: 15 MG/DL — SIGNIFICANT CHANGE UP (ref 7–23)
CALCIUM SERPL-MCNC: 9.2 MG/DL — SIGNIFICANT CHANGE UP (ref 8.4–10.5)
CHLORIDE SERPL-SCNC: 98 MMOL/L — SIGNIFICANT CHANGE UP (ref 96–108)
CO2 SERPL-SCNC: 26 MMOL/L — SIGNIFICANT CHANGE UP (ref 22–31)
CREAT SERPL-MCNC: 0.51 MG/DL — SIGNIFICANT CHANGE UP (ref 0.5–1.3)
GLUCOSE SERPL-MCNC: 93 MG/DL — SIGNIFICANT CHANGE UP (ref 70–99)
HCT VFR BLD CALC: 35.9 % — LOW (ref 39–50)
HGB BLD-MCNC: 11.6 G/DL — LOW (ref 13–17)
MAGNESIUM SERPL-MCNC: 2.5 MG/DL — SIGNIFICANT CHANGE UP (ref 1.6–2.6)
MCHC RBC-ENTMCNC: 26.7 PG — LOW (ref 27–34)
MCHC RBC-ENTMCNC: 32.3 GM/DL — SIGNIFICANT CHANGE UP (ref 32–36)
MCV RBC AUTO: 82.8 FL — SIGNIFICANT CHANGE UP (ref 80–100)
PHOSPHATE SERPL-MCNC: 3.4 MG/DL — SIGNIFICANT CHANGE UP (ref 2.5–4.5)
PLATELET # BLD AUTO: 579 K/UL — HIGH (ref 150–400)
POTASSIUM SERPL-MCNC: 5.2 MMOL/L — SIGNIFICANT CHANGE UP (ref 3.5–5.3)
POTASSIUM SERPL-SCNC: 5.2 MMOL/L — SIGNIFICANT CHANGE UP (ref 3.5–5.3)
RBC # BLD: 4.33 M/UL — SIGNIFICANT CHANGE UP (ref 4.2–5.8)
RBC # FLD: 13.7 % — SIGNIFICANT CHANGE UP (ref 10.3–14.5)
SODIUM SERPL-SCNC: 135 MMOL/L — SIGNIFICANT CHANGE UP (ref 135–145)
WBC # BLD: 12.3 K/UL — HIGH (ref 3.8–10.5)
WBC # FLD AUTO: 12.3 K/UL — HIGH (ref 3.8–10.5)

## 2018-12-15 RX ORDER — DOCUSATE SODIUM 100 MG
100 CAPSULE ORAL THREE TIMES A DAY
Qty: 0 | Refills: 0 | Status: DISCONTINUED | OUTPATIENT
Start: 2018-12-15 | End: 2018-12-17

## 2018-12-15 RX ADMIN — HEPARIN SODIUM 5000 UNIT(S): 5000 INJECTION INTRAVENOUS; SUBCUTANEOUS at 06:28

## 2018-12-15 RX ADMIN — HEPARIN SODIUM 5000 UNIT(S): 5000 INJECTION INTRAVENOUS; SUBCUTANEOUS at 21:52

## 2018-12-15 RX ADMIN — HEPARIN SODIUM 5000 UNIT(S): 5000 INJECTION INTRAVENOUS; SUBCUTANEOUS at 13:59

## 2018-12-15 RX ADMIN — Medication 975 MILLIGRAM(S): at 08:30

## 2018-12-15 RX ADMIN — Medication 975 MILLIGRAM(S): at 09:00

## 2018-12-15 RX ADMIN — Medication 975 MILLIGRAM(S): at 02:34

## 2018-12-15 RX ADMIN — MEROPENEM 100 MILLIGRAM(S): 1 INJECTION INTRAVENOUS at 06:27

## 2018-12-15 RX ADMIN — Medication 975 MILLIGRAM(S): at 01:34

## 2018-12-15 RX ADMIN — Medication 100 MILLIGRAM(S): at 21:51

## 2018-12-15 RX ADMIN — MEROPENEM 100 MILLIGRAM(S): 1 INJECTION INTRAVENOUS at 21:58

## 2018-12-15 RX ADMIN — Medication 975 MILLIGRAM(S): at 20:42

## 2018-12-15 RX ADMIN — Medication 975 MILLIGRAM(S): at 14:15

## 2018-12-15 RX ADMIN — MEROPENEM 100 MILLIGRAM(S): 1 INJECTION INTRAVENOUS at 14:01

## 2018-12-15 RX ADMIN — Medication 975 MILLIGRAM(S): at 21:20

## 2018-12-15 RX ADMIN — SENNA PLUS 1 TABLET(S): 8.6 TABLET ORAL at 21:52

## 2018-12-15 RX ADMIN — Medication 975 MILLIGRAM(S): at 14:00

## 2018-12-15 NOTE — PROGRESS NOTE ADULT - SUBJECTIVE AND OBJECTIVE BOX
Red Team Surgery Progress Note    SUBJECTIVE: Patient seen and examined at the bedside. Patient tolerated new IR drain placement in GB fossa and re-positioning of previous drain overnight. Feeling well this morning. Tolerating regular diet without nausea or vomiting. Pain is well controlled. Ambulating well.     VITALS  T(C): 36.8 (12-15-18 @ 05:54), Max: 37.3 (12-14-18 @ 22:07)  HR: 84 (12-15-18 @ 05:54) (84 - 97)  BP: 104/69 (12-15-18 @ 05:54) (104/69 - 122/71)  RR: 18 (12-15-18 @ 05:54) (16 - 18)  SpO2: 99% (12-15-18 @ 05:54) (95% - 99%)    Is/Os    12-14 @ 07:01  -  12-15 @ 07:00  --------------------------------------------------------  IN:    Oral Fluid: 480 mL  Total IN: 480 mL    OUT:    Drain: 45 mL    Drain: 45 mL    Voided: 1575 mL  Total OUT: 1665 mL    Total NET: -1185 mL    PHYSICAL EXAM:   General: NAD, Lying in bed comfortably, alert, oriented x3  Pulm: Non-labored breathing on RA  GI/Abd: Soft, NT, distended, no rebound/guarding, right REMI drain in place and collecting bile-tinged serous fluid, anterior right REMI drain in place collecting murky serosanguinous fluid, old lap sites and old drain site healing well    MEDICATIONS (STANDING): acetaminophen   Tablet .. 975 milliGRAM(s) Oral every 6 hours  docusate sodium 100 milliGRAM(s) Oral three times a day  heparin  Injectable 5000 Unit(s) SubCutaneous every 8 hours  meropenem  IVPB      meropenem  IVPB 1000 milliGRAM(s) IV Intermittent every 8 hours  senna 1 Tablet(s) Oral daily    MEDICATIONS (PRN):oxyCODONE    IR 10 milliGRAM(s) Oral every 6 hours PRN Severe Pain (7 - 10)  oxyCODONE    IR 5 milliGRAM(s) Oral every 6 hours PRN Moderate Pain (4 - 6)    LABS  CBC (12-15 @ 07:07)                              11.6<L>                         12.3<H>  )----------------(  579<H>     --    % Neutrophils, --    % Lymphocytes, ANC: --                                  35.9<L>  CBC (12-14 @ 07:08)                              12.2<L>                         13.5<H>  )----------------(  589<H>     --    % Neutrophils, --    % Lymphocytes, ANC: --                                  38.8<L>    BMP (12-15 @ 07:07)             135     |  98      |  15    		Ca++ --      Ca 9.2                ---------------------------------( 93    		Mg 2.5                5.2     |  26      |  0.51  			Ph 3.4     BMP (12-14 @ 07:08)             136     |  98      |  14    		Ca++ --      Ca 9.3                ---------------------------------( 91    		Mg 2.4                4.4     |  27      |  0.50  			Ph 3.0       LFTs (12-14 @ 07:08)      TPro 8.6<H> / Alb 3.3 / TBili 0.4 / DBili 0.1 / AST 16 / ALT 32 / AlkPhos 169<H>    Coags (12-14 @ 07:08)  aPTT 31.0 / INR 1.21<H> / PT 13.9<H>

## 2018-12-15 NOTE — PROGRESS NOTE ADULT - SUBJECTIVE AND OBJECTIVE BOX
INTERVAL HPI/OVERNIGHT EVENTS:  No new overnight event.  No N/V/D.  Tolerating diet.  still with ruq pain    Allergies    adhesives (Rash)  cortisone (Rash)    Intolerances  General:  No wt loss, fevers, chills, night sweats, fatigue,   Eyes:  Good vision, no reported pain  ENT:  No sore throat, pain, runny nose, dysphagia  CV:  No pain, palpitations, hypo/hypertension  Resp:  No dyspnea, cough, tachypnea, wheezing  GI:  No pain, No nausea, No vomiting, No diarrhea, No constipation, No weight loss, No fever, No pruritis, No rectal bleeding, No tarry stools, No dysphagia,  :  No pain, bleeding, incontinence, nocturia  Muscle:  No pain, weakness  Neuro:  No weakness, tingling, memory problems  Psych:  No fatigue, insomnia, mood problems, depression  Endocrine:  No polyuria, polydipsia, cold/heat intolerance  Heme:  No petechiae, ecchymosis, easy bruisability  Skin:  No rash, tattoos, scars, edema      PHYSICAL EXAM:   Vital Signs:  Vital Signs Last 24 Hrs  T(C): 36.9 (15 Dec 2018 13:55), Max: 37.3 (14 Dec 2018 22:07)  T(F): 98.4 (15 Dec 2018 13:55), Max: 99.2 (14 Dec 2018 22:07)  HR: 86 (15 Dec 2018 13:55) (81 - 97)  BP: 111/66 (15 Dec 2018 13:55) (104/69 - 122/71)  BP(mean): --  RR: 17 (15 Dec 2018 13:55) (16 - 18)  SpO2: 97% (15 Dec 2018 13:55) (97% - 99%)  Daily     Daily I&O's Summary    14 Dec 2018 07:01  -  15 Dec 2018 07:00  --------------------------------------------------------  IN: 480 mL / OUT: 1665 mL / NET: -1185 mL    15 Dec 2018 07:01  -  15 Dec 2018 14:02  --------------------------------------------------------  IN: 240 mL / OUT: 156 mL / NET: 84 mL        GENERAL:  Appears stated age, well-groomed, well-nourished, no distress  HEENT:  NC/AT,  conjunctivae clear and pink, no thyromegaly, nodules, adenopathy, no JVD, sclera -anicteric  CHEST:  Full & symmetric excursion, no increased effort, breath sounds clear  HEART:  Regular rhythm, S1, S2, no murmur/rub/S3/S4, no abdominal bruit, no edema  ABDOMEN:  Soft, non-tender, non-distended, normoactive bowel sounds,  no masses ,no hepato-splenomegaly, no signs of chronic liver disease  EXTEREMITIES:  no cyanosis,clubbing or edema  SKIN:  No rash/erythema/ecchymoses/petechiae/wounds/abscess/warm/dry  NEURO:  Alert, oriented, no asterixis, no tremor, no encephalopathy      LABS:                        11.6   12.3  )-----------( 579      ( 15 Dec 2018 07:07 )             35.9     12-15    135  |  98  |  15  ----------------------------<  93  5.2   |  26  |  0.51    Ca    9.2      15 Dec 2018 07:07  Phos  3.4     12-15  Mg     2.5     12-15    TPro  8.6<H>  /  Alb  3.3  /  TBili  0.4  /  DBili  0.1  /  AST  16  /  ALT  32  /  AlkPhos  169<H>  12-14    PT/INR - ( 14 Dec 2018 07:08 )   PT: 13.9 sec;   INR: 1.21 ratio         PTT - ( 14 Dec 2018 07:08 )  PTT:31.0 sec    amylase   lipase  RADIOLOGY & ADDITIONAL TESTS:

## 2018-12-15 NOTE — PROGRESS NOTE ADULT - ATTENDING COMMENTS
Patient seen and examined. Agree with the above.    He remains comfortable, denies nausea, vomiting. He has minimal pain near the new drain.    His abdomen is soft, not distended, and minimally tender near the new drain site. REMI #1 with scant bile tinged output, REMI#2 with some thicker serosanguinous output.    38 year old man s/p lap patrick at outside hospital, now s/p IR drain x2.   1. Continue regular diet  2. Follow up cultures  3. Appreciate ID recommendations regarding antibiotics, await final recs pending cultures.

## 2018-12-15 NOTE — PROGRESS NOTE ADULT - ASSESSMENT
38M PPD7 from US and fluoroscopy guided IR placement of right upper quadrant perihepatic drain, PPD6 from ERCP for bile leak with sphincterotomy and stent placement, PPD5 s/p Abdominal tube check and exchange, upsizing and repositioning with IR, and now PPD1 s/p new right anterior abdominal drain in GB fossa placement and repositioning of previous drain    - Cont low fat diet  - Pain control prn  - Chest PT  - Cont meropenem per ID recs, f/u any new recs  - Follow up blood cx - no growth to date  - dvt ppx, oob/ambulate as tolerated  - Will likely need long term IV antibiotics - PICC planning

## 2018-12-16 ENCOUNTER — TRANSCRIPTION ENCOUNTER (OUTPATIENT)
Age: 38
End: 2018-12-16

## 2018-12-16 LAB
ALBUMIN SERPL ELPH-MCNC: 3.3 G/DL — SIGNIFICANT CHANGE UP (ref 3.3–5)
ALP SERPL-CCNC: 146 U/L — HIGH (ref 40–120)
ALT FLD-CCNC: 29 U/L — SIGNIFICANT CHANGE UP (ref 10–45)
ANION GAP SERPL CALC-SCNC: 14 MMOL/L — SIGNIFICANT CHANGE UP (ref 5–17)
AST SERPL-CCNC: 10 U/L — SIGNIFICANT CHANGE UP (ref 10–40)
BILIRUB DIRECT SERPL-MCNC: 0.1 MG/DL — SIGNIFICANT CHANGE UP (ref 0–0.2)
BILIRUB INDIRECT FLD-MCNC: 0.3 MG/DL — SIGNIFICANT CHANGE UP (ref 0.2–1)
BILIRUB SERPL-MCNC: 0.4 MG/DL — SIGNIFICANT CHANGE UP (ref 0.2–1.2)
BUN SERPL-MCNC: 17 MG/DL — SIGNIFICANT CHANGE UP (ref 7–23)
CALCIUM SERPL-MCNC: 8.8 MG/DL — SIGNIFICANT CHANGE UP (ref 8.4–10.5)
CHLORIDE SERPL-SCNC: 97 MMOL/L — SIGNIFICANT CHANGE UP (ref 96–108)
CO2 SERPL-SCNC: 25 MMOL/L — SIGNIFICANT CHANGE UP (ref 22–31)
CREAT SERPL-MCNC: 0.5 MG/DL — SIGNIFICANT CHANGE UP (ref 0.5–1.3)
GLUCOSE SERPL-MCNC: 105 MG/DL — HIGH (ref 70–99)
HCT VFR BLD CALC: 38.9 % — LOW (ref 39–50)
HGB BLD-MCNC: 11.3 G/DL — LOW (ref 13–17)
MAGNESIUM SERPL-MCNC: 2.4 MG/DL — SIGNIFICANT CHANGE UP (ref 1.6–2.6)
MCHC RBC-ENTMCNC: 24.1 PG — LOW (ref 27–34)
MCHC RBC-ENTMCNC: 29.1 GM/DL — LOW (ref 32–36)
MCV RBC AUTO: 82.7 FL — SIGNIFICANT CHANGE UP (ref 80–100)
PHOSPHATE SERPL-MCNC: 2.9 MG/DL — SIGNIFICANT CHANGE UP (ref 2.5–4.5)
PLATELET # BLD AUTO: 668 K/UL — HIGH (ref 150–400)
POTASSIUM SERPL-MCNC: 4.2 MMOL/L — SIGNIFICANT CHANGE UP (ref 3.5–5.3)
POTASSIUM SERPL-SCNC: 4.2 MMOL/L — SIGNIFICANT CHANGE UP (ref 3.5–5.3)
PROT SERPL-MCNC: 8.3 G/DL — SIGNIFICANT CHANGE UP (ref 6–8.3)
RBC # BLD: 4.7 M/UL — SIGNIFICANT CHANGE UP (ref 4.2–5.8)
RBC # FLD: 13.1 % — SIGNIFICANT CHANGE UP (ref 10.3–14.5)
SODIUM SERPL-SCNC: 136 MMOL/L — SIGNIFICANT CHANGE UP (ref 135–145)
WBC # BLD: 11.7 K/UL — HIGH (ref 3.8–10.5)
WBC # FLD AUTO: 11.7 K/UL — HIGH (ref 3.8–10.5)

## 2018-12-16 PROCEDURE — 99232 SBSQ HOSP IP/OBS MODERATE 35: CPT

## 2018-12-16 RX ORDER — FLUCONAZOLE 150 MG/1
200 TABLET ORAL EVERY 24 HOURS
Qty: 0 | Refills: 0 | Status: DISCONTINUED | OUTPATIENT
Start: 2018-12-16 | End: 2018-12-16

## 2018-12-16 RX ORDER — FLUCONAZOLE 150 MG/1
200 TABLET ORAL EVERY 24 HOURS
Qty: 0 | Refills: 0 | Status: DISCONTINUED | OUTPATIENT
Start: 2018-12-16 | End: 2018-12-17

## 2018-12-16 RX ADMIN — Medication 975 MILLIGRAM(S): at 19:22

## 2018-12-16 RX ADMIN — Medication 975 MILLIGRAM(S): at 02:29

## 2018-12-16 RX ADMIN — HEPARIN SODIUM 5000 UNIT(S): 5000 INJECTION INTRAVENOUS; SUBCUTANEOUS at 06:47

## 2018-12-16 RX ADMIN — HEPARIN SODIUM 5000 UNIT(S): 5000 INJECTION INTRAVENOUS; SUBCUTANEOUS at 14:55

## 2018-12-16 RX ADMIN — Medication 100 MILLIGRAM(S): at 21:45

## 2018-12-16 RX ADMIN — Medication 975 MILLIGRAM(S): at 07:13

## 2018-12-16 RX ADMIN — Medication 100 MILLIGRAM(S): at 06:47

## 2018-12-16 RX ADMIN — Medication 975 MILLIGRAM(S): at 08:13

## 2018-12-16 RX ADMIN — MEROPENEM 100 MILLIGRAM(S): 1 INJECTION INTRAVENOUS at 06:48

## 2018-12-16 RX ADMIN — MEROPENEM 100 MILLIGRAM(S): 1 INJECTION INTRAVENOUS at 14:56

## 2018-12-16 RX ADMIN — SENNA PLUS 1 TABLET(S): 8.6 TABLET ORAL at 21:45

## 2018-12-16 RX ADMIN — HEPARIN SODIUM 5000 UNIT(S): 5000 INJECTION INTRAVENOUS; SUBCUTANEOUS at 21:45

## 2018-12-16 RX ADMIN — Medication 975 MILLIGRAM(S): at 01:29

## 2018-12-16 RX ADMIN — MEROPENEM 100 MILLIGRAM(S): 1 INJECTION INTRAVENOUS at 21:45

## 2018-12-16 RX ADMIN — Medication 975 MILLIGRAM(S): at 19:52

## 2018-12-16 NOTE — PROGRESS NOTE ADULT - SUBJECTIVE AND OBJECTIVE BOX
INTERVAL HPI/OVERNIGHT EVENTS:  No new overnight event.  No N/V/D.  Tolerating diet.      Allergies    adhesives (Rash)  cortisone (Rash)    Intolerances          General:  No wt loss, fevers, chills, night sweats, fatigue,   Eyes:  Good vision, no reported pain  ENT:  No sore throat, pain, runny nose, dysphagia  CV:  No pain, palpitations, hypo/hypertension  Resp:  No dyspnea, cough, tachypnea, wheezing  GI:  No pain, No nausea, No vomiting, No diarrhea, No constipation, No weight loss, No fever, No pruritis, No rectal bleeding, No tarry stools, No dysphagia,  :  No pain, bleeding, incontinence, nocturia  Muscle:  No pain, weakness  Neuro:  No weakness, tingling, memory problems  Psych:  No fatigue, insomnia, mood problems, depression  Endocrine:  No polyuria, polydipsia, cold/heat intolerance  Heme:  No petechiae, ecchymosis, easy bruisability  Skin:  No rash, tattoos, scars, edema      PHYSICAL EXAM:   Vital Signs:  Vital Signs Last 24 Hrs  T(C): 37.2 (16 Dec 2018 09:15), Max: 37.4 (16 Dec 2018 01:20)  T(F): 99 (16 Dec 2018 09:15), Max: 99.3 (16 Dec 2018 01:20)  HR: 75 (16 Dec 2018 09:15) (75 - 90)  BP: 108/68 (16 Dec 2018 09:15) (108/68 - 120/74)  BP(mean): --  RR: 18 (16 Dec 2018 09:15) (17 - 18)  SpO2: 97% (16 Dec 2018 09:15) (97% - 98%)  Daily     Daily I&O's Summary    15 Dec 2018 07:01  -  16 Dec 2018 07:00  --------------------------------------------------------  IN: 500 mL / OUT: 621 mL / NET: -121 mL        GENERAL:  Appears stated age, well-groomed, well-nourished, no distress  HEENT:  NC/AT,  conjunctivae clear and pink, no thyromegaly, nodules, adenopathy, no JVD, sclera -anicteric  CHEST:  Full & symmetric excursion, no increased effort, breath sounds clear  HEART:  Regular rhythm, S1, S2, no murmur/rub/S3/S4, no abdominal bruit, no edema  ABDOMEN:  Soft, non-tender, non-distended, normoactive bowel sounds,  no masses ,no hepato-splenomegaly, no signs of chronic liver disease  EXTEREMITIES:  no cyanosis,clubbing or edema  SKIN:  No rash/erythema/ecchymoses/petechiae/wounds/abscess/warm/dry  NEURO:  Alert, oriented, no asterixis, no tremor, no encephalopathy      LABS:                        11.3   11.7  )-----------( 668      ( 16 Dec 2018 06:52 )             38.9     12-16    136  |  97  |  17  ----------------------------<  105<H>  4.2   |  25  |  0.50    Ca    8.8      16 Dec 2018 06:52  Phos  2.9     12-16  Mg     2.4     12-16    TPro  8.3  /  Alb  3.3  /  TBili  0.4  /  DBili  0.1  /  AST  10  /  ALT  29  /  AlkPhos  146<H>  12-16        amylase   lipase  RADIOLOGY & ADDITIONAL TESTS:

## 2018-12-16 NOTE — DISCHARGE NOTE ADULT - PROVIDER TOKENS
TOKEN:'[id number]' TOKEN:'[id number]',TOKEN:'[id number]',TOKEN:'[id number]',TOKEN:'[id number]' TOKEN:'[id number]',TOKEN:'[id number]',TOKEN:'[id number]',TOKEN:'[id number]'

## 2018-12-16 NOTE — DISCHARGE NOTE ADULT - CARE PROVIDERS DIRECT ADDRESSES
,[email or web address] ,[email or web address],[email or web address],[email or web address],DirectAddress_Unknown ,DirectAddress_Unknown,[email or web address],DirectAddress_Unknown,[email or web address]

## 2018-12-16 NOTE — PROGRESS NOTE ADULT - ASSESSMENT
38M w recent chronic cholecystitis/cholelithiasis s/p laparascopic cholecystostomy tube and eventual laparoscopic cholecystectomy (12/3/18) complicated by biliary leak and collection.  S/p drainage.  New cultures with candida albicans which is usually susceptible to azoles.  Leukocytosis better.  Fever resolved.    Suggest:  - continue IV antibiotics  - add diflucan 200mg daily

## 2018-12-16 NOTE — DISCHARGE NOTE ADULT - ADDITIONAL INSTRUCTIONS
Please follow up with Dr. Sheikh GASCA in 1-2 weeks (See below for office information to call for an appointment)   Please follow up with IR Dr. Ji Atkins in 10-14 days for imaging and tube check (See below for office information to call for an appointment)  Please follow up with Dr. Talha PLATA in 1-2 weeks - Call [phone] for appointment Please follow up with Dr. Sheikh GASCA in 1-2 weeks (See below for office information to call for an appointment)   Please follow up with IR Dr. Ji Atkins in about 10-14 days for imaging and tube check or when drain output is 10cc or less (See below for office information to call for an appointment)  Please follow up with Dr. Talha PLATA in 1-2 weeks - Call [phone] for appointment

## 2018-12-16 NOTE — DISCHARGE NOTE ADULT - OTHER SIGNIFICANT FINDINGS
38M w/ PMH of CHRISTINE, chronic cholecystitis/cholelithiasis s/p laparascopic cholecystostomy tube placement by Dr. Pritchett in August 2018 (Malencot drain placed in gallbladder and REMI drain left in liver/sidewall interface) and now s/p laparoscopic cholecystectomy 12/3/18 w/ Dr. Pritchett at [facility]. He tolerated procedure well and was sent home same day, however returned to [facility] ED later that night with fevers, chills, and worsening abdominal pain. He was tachycardic, tachypneic, had a low grade temperature (~100F) with abdominal tenderness. He was started on IVF and zosyn. CT A/P was suspicious for bile leak. Prior to CT scan, a rapid response was called for tachypnea and chest pain (troponin level <0.17). Vitals were stable during rapid response (SBP in 170s - elevated due to pain, saturating in high 90s on room air, tachycardic to 110s - due to pain, and afebrile). Patient was then [facility] drainage of bile.    On admission to [facility], patient is complaining of abdominal pain worst in RUQ and right shoulder pain with deep breathing, fevers and chills, and constipation. His last oral intake was contrast for the CT scan performed at 10 am this morning. He denies chest pain. He has had intermittent nausea, no vomiting. He has not passed a bowel movement in the last two days.    On HD1, patient underwent US and fluoroscopically guided right upper quadrant chrystal-hepatic drain placement with IR. On HD2, patient underwent ERCP with sphincterotomy and stent placement.     On HD5, patient underwent abdominal tube check and exchange, upsizing and repositioning with IR. Repeat CTAP on HD8 showed air and fluid collection in the gallbladder fossa which has increased in size so IR was reconsulted and the patient underwent placement of 10 Fr drain in gallbladder fossa fluid collection.

## 2018-12-16 NOTE — DISCHARGE NOTE ADULT - MEDICATION SUMMARY - MEDICATIONS TO STOP TAKING
I will STOP taking the medications listed below when I get home from the hospital:    oxyCODONE-acetaminophen 5 mg-325 mg oral tablet  -- 1 tab(s) by mouth every 4 hours, As needed, Moderate Pain (4 - 6)    morphine 4 mg/mL preservative-free intravenous solution  -- 1 milliliter(s) intravenous every 3 hours, As needed, Severe Pain (7 - 10)    magnesium hydroxide 8% oral suspension  -- 30 milliliter(s) by mouth once, As needed, Constipation    ondansetron 2 mg/mL injectable solution  -- 4 milligram(s) injectable every 6 hours, As Needed nausea and/or vomiting    polyethylene glycol 3350 oral powder for reconstitution  -- 17 gram(s) by mouth once a day    piperacillin-tazobactam 2 g-0.25 g intravenous injection  -- 3.375 gram(s) intravenous every 8 hours for bile leak I will STOP taking the medications listed below when I get home from the hospital:  None

## 2018-12-16 NOTE — DISCHARGE NOTE ADULT - INSTRUCTIONS
Continue a low fat diet If unable to tolerate diet, nausea, vomiting, fever above 100.3 , chills, or an increase in pain, notify provider or return to ER.

## 2018-12-16 NOTE — DISCHARGE NOTE ADULT - CARE PLAN
Principal Discharge DX:	Bile leak, postoperative  Goal:	Drainage of bile leak, recovery from procedure, diet tolerance, and pain control  Assessment and plan of treatment:	WOUND CARE: You will be discharged with REMI drains. You will need to empty them and record outputs accurately. This will be taught to you by the nursing staff. Please do not remove the REMI drains. They will be removed in the office. Please bring to the office accurate records of output.   BATHING: Please do not submerge wound underwater. You may shower and/or sponge bathe.  ACTIVITY: No heavy lifting or straining. Please see Dr. Chen in the office prior to returning to work. Otherwise, you may return to your usual level of physical activity.   DIET: Continue a low fat diet  NOTIFY YOUR SURGEON IF: You have any bleeding that does not stop, any pus draining from your wound(s), any fever (over 100.4 F) or chills, persistent nausea/vomiting, persistent diarrhea, or if your pain is not controlled on your discharge pain medications.  FOLLOW-UP:  Please follow-up with Dr. Chen in the office in two weeks from discharge. Please call the office at [phone] to make an appointment.  Please follow up with your primary care physician in one week regarding your hospitalization Principal Discharge DX:	Bile leak, postoperative  Goal:	Drainage of bile leak, recovery from procedure, diet tolerance, and pain control  Assessment and plan of treatment:	WOUND CARE: You will be discharged with REMI drains. You will need to empty them and record outputs accurately. This will be taught to you by the nursing staff. Please do not remove the RMEI drains. They will be removed in the office. Please bring to the office accurate records of output.  Please flush drains with 5cc of normal saline daily.   BATHING: Please do not submerge wound underwater. You may shower and/or sponge bathe.  ACTIVITY: No heavy lifting or straining. Please see Dr. Chen in the office prior to returning to work. Otherwise, you may return to your usual level of physical activity.   DIET: Continue a low fat diet  NOTIFY YOUR SURGEON IF: You have any bleeding that does not stop, any pus draining from your wound(s), any fever (over 100.4 F) or chills, persistent nausea/vomiting, persistent diarrhea, or if your pain is not controlled on your discharge pain medications.  FOLLOW-UP:  Please follow-up with Dr. Pritchett in the office in two weeks from discharge. Please call the office to make an appointment.  Please follow up with your primary care physician in one week regarding your hospitalization Principal Discharge DX:	Bile leak, postoperative  Goal:	Drainage of bile leak, recovery from procedure, diet tolerance, and pain control  Assessment and plan of treatment:	WOUND CARE: You will be discharged with REMI drains. You will need to empty them and record outputs accurately. This will be taught to you by the nursing staff. Please do not remove the REMI drains. They will be removed in the office. Please bring to the office accurate records of output.  Please flush drains with 5cc of normal saline daily. When drain output around 10cc or less over 24 hours, call to schedule appointment with IR.    BATHING: Please do not submerge wound underwater. You may shower and/or sponge bathe.  ACTIVITY: No heavy lifting or straining. Please see Dr. Chen in the office prior to returning to work. Otherwise, you may return to your usual level of physical activity.   DIET: Continue a low fat diet  NOTIFY YOUR SURGEON IF: You have any bleeding that does not stop, any pus draining from your wound(s), any fever (over 100.4 F) or chills, persistent nausea/vomiting, persistent diarrhea, or if your pain is not controlled on your discharge pain medications.  FOLLOW-UP:  Please follow-up with Dr. Pritchett in the office in two weeks from discharge. Please call the office to make an appointment.  Please follow up with your primary care physician in one week regarding your hospitalization

## 2018-12-16 NOTE — PROGRESS NOTE ADULT - ATTENDING COMMENTS
Patient seen and examined. Agree with the above.   Appreciate ID recommendations to start diflucan. Patient seen and examined. Agree with the above.    He is comfortable in bed, NAD, AOx3  His abdomen is soft, not distended, and tender only at the drain sites. The initial drain is serous, and the second drain is serosanguinous.     Continue regular diet  Appreciate ID recommendations

## 2018-12-16 NOTE — DISCHARGE NOTE ADULT - MEDICATION SUMMARY - MEDICATIONS TO TAKE
I will START or STAY ON the medications listed below when I get home from the hospital:    oxyCODONE 5 mg oral tablet  -- 1 tab(s) by mouth every 6 hours, As needed, Moderate Pain (4 - 6) MDD:4  -- Indication: For pain    acetaminophen 325 mg oral tablet  -- 2 tab(s) by mouth every 6 hours, As needed  -- Indication: For pain    fluconazole 200 mg oral tablet  -- 1 tab(s) by mouth once a day  -- Indication: For collection    docusate sodium 100 mg oral capsule  -- 1 cap(s) by mouth 3 times a day  -- Indication: For prevent constipation    senna oral tablet  -- 1 tab(s) by mouth once a day  -- Indication: For prevent constipation    ciprofloxacin 500 mg oral tablet  -- 1 tab(s) by mouth every 12 hours  -- Indication: For collection I will START or STAY ON the medications listed below when I get home from the hospital:    oxyCODONE 5 mg oral tablet  -- 1 tab(s) by mouth every 6 hours, As needed, Moderate Pain (4 - 6) MDD:4  -- Indication: For pain control    acetaminophen 325 mg oral tablet  -- 2 tab(s) by mouth every 6 hours, As needed  -- Indication: For pain control    fluconazole 200 mg oral tablet  -- 1 tab(s) by mouth once a day  -- Indication: For infection control     docusate sodium 100 mg oral capsule  -- 1 cap(s) by mouth 3 times a day  -- Indication: For home med    senna oral tablet  -- 1 tab(s) by mouth once a day  -- Indication: For home med    ciprofloxacin 500 mg oral tablet  -- 1 tab(s) by mouth every 12 hours  -- Indication: For infection

## 2018-12-16 NOTE — PROGRESS NOTE ADULT - SUBJECTIVE AND OBJECTIVE BOX
Red Team Surgery Progress Note    SUBJECTIVE: Patient seen and examined at the bedside. No acute events overnight. Feeling well this morning. Tolerating regular diet without nausea or vomiting. Pain is well controlled. Has passed flatus and bowel movements. Ambulating well.     VITALS  T(C): 37.4 (12-16-18 @ 01:20), Max: 37.4 (12-16-18 @ 01:20)  HR: 90 (12-16-18 @ 01:20) (77 - 90)  BP: 109/65 (12-16-18 @ 01:20) (109/65 - 120/74)  RR: 18 (12-16-18 @ 01:20) (17 - 18)  SpO2: 98% (12-16-18 @ 01:20) (97% - 98%)    Is/Os    12-14 @ 07:01  -  12-15 @ 07:00  --------------------------------------------------------  IN:    Oral Fluid: 480 mL  Total IN: 480 mL    OUT:    Drain: 45 mL    Drain: 45 mL    Voided: 1575 mL  Total OUT: 1665 mL    Total NET: -1185 mL      12-15 @ 07:01  -  12-16 @ 06:38  --------------------------------------------------------  IN:    Oral Fluid: 500 mL  Total IN: 500 mL    OUT:    Drain: 45 mL    Drain: 51 mL    Voided: 225 mL  Total OUT: 321 mL    Total NET: 179 mL    PHYSICAL EXAM:   General: NAD, Lying in bed comfortably, alert, oriented x3  Pulm: Non-labored breathing on RA  GI/Abd: Soft, NT, distended, no rebound/guarding, right REMI drain in place and collecting bile-tinged serous fluid, anterior right REMI drain in place collecting murky serosanguinous fluid, old lap sites and old drain site healing well     MEDICATIONS (STANDING): acetaminophen   Tablet .. 975 milliGRAM(s) Oral every 6 hours  docusate sodium 100 milliGRAM(s) Oral three times a day  heparin  Injectable 5000 Unit(s) SubCutaneous every 8 hours  meropenem  IVPB      meropenem  IVPB 1000 milliGRAM(s) IV Intermittent every 8 hours  senna 1 Tablet(s) Oral daily    MEDICATIONS (PRN):oxyCODONE    IR 10 milliGRAM(s) Oral every 6 hours PRN Severe Pain (7 - 10)  oxyCODONE    IR 5 milliGRAM(s) Oral every 6 hours PRN Moderate Pain (4 - 6)    LABS  CBC (12-15 @ 07:07)                              11.6<L>                         12.3<H>  )----------------(  579<H>     --    % Neutrophils, --    % Lymphocytes, ANC: --                                  35.9<L>    BMP (12-15 @ 07:07)             135     |  98      |  15    		Ca++ --      Ca 9.2                ---------------------------------( 93    		Mg 2.5                5.2     |  26      |  0.51  			Ph 3.4         Blood cultures consistently negative  Body fluid culture from 12/6 showed rare serratia and rare pseudomonas  Abscess drainage from 12/14 showed rare Candida Red Team Surgery Progress Note    SUBJECTIVE: Patient seen and examined at the bedside. No acute events overnight. Feeling well this morning. Tolerating regular diet without nausea or vomiting. Pain is well controlled. Has passed flatus and bowel movements. Ambulating well.     VITALS  T(C): 37.4 (12-16-18 @ 01:20), Max: 37.4 (12-16-18 @ 01:20)  HR: 90 (12-16-18 @ 01:20) (77 - 90)  BP: 109/65 (12-16-18 @ 01:20) (109/65 - 120/74)  RR: 18 (12-16-18 @ 01:20) (17 - 18)  SpO2: 98% (12-16-18 @ 01:20) (97% - 98%)    Is/Os    12-14 @ 07:01  -  12-15 @ 07:00  --------------------------------------------------------  IN:    Oral Fluid: 480 mL  Total IN: 480 mL    OUT:    Drain: 45 mL    Drain: 45 mL    Voided: 1575 mL  Total OUT: 1665 mL    Total NET: -1185 mL      12-15 @ 07:01  -  12-16 @ 06:38  --------------------------------------------------------  IN:    Oral Fluid: 500 mL  Total IN: 500 mL    OUT:    Drain: 45 mL    Drain: 51 mL    Voided: 225 mL  Total OUT: 321 mL    Total NET: 179 mL    PHYSICAL EXAM:   General: NAD, Lying in bed comfortably, alert, oriented x3  Pulm: Non-labored breathing on RA  GI/Abd: Soft, NT, distended, no rebound/guarding, right REMI drain in place and collecting bile-tinged serous fluid, anterior right REMI drain in place collecting serosanguinous fluid, old lap sites and old drain site healing well     MEDICATIONS (STANDING): acetaminophen   Tablet .. 975 milliGRAM(s) Oral every 6 hours  docusate sodium 100 milliGRAM(s) Oral three times a day  heparin  Injectable 5000 Unit(s) SubCutaneous every 8 hours  meropenem  IVPB      meropenem  IVPB 1000 milliGRAM(s) IV Intermittent every 8 hours  senna 1 Tablet(s) Oral daily    MEDICATIONS (PRN):oxyCODONE    IR 10 milliGRAM(s) Oral every 6 hours PRN Severe Pain (7 - 10)  oxyCODONE    IR 5 milliGRAM(s) Oral every 6 hours PRN Moderate Pain (4 - 6)    LABS  CBC (12-15 @ 07:07)                              11.6<L>                         12.3<H>  )----------------(  579<H>     --    % Neutrophils, --    % Lymphocytes, ANC: --                                  35.9<L>    BMP (12-15 @ 07:07)             135     |  98      |  15    		Ca++ --      Ca 9.2                ---------------------------------( 93    		Mg 2.5                5.2     |  26      |  0.51  			Ph 3.4         Blood cultures consistently negative  Body fluid culture from 12/6 showed rare serratia and rare pseudomonas  Abscess drainage from 12/14 showed rare Candida

## 2018-12-16 NOTE — DISCHARGE NOTE ADULT - PLAN OF CARE
Drainage of bile leak, recovery from procedure, diet tolerance, and pain control WOUND CARE: You will be discharged with REMI drains. You will need to empty them and record outputs accurately. This will be taught to you by the nursing staff. Please do not remove the REMI drains. They will be removed in the office. Please bring to the office accurate records of output.   BATHING: Please do not submerge wound underwater. You may shower and/or sponge bathe.  ACTIVITY: No heavy lifting or straining. Please see Dr. Chen in the office prior to returning to work. Otherwise, you may return to your usual level of physical activity.   DIET: Continue a low fat diet  NOTIFY YOUR SURGEON IF: You have any bleeding that does not stop, any pus draining from your wound(s), any fever (over 100.4 F) or chills, persistent nausea/vomiting, persistent diarrhea, or if your pain is not controlled on your discharge pain medications.  FOLLOW-UP:  Please follow-up with Dr. Chen in the office in two weeks from discharge. Please call the office at [phone] to make an appointment.  Please follow up with your primary care physician in one week regarding your hospitalization WOUND CARE: You will be discharged with REMI drains. You will need to empty them and record outputs accurately. This will be taught to you by the nursing staff. Please do not remove the REMI drains. They will be removed in the office. Please bring to the office accurate records of output.  Please flush drains with 5cc of normal saline daily.   BATHING: Please do not submerge wound underwater. You may shower and/or sponge bathe.  ACTIVITY: No heavy lifting or straining. Please see Dr. Chen in the office prior to returning to work. Otherwise, you may return to your usual level of physical activity.   DIET: Continue a low fat diet  NOTIFY YOUR SURGEON IF: You have any bleeding that does not stop, any pus draining from your wound(s), any fever (over 100.4 F) or chills, persistent nausea/vomiting, persistent diarrhea, or if your pain is not controlled on your discharge pain medications.  FOLLOW-UP:  Please follow-up with Dr. Pritchett in the office in two weeks from discharge. Please call the office to make an appointment.  Please follow up with your primary care physician in one week regarding your hospitalization WOUND CARE: You will be discharged with REMI drains. You will need to empty them and record outputs accurately. This will be taught to you by the nursing staff. Please do not remove the REMI drains. They will be removed in the office. Please bring to the office accurate records of output.  Please flush drains with 5cc of normal saline daily. When drain output around 10cc or less over 24 hours, call to schedule appointment with IR.    BATHING: Please do not submerge wound underwater. You may shower and/or sponge bathe.  ACTIVITY: No heavy lifting or straining. Please see Dr. Chen in the office prior to returning to work. Otherwise, you may return to your usual level of physical activity.   DIET: Continue a low fat diet  NOTIFY YOUR SURGEON IF: You have any bleeding that does not stop, any pus draining from your wound(s), any fever (over 100.4 F) or chills, persistent nausea/vomiting, persistent diarrhea, or if your pain is not controlled on your discharge pain medications.  FOLLOW-UP:  Please follow-up with Dr. Pritchett in the office in two weeks from discharge. Please call the office to make an appointment.  Please follow up with your primary care physician in one week regarding your hospitalization

## 2018-12-16 NOTE — PROGRESS NOTE ADULT - SUBJECTIVE AND OBJECTIVE BOX
f/u fever, biliary leak and leukocytosis    Interval History/ROS:  no fever/chills.  no n/v/d.  he is feeling much better since placement of the drain.  tolerating PO.  Rest of ROS otherwise negative.    Allergies  adhesives (Rash)  cortisone (Rash)    ANTIBIOTICS/RELEVANT:   meropenem  IVPB 1000 every 8 hours    MEDICATIONS  (STANDING):  acetaminophen   Tablet .. 975 every 6 hours  docusate sodium 100 three times a day  heparin  Injectable 5000 every 8 hours  senna 1 daily    Vital Signs Last 24 Hrs  T(F): 99 (12-16-18 @ 09:15), Max: 99.3 (12-16-18 @ 01:20)  HR: 75 (12-16-18 @ 09:15)  BP: 108/68 (12-16-18 @ 09:15)  RR: 18 (12-16-18 @ 09:15)  SpO2: 97% (12-16-18 @ 09:15) (97% - 98%)    PHYSICAL EXAM:  General: non-toxic, sitting in chair having breakfast (seen earlier in the morning)  HEAD/EYES: anicteric  ENT:  supple  Cardiovascular:   S1, S2  Respiratory:  clear bilaterally  GI:  soft, non-tender, normal bowel sounds; two drains  :  no moeller  Musculoskeletal:  no synovitis  Neurologic:  grossly non-focal  Skin:  no rash  Psychiatric:  appropriate affect  Vascular:  no phlebitis    WBC Count: 11.7 (12-16-18 @ 06:52)  WBC Count: 12.3 (12-15-18 @ 07:07)  WBC Count: 13.5 (12-14-18 @ 07:08)  WBC Count: 18.3 (12-13-18 @ 07:16)  WBC Count: 17.9 (12-12-18 @ 06:42)  WBC Count: 18.3 (12-12-18 @ 00:42)  WBC Count: 15.5 (12-11-18 @ 05:57)  WBC Count: 18.6 (12-10-18 @ 06:09)                        11.3   11.7  )-----------( 668      ( 16 Dec 2018 06:52 )             38.9 12-16    136  |  97  |  17  ----------------------------<  105  4.2   |  25  |  0.50  Ca    8.8      16 Dec 2018 06:52Phos  2.9     12-16Mg     2.4     12-16  TPro  8.3  /  Alb  3.3  /  TBili  0.4  /  DBili  0.1  /  AST  10  /  ALT  29  /  AlkPhos  146  12-16    MICROBIOLOGY:   Culture - Abscess with Gram Stain (12.14.18 @ 21:23)    Specimen Source: .Abscess gallbladder fossa collection abscess    Culture Results:   Few Candida albicans    Culture - Blood (12.12.18 @ 09:44)    Specimen Source: .Blood Blood    Culture Results:   No growth to date.    Culture - Blood (12.12.18 @ 05:53)    Specimen Source: .Blood Blood    Culture Results:   No growth to date.    RADIOLOGY & ADDITIONAL STUDIES:  CT Abdomen and Pelvis w/ Oral Cont and w/ IV Cont (12.13.18 @ 11:12) >  Subcapsular fluid collection, not significantly changed in size dating back to 12/5/2018. Air and fluid collection in the gallbladder fossa which has increased in size since 12/5/2018.    IR Procedure (12.10.18 @ 13:54) >  Impression: Exchange, upsizing and repositioning of abdominal drainage catheter using fluoroscopic guidance as above.

## 2018-12-16 NOTE — DISCHARGE NOTE ADULT - HOSPITAL COURSE
38M w/ PMH of CHRISTINE, chronic cholecystitis/cholelithiasis s/p laparoscopic cholecystostomy tube placement by Dr. Pritchett in August 2018 (Malencot drain placed in gallbladder and REMI drain left in liver/sidewall interface) and now s/p laparoscopic cholecystectomy 12/3/18 w/ Dr. Pritchett at . He tolerated procedure well and was sent home same day, however returned to [facility] later that night with fevers, chills, and worsening abdominal pain. He was tachycardic, tachypneic, had a low grade temperature (~100F) with abdominal tenderness. He was started on IVF and zosyn. CT A/P was suspicious for bile leak. He was transferred to [facility] for further management.  On admission to [facility], patient was complaining of abdominal pain worse n RUQ and right shoulder pain with deep breathing, fevers and chills, and constipation. On HD1, patient underwent US and fluoroscopically guided right upper quadrant chrystal-hepatic drain placement with IR. On HD2, patient underwent ERCP with sphincterotomy and stent placement. Patient tolerated the procedure well (see ERCP for full details).  He was continued on IV antibiotics and ID was consulted for management. He was changed to meropenem. He complained of pleuritic chest pain and underwent a CT chest/abd/pelvis.  On HD5, patient underwent abdominal tube check and exchange, upsizing and repositioning with IR. Repeat CTAP on HD8 showed air and fluid collection in the gallbladder fossa which has increased in size so IR was reconsulted and the patient underwent placement of 10 Fr drain in gallbladder fossa fluid collection. Cultures grew out candida from drain.   He was started on diflucan in addition to meropenem. Antibiotics were changed to po Cipro for additional 7 days and Diflucan for another 14 days.  He will follow up with his surgeon Dr. Pritchett, GI and IR as outpatient. 38M w/ PMH of CHRISTINE, chronic cholecystitis/cholelithiasis s/p laparoscopic cholecystostomy tube placement by Dr. Pritchett in August 2018 (Malencot drain placed in gallbladder and REMI drain left in liver/sidewall interface) and now s/p laparoscopic cholecystectomy 12/3/18 w/ Dr. Pritchett at [facility]. He tolerated procedure well and was sent home same day, however returned to [facility] later that night with fevers, chills, and worsening abdominal pain. He was tachycardic, tachypneic, had a low grade temperature (~100F) with abdominal tenderness. He was started on IVF and zosyn. CT A/P was suspicious for bile leak. He was transferred to [facility] for further management.    On admission to [facility], patient was complaining of abdominal pain worse in RUQ and right shoulder pain with deep breathing, fevers and chills, and constipation. In setting of tachycardia and fever, patient was diagnosed with sepsis secondary to bile leak and biloma from his previous procedure (12/3/18, Dr. Pritchett). He also had multiple electrolyte derangements, likely secondary to his sepsis and NPO status, which were treated with IV fluids and repletions as indicated. On HD1, patient underwent US and fluoroscopically guided right upper quadrant chrystal-hepatic drain placement with IR. On HD2, patient underwent ERCP with sphincterotomy and stent placement. Patient tolerated the procedure well (see ERCP note for full details).  He was continued on IV antibiotics and ID was consulted for management. He was changed to meropenem. He complained of pleuritic chest pain and underwent a CT chest/abd/pelvis.  On HD5, patient underwent abdominal tube check and exchange, upsizing and repositioning with IR. Repeat CTAP on HD8 showed air and fluid collection in the gallbladder fossa which has increased in size so IR was reconsulted and the patient underwent placement of 10 Fr drain in gallbladder fossa fluid collection. Cultures grew out candida from drain.   He was started on diflucan in addition to meropenem. Antibiotics were changed to po Cipro for additional 7 days and Diflucan for another 14 days.  He will follow up with his surgeon Dr. Pritchett, GI and IR as outpatient. 38M w/ PMH of CHRISTINE, chronic cholecystitis/cholelithiasis s/p laparoscopic cholecystostomy tube placement by Dr. Pritchett in August 2018 (Malencot drain placed in gallbladder and REMI drain left in liver/sidewall interface) and now s/p laparoscopic cholecystectomy 12/3/18 w/ Dr. Pritchett at [facility]. He tolerated procedure well and was sent home same day, however returned to [facility] ED later that night with fevers, chills, and worsening abdominal pain. He was tachycardic, tachypneic, had a low grade temperature (~100F) with abdominal tenderness. He was started on IVF and zosyn. CT A/P was suspicious for bile leak. He was transferred to [facility] for further management.    On admission to [facility], patient was complaining of abdominal pain worse in RUQ and right shoulder pain with deep breathing, fevers and chills, and constipation. In setting of tachycardia and fever, patient was diagnosed with septic shock secondary to bile leak and biloma from his previous procedure (12/3/18, Dr. Pritchett). He also had multiple electrolyte derangements including hypophosphatemia and hyponatremia, likely secondary to his sepsis and NPO status, which were treated with IV fluids and repletions as indicated. On HD1, patient underwent US and fluoroscopically guided right upper quadrant chrystal-hepatic drain placement with IR. On HD2, patient underwent ERCP with sphincterotomy and stent placement. Patient tolerated the procedure well (see ERCP note for full details).  He was continued on IV antibiotics and ID was consulted for management. He was changed to meropenem. He complained of pleuritic chest pain and underwent a CT chest/abd/pelvis.  On HD5, patient underwent abdominal tube check and exchange, upsizing and repositioning with IR. Repeat CTAP on HD8 showed air and fluid collection in the gallbladder fossa which has increased in size so IR was reconsulted and the patient underwent placement of 10 Fr drain in gallbladder fossa fluid collection. Cultures grew out candida from drain.   He was started on diflucan in addition to meropenem. Antibiotics were changed to po Cipro for additional 7 days and Diflucan for another 14 days.  He will follow up with his surgeon Dr. Pritchett, GI and IR as outpatient.

## 2018-12-16 NOTE — DISCHARGE NOTE ADULT - NS AS ACTIVITY OBS
not while taking narcotic pain medication/Do not drive or operate machinery/No Heavy lifting/straining/Do not make important decisions

## 2018-12-16 NOTE — PROGRESS NOTE ADULT - ASSESSMENT
38M s/p lap patrick at OSH, PPD8 from US and fluoroscopy guided IR placement of right upper quadrant perihepatic drain, PPD6 from ERCP for bile leak with sphincterotomy and stent placement, PPD6 s/p Abdominal tube check and exchange, upsizing and repositioning with IR, and now PPD2 s/p new right anterior abdominal drain in GB fossa placement and repositioning of previous drain    - Cont low fat diet  - Pain control prn  - Chest PT  - Cont meropenem per ID recs, f/u any new recs  - Follow up blood cx and abdominal fluid cultures   - dvt ppx, oob/ambulate as tolerated  - Will likely need long term IV antibiotics - PICC planning   - Engage social work for dc planning

## 2018-12-16 NOTE — DISCHARGE NOTE ADULT - CARE PROVIDER_API CALL
Stanley Chen (MD), Surgery  [address] [facility] [address]  [address]  [place] [address]  Phone: [phone]  Fax: [phone] Stanley Chen (MD), Surgery  [address] [facility]  [address]  [place] [address]  Phone: [phone]  Fax: [phone]    Santino Pritchett), Surgery  [address] [place] [address]  Phone: [phone]  Fax: [phone]    Ji Atkins), Interventional Radiology and Diagnostic Radiology  [address] [place] [address]  Phone: [phone]  Fax: [phone]    Shant Noyola (DO), Gastroenterology; Internal Medicine  [address] [place] [address]  Phone: [phone]  Fax: [phone] Santino Pritchett), Surgery  [address] [place] [address]  Phone: [phone]  Fax: [phone]    Ji Atkins), Interventional Radiology and Diagnostic Radiology  [address] [place] [address]  Phone: [phone]  Fax: [phone]    Shant Noyola (DO), Gastroenterology; Internal Medicine  [address] [place] [address]  Phone: [phone]  Fax: [phone]    Garth Johnson), Infectious Disease; Internal Medicine  [address] [place] [address]  Phone: [phone]  Fax: [phone]

## 2018-12-16 NOTE — DISCHARGE NOTE ADULT - PATIENT PORTAL LINK FT
You can access the [facility] Patient Portal, offered by [facility], by registering with the following website: [email or web address]

## 2018-12-16 NOTE — DISCHARGE NOTE ADULT - CONDITION (STATED IN TERMS THAT PERMIT A SPECIFIC MEASURABLE COMPARISON WITH CONDITION ON ADMISSION):
hemodynamically stable cleared for discharge by Dr. Chen hemodynamically stable cleared for discharge by Dr. VIVIANE Chen

## 2018-12-17 VITALS
HEART RATE: 87 BPM | SYSTOLIC BLOOD PRESSURE: 108 MMHG | RESPIRATION RATE: 18 BRPM | OXYGEN SATURATION: 99 % | DIASTOLIC BLOOD PRESSURE: 71 MMHG | TEMPERATURE: 99 F

## 2018-12-17 LAB
ALBUMIN SERPL ELPH-MCNC: 3.3 G/DL — SIGNIFICANT CHANGE UP (ref 3.3–5)
ALP SERPL-CCNC: 138 U/L — HIGH (ref 40–120)
ALT FLD-CCNC: 33 U/L — SIGNIFICANT CHANGE UP (ref 10–45)
ANION GAP SERPL CALC-SCNC: 12 MMOL/L — SIGNIFICANT CHANGE UP (ref 5–17)
AST SERPL-CCNC: 11 U/L — SIGNIFICANT CHANGE UP (ref 10–40)
BILIRUB DIRECT SERPL-MCNC: 0.1 MG/DL — SIGNIFICANT CHANGE UP (ref 0–0.2)
BILIRUB INDIRECT FLD-MCNC: 0.4 MG/DL — SIGNIFICANT CHANGE UP (ref 0.2–1)
BILIRUB SERPL-MCNC: 0.5 MG/DL — SIGNIFICANT CHANGE UP (ref 0.2–1.2)
BUN SERPL-MCNC: 18 MG/DL — SIGNIFICANT CHANGE UP (ref 7–23)
CALCIUM SERPL-MCNC: 9.2 MG/DL — SIGNIFICANT CHANGE UP (ref 8.4–10.5)
CHLORIDE SERPL-SCNC: 97 MMOL/L — SIGNIFICANT CHANGE UP (ref 96–108)
CO2 SERPL-SCNC: 26 MMOL/L — SIGNIFICANT CHANGE UP (ref 22–31)
CREAT SERPL-MCNC: 0.56 MG/DL — SIGNIFICANT CHANGE UP (ref 0.5–1.3)
CULTURE RESULTS: SIGNIFICANT CHANGE UP
CULTURE RESULTS: SIGNIFICANT CHANGE UP
GLUCOSE SERPL-MCNC: 91 MG/DL — SIGNIFICANT CHANGE UP (ref 70–99)
HCT VFR BLD CALC: 37.5 % — LOW (ref 39–50)
HGB BLD-MCNC: 12 G/DL — LOW (ref 13–17)
MAGNESIUM SERPL-MCNC: 2.4 MG/DL — SIGNIFICANT CHANGE UP (ref 1.6–2.6)
MCHC RBC-ENTMCNC: 26.6 PG — LOW (ref 27–34)
MCHC RBC-ENTMCNC: 32 GM/DL — SIGNIFICANT CHANGE UP (ref 32–36)
MCV RBC AUTO: 83.2 FL — SIGNIFICANT CHANGE UP (ref 80–100)
PHOSPHATE SERPL-MCNC: 3.4 MG/DL — SIGNIFICANT CHANGE UP (ref 2.5–4.5)
PLATELET # BLD AUTO: 671 K/UL — HIGH (ref 150–400)
POTASSIUM SERPL-MCNC: 4.2 MMOL/L — SIGNIFICANT CHANGE UP (ref 3.5–5.3)
POTASSIUM SERPL-SCNC: 4.2 MMOL/L — SIGNIFICANT CHANGE UP (ref 3.5–5.3)
PROT SERPL-MCNC: 8.6 G/DL — HIGH (ref 6–8.3)
RBC # BLD: 4.51 M/UL — SIGNIFICANT CHANGE UP (ref 4.2–5.8)
RBC # FLD: 13.2 % — SIGNIFICANT CHANGE UP (ref 10.3–14.5)
SODIUM SERPL-SCNC: 135 MMOL/L — SIGNIFICANT CHANGE UP (ref 135–145)
SPECIMEN SOURCE: SIGNIFICANT CHANGE UP
SPECIMEN SOURCE: SIGNIFICANT CHANGE UP
WBC # BLD: 11.3 K/UL — HIGH (ref 3.8–10.5)
WBC # FLD AUTO: 11.3 K/UL — HIGH (ref 3.8–10.5)

## 2018-12-17 PROCEDURE — 99231 SBSQ HOSP IP/OBS SF/LOW 25: CPT

## 2018-12-17 PROCEDURE — 99232 SBSQ HOSP IP/OBS MODERATE 35: CPT

## 2018-12-17 RX ORDER — OXYCODONE HYDROCHLORIDE 5 MG/1
1 TABLET ORAL
Qty: 20 | Refills: 0 | OUTPATIENT
Start: 2018-12-17

## 2018-12-17 RX ORDER — FLUCONAZOLE 150 MG/1
200 TABLET ORAL DAILY
Qty: 0 | Refills: 0 | Status: DISCONTINUED | OUTPATIENT
Start: 2018-12-17 | End: 2018-12-17

## 2018-12-17 RX ORDER — DOCUSATE SODIUM 100 MG
1 CAPSULE ORAL
Qty: 0 | Refills: 0 | COMMUNITY
Start: 2018-12-17

## 2018-12-17 RX ORDER — CIPROFLOXACIN LACTATE 400MG/40ML
500 VIAL (ML) INTRAVENOUS EVERY 12 HOURS
Qty: 0 | Refills: 0 | Status: DISCONTINUED | OUTPATIENT
Start: 2018-12-17 | End: 2018-12-17

## 2018-12-17 RX ORDER — CIPROFLOXACIN LACTATE 400MG/40ML
1 VIAL (ML) INTRAVENOUS
Qty: 14 | Refills: 0 | OUTPATIENT
Start: 2018-12-17

## 2018-12-17 RX ORDER — FLUCONAZOLE 150 MG/1
1 TABLET ORAL
Qty: 14 | Refills: 0 | OUTPATIENT
Start: 2018-12-17

## 2018-12-17 RX ORDER — SENNA PLUS 8.6 MG/1
1 TABLET ORAL
Qty: 0 | Refills: 0 | COMMUNITY
Start: 2018-12-17

## 2018-12-17 RX ADMIN — Medication 975 MILLIGRAM(S): at 08:53

## 2018-12-17 RX ADMIN — MEROPENEM 100 MILLIGRAM(S): 1 INJECTION INTRAVENOUS at 05:56

## 2018-12-17 RX ADMIN — FLUCONAZOLE 200 MILLIGRAM(S): 150 TABLET ORAL at 15:02

## 2018-12-17 RX ADMIN — HEPARIN SODIUM 5000 UNIT(S): 5000 INJECTION INTRAVENOUS; SUBCUTANEOUS at 05:57

## 2018-12-17 RX ADMIN — HEPARIN SODIUM 5000 UNIT(S): 5000 INJECTION INTRAVENOUS; SUBCUTANEOUS at 14:04

## 2018-12-17 RX ADMIN — Medication 975 MILLIGRAM(S): at 02:07

## 2018-12-17 RX ADMIN — Medication 100 MILLIGRAM(S): at 14:04

## 2018-12-17 RX ADMIN — Medication 975 MILLIGRAM(S): at 15:00

## 2018-12-17 RX ADMIN — Medication 975 MILLIGRAM(S): at 14:04

## 2018-12-17 RX ADMIN — Medication 975 MILLIGRAM(S): at 09:40

## 2018-12-17 RX ADMIN — Medication 975 MILLIGRAM(S): at 02:38

## 2018-12-17 RX ADMIN — FLUCONAZOLE 100 MILLIGRAM(S): 150 TABLET ORAL at 06:37

## 2018-12-17 RX ADMIN — Medication 100 MILLIGRAM(S): at 05:57

## 2018-12-17 NOTE — PROGRESS NOTE ADULT - SUBJECTIVE AND OBJECTIVE BOX
Interventional Radiology    S/P GB fossa drainage, POD # 3 and perihepatic fluid collection drainage on 12/5/18 with upsizing of perihepatic fluid collection drainage on 12/10.     Vital Signs Last 24 Hrs  T(C): 37.6 (17 Dec 2018 09:07), Max: 37.6 (17 Dec 2018 01:29)  T(F): 99.6 (17 Dec 2018 09:07), Max: 99.6 (17 Dec 2018 01:29)  HR: 89 (17 Dec 2018 09:07) (82 - 89)  BP: 112/72 (17 Dec 2018 09:07) (105/69 - 112/72)  BP(mean): --  RR: 18 (17 Dec 2018 09:07) (18 - 18)  SpO2: 97% (17 Dec 2018 09:07) (95% - 98%)    General Appearance:     Procedure Site (Right upper quadrant abdomen):     LABS:                      12.0   11.3  )-----------( 671      ( 17 Dec 2018 07:27 )             37.5     Culture - Abscess with Gram Stain (12.14.18 @ 21:23)    Specimen Source: .Abscess gallbladder fossa collection abscess    Culture Results:   Few Candida albicans    OUTPUTS:   REMI# 1:  12/17/18 7AM 20 ml, 12/16/18 7AM 45 ml   REMI# 2:  12/17/18 7AM 15 ml, 12/16/18 7AM 51 ml       38y Male with PMH cholecystectomy 12/3 with postoperative bile leak s/p drainage of biloma on 12/5, s/p endoscopy on 12/6 bile leak found s/p sphincterotomy and covered metal stent placement in CBD s/p exchange and upsizing of the drain ( 12french) on 12/10 now s/p Gallbladder fossa collection drainage on 12/14/18     Continue care as per primary team  Forward flush both IR drainage catheters with 5 ml sterile NS once daily (do not aspirate), monitor and record outputs from both drainage catheters, maintain dressing around the sites of drainage catheters.  contact IR as needed at ext [phone]     RACHAEL Oro  [facility] [address]  Ext [phone] Interventional Radiology    S/P GB fossa drainage, POD # 3 and perihepatic fluid collection drainage on 12/5/18 with upsizing of perihepatic fluid collection drainage on 12/10.     Vital Signs Last 24 Hrs  T(C): 37.6 (17 Dec 2018 09:07), Max: 37.6 (17 Dec 2018 01:29)  T(F): 99.6 (17 Dec 2018 09:07), Max: 99.6 (17 Dec 2018 01:29)  HR: 89 (17 Dec 2018 09:07) (82 - 89)  BP: 112/72 (17 Dec 2018 09:07) (105/69 - 112/72)  BP(mean): --  RR: 18 (17 Dec 2018 09:07) (18 - 18)  SpO2: 97% (17 Dec 2018 09:07) (95% - 98%)    General Appearance: NAD     Procedure Site (Right upper quadrant abdomen): perihepatic biloma drainage intact draining yellow fluid, GB fossa drainage draining turbid red fluid, both intact and flushed without difficulty    LABS:                      12.0   11.3  )-----------( 671      ( 17 Dec 2018 07:27 )             37.5     Culture - Abscess with Gram Stain (12.14.18 @ 21:23)    Specimen Source: .Abscess gallbladder fossa collection abscess    Culture Results:   Few Candida albicans    OUTPUTS:   Perihepatic (biloma) drainage REMI# 1:  12/17/18 7AM 20 ml, 12/16/18 7AM 45 ml   GB fossa collection REMI# 2:  12/17/18 7AM 15 ml, 12/16/18 7AM 51 ml       38y Male with PMH cholecystectomy 12/3 with postoperative bile leak s/p drainage of biloma on 12/5, s/p endoscopy on 12/6 bile leak found s/p sphincterotomy and covered metal stent placement in CBD s/p exchange and upsizing of the drain (12 french) on 12/10 now s/p Gallbladder fossa collection drainage on 12/14/18     Continue care as per primary team    Forward flush both IR drainage catheters with 5 ml sterile NS once daily (do not aspirate), monitor and record outputs from both drainage catheters, maintain dressing around the sites of drainage catheters.  Pt can follow up with IR as an outpatient by calling IR scheduling office at [phone] and making appointment.  Pt will need VNS service for drainage catheter maintenance with dressing and flushes and should continue to measure and record outputs from drainages at home.  IR contact information was given to the pt.     Contact IR at ext [phone] as needed.     RACHAEL Oro  [facility] [address]  Ext [phone]

## 2018-12-17 NOTE — PROGRESS NOTE ADULT - SUBJECTIVE AND OBJECTIVE BOX
Red Team Surgery Progress Note    SUBJECTIVE: Patient seen and examined at the bedside. No acute events overnight. Tolerating regular diet. No nausea or vomiting. Pain well controlled. Passed flatus and bowel movements. Ambulating well.     OBJECTIVE:    PHYSICAL EXAM:   General: NAD, Lying in bed comfortably, alert, oriented x3  Pulm: Non-labored breathing on RA  GI/Abd: Soft, NT, distended, no rebound/guarding, right REMI drain in place and collecting bile-tinged serous fluid, anterior right REMI drain in place collecting serosanguinous fluid, old lap sites and old drain site healing well   Blood cultures consistently negative  Body fluid culture from 12/6 showed rare serratia and rare pseudomonas  Abscess drainage from 12/14 showed rare Candida      Vital Signs Last 24 Hrs  T(C): 36.9 (17 Dec 2018 05:58), Max: 37.6 (17 Dec 2018 01:29)  T(F): 98.4 (17 Dec 2018 05:58), Max: 99.6 (17 Dec 2018 01:29)  HR: 82 (17 Dec 2018 05:58) (75 - 89)  BP: 105/69 (17 Dec 2018 05:58) (105/69 - 112/72)  BP(mean): --  RR: 18 (17 Dec 2018 05:58) (18 - 18)  SpO2: 98% (17 Dec 2018 05:58) (95% - 98%)    12-16-18 @ 07:01  -  12-17-18 @ 07:00  --------------------------------------------------------  IN: 1720 mL / OUT: 1610 mL / NET: 110 mL      MEDICATIONS  (STANDING):  acetaminophen   Tablet .. 975 milliGRAM(s) Oral every 6 hours  docusate sodium 100 milliGRAM(s) Oral three times a day  fluconAZOLE IVPB 200 milliGRAM(s) IV Intermittent every 24 hours  heparin  Injectable 5000 Unit(s) SubCutaneous every 8 hours  meropenem  IVPB      meropenem  IVPB 1000 milliGRAM(s) IV Intermittent every 8 hours  senna 1 Tablet(s) Oral daily    MEDICATIONS  (PRN):  oxyCODONE    IR 5 milliGRAM(s) Oral every 6 hours PRN Moderate Pain (4 - 6)    CBC (12-16 @ 06:52)                              11.3<L>                         11.7<H>  )----------------(  668<H>     --    % Neutrophils, --    % Lymphocytes, ANC: --                                  38.9<L>                BMP (12-16 @ 06:52)             136     |  97      |  17    		Ca++ --      Ca 8.8                ---------------------------------( 105<H>		Mg 2.4                4.2     |  25      |  0.50  			Ph 2.9       LFTs (12-16 @ 06:52)      TPro 8.3 / Alb 3.3 / TBili 0.4 / DBili 0.1 / AST 10 / ALT 29 / AlkPhos 146<H>          -> .Abscess gallbladder fossa collection abscess Culture (12-14 @ 21:23)     NG    NG    Few Candida albicans    -> .Blood Blood Culture (12-12 @ 09:44)     NG    NG    No growth to date.    -> .Blood Blood Culture (12-12 @ 05:53)     NG    NG    No growth at 5 days.

## 2018-12-17 NOTE — PROGRESS NOTE ADULT - SUBJECTIVE AND OBJECTIVE BOX
INFECTIOUS DISEASES FOLLOW UP--Garth Johnson MD  Pager [phone]    This is a follow up note for this  38y Male with  abd collection post cholecystectomy.  Bile leak.  Recent culture with candida albicans.  started on diflucan.  WBC count normalizing and no fevers.    Further ROS:  CONSTITUTIONAL:  No fever, good appetite  CARDIOVASCULAR:  No chest pain or palpitations  RESPIRATORY:  No dyspnea  GASTROINTESTINAL:  No nausea, vomiting, diarrhea, or abdominal pain  GENITOURINARY:  No dysuria  NEUROLOGIC:  No headache,     Allergies  adhesives (Rash)  cortisone (Rash)    ANTIBIOTICS/RELEVANT:  antimicrobials  fluconAZOLE IVPB 200 milliGRAM(s) IV Intermittent every 24 hours  meropenem  IVPB      meropenem  IVPB 1000 milliGRAM(s) IV Intermittent every 8 hours    OTHER:  acetaminophen   Tablet .. 975 milliGRAM(s) Oral every 6 hours  docusate sodium 100 milliGRAM(s) Oral three times a day  heparin  Injectable 5000 Unit(s) SubCutaneous every 8 hours  oxyCODONE    IR 5 milliGRAM(s) Oral every 6 hours PRN  senna 1 Tablet(s) Oral daily    Objective:  Vital Signs Last 24 Hrs  T(C): 36.9 (17 Dec 2018 05:58), Max: 37.6 (17 Dec 2018 01:29)  T(F): 98.4 (17 Dec 2018 05:58), Max: 99.6 (17 Dec 2018 01:29)  HR: 82 (17 Dec 2018 05:58) (75 - 89)  BP: 105/69 (17 Dec 2018 05:58) (105/69 - 112/72)  BP(mean): --  RR: 18 (17 Dec 2018 05:58) (18 - 18)  SpO2: 98% (17 Dec 2018 05:58) (95% - 98%)    PHYSICAL EXAM:  Constitutional:no acute distress  Eyes:ROSA, EOMI  Ear/Nose/Throat: no oral lesions, 	  Respiratory: clear BL  Cardiovascular: S1S2  Gastrointestinal:soft, (+) BS, no tenderness  JPs X2  Extremities:no e/e/c  No Lymphadenopathy  IV sites not inflammed.    LABS:                        12.0   11.3  )-----------( 671      ( 17 Dec 2018 07:27 )             37.5     12-17    135  |  97  |  18  ----------------------------<  91  4.2   |  26  |  0.56    Ca    9.2      17 Dec 2018 07:27  Phos  3.4     12-17  Mg     2.4     12-17    TPro  8.6<H>  /  Alb  3.3  /  TBili  0.5  /  DBili  0.1  /  AST  11  /  ALT  33  /  AlkPhos  138<H>  12-17    MICROBIOLOGY:  aspirated fluid with c. albicans    Imp/Rx:  Seems to be improving.  The R chest pain resolved.  No fevers.  WBC count normalizing.    Suggest---  ok to change to diflucan 200 mg PO daily for 14 days.  can dc the meropenem and start cipro 500 mg po bid for 7 days.  will need f/u imaging at some point  - probably as outpatient.    dc planning.

## 2018-12-17 NOTE — PROGRESS NOTE ADULT - PROBLEM SELECTOR PROBLEM 1
Bile leak, postoperative

## 2018-12-17 NOTE — PROGRESS NOTE ADULT - SUBJECTIVE AND OBJECTIVE BOX
Interval Events: Pt seen and examined. No acute overnight events. Pt is tolerating PO well, no n/v  less abdominal pain  afebrile     MEDICATIONS  (STANDING):  acetaminophen   Tablet .. 975 milliGRAM(s) Oral every 6 hours  ciprofloxacin     Tablet 500 milliGRAM(s) Oral every 12 hours  docusate sodium 100 milliGRAM(s) Oral three times a day  fluconAZOLE   Tablet 200 milliGRAM(s) Oral daily  heparin  Injectable 5000 Unit(s) SubCutaneous every 8 hours  senna 1 Tablet(s) Oral daily    MEDICATIONS  (PRN):  oxyCODONE    IR 5 milliGRAM(s) Oral every 6 hours PRN Moderate Pain (4 - 6)      Allergies    adhesives (Rash)  cortisone (Rash)    Intolerances        Review of Systems:    General:  No wt loss, fevers, chills, night sweats,fatigue,   Eyes:  Good vision, no reported pain  ENT:  No sore throat, pain, runny nose, dysphagia  CV:  No pain, palpitations, hypo/hypertension  Resp:  No dyspnea, cough, tachypnea, wheezing  GI:  No pain, No nausea, No vomiting, No diarrhea, No constipation, No weight loss, No fever, No pruritis, No rectal bleeding, No melena, No dysphagia  :  No pain, bleeding, incontinence, nocturia  Muscle:  No pain, weakness  Neuro:  No weakness, tingling, memory problems  Psych:  No fatigue, insomnia, mood problems, depression  Endocrine:  No polyuria, polydypsia, cold/heat intolerance  Heme:  No petechiae, ecchymosis, easy bruisability  Skin:  No rash, tattoos, scars, edema      Vital Signs Last 24 Hrs  T(C): 37.6 (17 Dec 2018 09:07), Max: 37.6 (17 Dec 2018 01:29)  T(F): 99.6 (17 Dec 2018 09:07), Max: 99.6 (17 Dec 2018 01:29)  HR: 89 (17 Dec 2018 09:07) (82 - 89)  BP: 112/72 (17 Dec 2018 09:07) (105/69 - 112/72)  BP(mean): --  RR: 18 (17 Dec 2018 09:07) (18 - 18)  SpO2: 97% (17 Dec 2018 09:07) (95% - 98%)    PHYSICAL EXAM:    Constitutional: NAD, well-developed  HEENT: EOMI, throat clear  Neck: No LAD, supple  Respiratory: CTA and P  Cardiovascular: S1 and S2, RRR,  Gastrointestinal: soft, NT, mildly distended, IR drain in place in RLQ and collecting bilious fluid, old lap sites covered with steri strips, old drain site in RLQ healing well  Extremities: No peripheral edema, neg clubing, cyanosis  Vascular: 2+ peripheral pulses  Neurological: A/O x 3, no focal deficits  Psychiatric: Normal mood, normal affect  Skin: No rashes      LABS:                        12.0   11.3  )-----------( 671      ( 17 Dec 2018 07:27 )             37.5     12-17    135  |  97  |  18  ----------------------------<  91  4.2   |  26  |  0.56    Ca    9.2      17 Dec 2018 07:27  Phos  3.4     12-17  Mg     2.4     12-17    TPro  8.6<H>  /  Alb  3.3  /  TBili  0.5  /  DBili  0.1  /  AST  11  /  ALT  33  /  AlkPhos  138<H>  12-17          RADIOLOGY & ADDITIONAL TESTS:

## 2018-12-17 NOTE — PROGRESS NOTE ADULT - PROVIDER SPECIALTY LIST ADULT
Gastroenterology
Infectious Disease
Intervent Radiology
Surgery
Gastroenterology

## 2018-12-17 NOTE — PROGRESS NOTE ADULT - PROBLEM SELECTOR PLAN 1
- s/p  Abdominal tube check and exchange, upsizing and repositioning with IR 12/10  - s/p ERCP 12/6 A bile leak was found  - A sphincterotomy was performed  - One covered metal stent was placed into the common bile duct  - abx as per ID, appreciated their recs   - diet advanced to regular, pt has been tolerating diet  - Trend liver enzymes (AST, ALT, alkaline phosphatase, bilirubin)
- s/p Abdominal tube check and exchange, upsizing and repositioning with IR 12/10  - s/p ERCP 12/6 A bile leak was found  - A sphincterotomy was performed  - One covered metal stent was placed into the common bile duct  - abx as per ID, appreciated their recs   - diet advanced to regular, pt has been tolerating diet  - Trend liver enzymes (AST, ALT, alkaline phosphatase, bilirubin)  - repeat CT a/p negative  - NPO for IR procedure today - repositioning of current drain and possible GB fossa drain
- s/p Abdominal tube check and exchange, upsizing and repositioning with IR 12/10  - s/p ERCP 12/6 A bile leak was found  - A sphincterotomy was performed  - One covered metal stent was placed into the common bile duct  - abx as per ID, appreciated their recs   - diet advanced to regular, pt has been tolerating diet  - Trend liver enzymes (AST, ALT, alkaline phosphatase, bilirubin)  - repeat CT a/p negative  - NPO for IR procedure today - repositioning of current drain and possible GB fossa drain
- s/p  Abdominal tube check and exchange, upsizing and repositioning with IR 12/10  - s/p ERCP 12/6 A bile leak was found  - A sphincterotomy was performed  - One covered metal stent was placed into the common bile duct  - abx as ordered  - diet advanced to regular, pt has been tolerating diet  - Trend liver enzymes (AST, ALT, alkaline phosphatase, bilirubin)
- s/p  Abdominal tube check and exchange, upsizing and repositioning with IR 12/10  - s/p ERCP 12/6 A bile leak was found  - A sphincterotomy was performed  - One covered metal stent was placed into the common bile duct  - abx as per ID, appreciated their recs   - diet advanced to regular, pt has been tolerating diet  - Trend liver enzymes (AST, ALT, alkaline phosphatase, bilirubin)  - pt with low grade temps-- f/u repeat ct a/p and CT chest, pulmonary eval called for pleuritic chest pain
- s/p Abdominal tube check and exchange, upsizing and repositioning with IR 12/10  - s/p ERCP 12/6 A bile leak was found  - A sphincterotomy was performed  - One covered metal stent was placed into the common bile duct  - abx as per ID, appreciated their recs   - Recent culture with candida albicans, started on diflucan.  - diet advanced to regular, pt has been tolerating diet  - Trend liver enzymes (AST, ALT, alkaline phosphatase, bilirubin)  - repeat CT a/p negative  - s/p Abdominal tube check and exchange, upsizing and repositioning with IR, and now PPD3 s/p new right anterior abdominal drain in GB fossa placement and repositioning of previous drain
- s/p Abdominal tube check and exchange, upsizing and repositioning with IR 12/10  - s/p ERCP 12/6 A bile leak was found  - A sphincterotomy was performed  - One covered metal stent was placed into the common bile duct  - abx as per ID, appreciated their recs   - diet advanced to regular, pt has been tolerating diet  - Trend liver enzymes (AST, ALT, alkaline phosphatase, bilirubin)  - repeat CT a/p negative  - NPO for IR procedure today - repositioning of current drain and possible GB fossa drain
- s/p ERCP A bile leak was found  - A sphincterotomy was performed  - One covered metal stent was placed into the common bile duct  - abx as ordered  - diet advanced to regular, pt has been tolerating diet  - plan for IR tube check today  - Trend liver enzymes (AST, ALT, alkaline phosphatase, bilirubin)
- s/p ERCP A bile leak was found.  - A sphincterotomy was performed.  - One covered metal stent was placed into the common bile duct.  - abx as ordered  - diet advanced to clears, advance as tolerated   - NPO.  - Trend liver enzymes (AST, ALT, alkaline phosphatase, bilirubin)
- s/p ERCP A bile leak was found.  - A sphincterotomy was performed.  - One covered metal stent was placed into the common bile duct.  - abx as ordered  - diet advanced to clears, advance as tolerated   - NPO.  - Trend liver enzymes (AST, ALT, alkaline phosphatase, bilirubin)  albuterol mdi given sob likely to abdominal distention
- s/p ERCP A bile leak was found.  - A sphincterotomy was performed.  - One covered metal stent was placed into the common bile duct.  - abx as ordered  - diet advanced to clears, advance as tolerated   - NPO.  - Trend liver enzymes (AST, ALT, alkaline phosphatase, bilirubin)  albuterol mdi given sob likely to abdominal distention

## 2018-12-17 NOTE — PROGRESS NOTE ADULT - REASON FOR ADMISSION
Transfer from OSH for abdominal pain

## 2018-12-17 NOTE — PROGRESS NOTE ADULT - ASSESSMENT
38M s/p lap patrick at OSH, PPD9 from US and fluoroscopy guided IR placement of right upper quadrant perihepatic drain, PPD7 from ERCP for bile leak with sphincterotomy and stent placement, PPD7 s/p Abdominal tube check and exchange, upsizing and repositioning with IR, and now PPD3 s/p new right anterior abdominal drain in GB fossa placement and repositioning of previous drain    - Cont low fat diet  - Pain control prn  - Chest PT  - Cont meropenem per ID recs, f/u any new recs,   - Will likely need long term IV antibiotics plan per ID- PICC vs. midline planning   - Follow up blood cx and abdominal fluid cultures   - dvt ppx, oob/ambulate as tolerated  - Engage social work for dc planning 38M s/p lap patrick at OSH, PPD9 from US and fluoroscopy guided IR placement of right upper quadrant perihepatic drain, PPD7 from ERCP for bile leak with sphincterotomy and stent placement, PPD7 s/p Abdominal tube check and exchange, upsizing and repositioning with IR, and now PPD3 s/p new right anterior abdominal drain in GB fossa placement and repositioning of previous drain    - Cont low fat diet  - Pain control prn  - Chest PT  - Cont meropenem per ID recs, f/u any new recs,   - Will likely need long term IV antibiotics plan per ID- PICC vs. midline planning   - Follow up blood cx and abdominal fluid cultures   - dvt ppx, oob/ambulate as tolerated  - outpt f/u with primary sx (josé miguel), GI, IR, ID   - Engage social work for dc planning

## 2018-12-19 LAB
CULTURE RESULTS: SIGNIFICANT CHANGE UP
SPECIMEN SOURCE: SIGNIFICANT CHANGE UP

## 2018-12-20 ENCOUNTER — EMERGENCY (EMERGENCY)
Facility: HOSPITAL | Age: 38
LOS: 1 days | Discharge: HOME | End: 2018-12-20
Attending: INTERNAL MEDICINE | Admitting: INTERNAL MEDICINE
Payer: MEDICAID

## 2018-12-20 VITALS
SYSTOLIC BLOOD PRESSURE: 138 MMHG | HEART RATE: 103 BPM | WEIGHT: 220.02 LBS | OXYGEN SATURATION: 100 % | TEMPERATURE: 98 F | RESPIRATION RATE: 18 BRPM | DIASTOLIC BLOOD PRESSURE: 81 MMHG | HEIGHT: 65 IN

## 2018-12-20 DIAGNOSIS — Z90.49 ACQUIRED ABSENCE OF OTHER SPECIFIED PARTS OF DIGESTIVE TRACT: Chronic | ICD-10-CM

## 2018-12-20 DIAGNOSIS — K80.10 CALCULUS OF GALLBLADDER WITH CHRONIC CHOLECYSTITIS WITHOUT OBSTRUCTION: ICD-10-CM

## 2018-12-20 DIAGNOSIS — R07.9 CHEST PAIN, UNSPECIFIED: ICD-10-CM

## 2018-12-20 DIAGNOSIS — K21.9 GASTRO-ESOPHAGEAL REFLUX DISEASE WITHOUT ESOPHAGITIS: ICD-10-CM

## 2018-12-20 DIAGNOSIS — Z98.890 OTHER SPECIFIED POSTPROCEDURAL STATES: Chronic | ICD-10-CM

## 2018-12-20 LAB
ALBUMIN SERPL ELPH-MCNC: 2.4 G/DL — LOW (ref 3.3–5)
ALP SERPL-CCNC: 146 U/L — HIGH (ref 40–120)
ALT FLD-CCNC: 60 U/L DA — HIGH (ref 10–45)
ANION GAP SERPL CALC-SCNC: 5 MMOL/L — SIGNIFICANT CHANGE UP (ref 5–17)
AST SERPL-CCNC: 26 U/L — SIGNIFICANT CHANGE UP (ref 10–40)
BILIRUB SERPL-MCNC: 0.3 MG/DL — SIGNIFICANT CHANGE UP (ref 0.2–1.2)
BUN SERPL-MCNC: 23 MG/DL — SIGNIFICANT CHANGE UP (ref 7–23)
CALCIUM SERPL-MCNC: 9.6 MG/DL — SIGNIFICANT CHANGE UP (ref 8.4–10.5)
CHLORIDE SERPL-SCNC: 103 MMOL/L — SIGNIFICANT CHANGE UP (ref 96–108)
CO2 SERPL-SCNC: 30 MMOL/L — SIGNIFICANT CHANGE UP (ref 22–31)
CREAT SERPL-MCNC: 1.05 MG/DL — SIGNIFICANT CHANGE UP (ref 0.5–1.3)
D DIMER BLD IA.RAPID-MCNC: 3818 NG/ML DDU — HIGH
GLUCOSE SERPL-MCNC: 111 MG/DL — HIGH (ref 70–99)
HCT VFR BLD CALC: 35.9 % — LOW (ref 39–50)
HGB BLD-MCNC: 11.4 G/DL — LOW (ref 13–17)
MCHC RBC-ENTMCNC: 26.6 PG — LOW (ref 27–34)
MCHC RBC-ENTMCNC: 31.7 GM/DL — LOW (ref 32–36)
MCV RBC AUTO: 83.9 FL — SIGNIFICANT CHANGE UP (ref 80–100)
PLATELET # BLD AUTO: 612 K/UL — HIGH (ref 150–400)
POTASSIUM SERPL-MCNC: 4.2 MMOL/L — SIGNIFICANT CHANGE UP (ref 3.5–5.3)
POTASSIUM SERPL-SCNC: 4.2 MMOL/L — SIGNIFICANT CHANGE UP (ref 3.5–5.3)
PROT SERPL-MCNC: 8.9 G/DL — HIGH (ref 6–8.3)
RBC # BLD: 4.28 M/UL — SIGNIFICANT CHANGE UP (ref 4.2–5.8)
RBC # FLD: 13.3 % — SIGNIFICANT CHANGE UP (ref 10.3–14.5)
SODIUM SERPL-SCNC: 138 MMOL/L — SIGNIFICANT CHANGE UP (ref 135–145)
TROPONIN I SERPL-MCNC: <.017 NG/ML — LOW (ref 0.02–0.06)
TROPONIN I SERPL-MCNC: <.017 NG/ML — LOW (ref 0.02–0.06)
WBC # BLD: 11.2 K/UL — HIGH (ref 3.8–10.5)
WBC # FLD AUTO: 11.2 K/UL — HIGH (ref 3.8–10.5)

## 2018-12-20 PROCEDURE — 93010 ELECTROCARDIOGRAM REPORT: CPT

## 2018-12-20 PROCEDURE — 99220: CPT

## 2018-12-20 PROCEDURE — 99285 EMERGENCY DEPT VISIT HI MDM: CPT

## 2018-12-20 PROCEDURE — 71046 X-RAY EXAM CHEST 2 VIEWS: CPT | Mod: 26

## 2018-12-20 PROCEDURE — 71275 CT ANGIOGRAPHY CHEST: CPT | Mod: 26

## 2018-12-20 RX ORDER — DOCUSATE SODIUM 100 MG
100 CAPSULE ORAL THREE TIMES A DAY
Qty: 0 | Refills: 0 | Status: DISCONTINUED | OUTPATIENT
Start: 2018-12-20 | End: 2018-12-24

## 2018-12-20 RX ORDER — SENNA PLUS 8.6 MG/1
1 TABLET ORAL DAILY
Qty: 0 | Refills: 0 | Status: DISCONTINUED | OUTPATIENT
Start: 2018-12-20 | End: 2018-12-24

## 2018-12-20 RX ORDER — ACETAMINOPHEN 500 MG
650 TABLET ORAL EVERY 6 HOURS
Qty: 0 | Refills: 0 | Status: DISCONTINUED | OUTPATIENT
Start: 2018-12-20 | End: 2018-12-24

## 2018-12-20 RX ORDER — ASPIRIN/CALCIUM CARB/MAGNESIUM 324 MG
324 TABLET ORAL ONCE
Qty: 0 | Refills: 0 | Status: COMPLETED | OUTPATIENT
Start: 2018-12-20 | End: 2018-12-20

## 2018-12-20 RX ORDER — CIPROFLOXACIN LACTATE 400MG/40ML
500 VIAL (ML) INTRAVENOUS EVERY 12 HOURS
Qty: 0 | Refills: 0 | Status: DISCONTINUED | OUTPATIENT
Start: 2018-12-20 | End: 2018-12-24

## 2018-12-20 RX ORDER — FLUCONAZOLE 150 MG/1
200 TABLET ORAL DAILY
Qty: 0 | Refills: 0 | Status: DISCONTINUED | OUTPATIENT
Start: 2018-12-20 | End: 2018-12-24

## 2018-12-20 RX ORDER — ASPIRIN/CALCIUM CARB/MAGNESIUM 324 MG
325 TABLET ORAL ONCE
Qty: 0 | Refills: 0 | Status: DISCONTINUED | OUTPATIENT
Start: 2018-12-20 | End: 2018-12-20

## 2018-12-20 RX ADMIN — Medication 324 MILLIGRAM(S): at 14:44

## 2018-12-20 NOTE — ED PROVIDER NOTE - ATTENDING CONTRIBUTION TO CARE
· Chief Complaint: The patient is a 38y Male complaining of chest pressure.  · HPI Objective Statement: 39 y/o M, nonsmoker, with PMH prediabetes, recent cholecystectomy with 2 REMI tubes present to the ED with c/o nonexertional, non radiating left sided chest pressure today associated with SOB and diaphoresis, lasted 10-15 mins then self resolved. He denies ankle swelling, calf pain, palpitations, f/c, abd pain, n/v, urinary symptoms or all other symptoms. Reports while getting medical clearance for surgery he was told he "had an old minor heart attack". Patient currently asymptomatic  chest pain work up ordrerd d dimer high ct angio neg for major PE  pt admitted for obs cardio  Dr. Cosme:  I have reviewed and discussed with the PA/ resident the case specifics, including the history, physical assessment, evaluation, conclusion, laboratory results, and medical plan. I agree with the contents, and conclusions. I have personally examined, and interviewed the patient.

## 2018-12-20 NOTE — H&P ADULT - NSHPLABSRESULTS_GEN_ALL_CORE
11.4                 138  | 30   | 23           11.2  >-----------< 612     ------------------------< 111                   35.9                 4.2  | 103  | 1.05                                         Ca 9.6   Mg x     Ph x        D-Dimer Assay, Quantitative: 3818    Troponin I, Serum: <.017    EXAM:  CT ANGIO CHEST (W)AW IC      PROCEDURE DATE:  12/20/2018        INTERPRETATION:  CTA CHEST WITH CONTRAST (CT PULMONARY EMBOLUS)      IMPRESSION:     1.  Limited study. The segmental and subsegmental branches of the   pulmonary arteries are not evaluated secondary to poor opacification. No   central or pulmonary emboli.    2.  No aortic aneurysm or dissection.    3.  Small right pleural effusion and subsegmental atelectasis of right   lower lobe, decreased.    Labs reviewed:     CXR personally reviewed:       EXAM:  XR CHEST PA LAT 2V      PROCEDURE DATE:  12/20/2018        INTERPRETATION:  PROCEDURE: PA and lateral views of the chest.    CLINICAL INFORMATION: Chest pain.    COMPARISON: 12/12/2018.    FINDINGS:        Lungs: The right hemidiaphragm is elevated. The lungs are clear.  Heart: The heart is normal in size.  Mediastinum: The mediastinum is within normal limits.    A right upper abdominal drainage catheter is noted.    IMPRESSION:    Clear lungs.      ECG reviewed and interpreted: sinus at 110

## 2018-12-20 NOTE — ED ADULT NURSE NOTE - CHPI ED NUR SYMPTOMS POS
SHORTNESS OF BREATH/Chest pressure/CHEST DISCOMFORT SHORTNESS OF BREATH/CHEST DISCOMFORT/Chest pressure, diaphoresis

## 2018-12-20 NOTE — H&P ADULT - NSHPPHYSICALEXAM_GEN_ALL_CORE
Vital Signs Last 24 Hrs  T(C): 36.4 (20 Dec 2018 14:09), Max: 36.4 (20 Dec 2018 14:09)  T(F): 97.6 (20 Dec 2018 14:09), Max: 97.6 (20 Dec 2018 14:09)  HR: 96 (20 Dec 2018 16:18) (96 - 103)  BP: 109/65 (20 Dec 2018 16:18) (109/65 - 138/81)  BP(mean): --  RR: 19 (20 Dec 2018 16:18) (18 - 19)  SpO2: 100% (20 Dec 2018 16:18) (100% - 100%)    nad,  mmm, john, ncat  supple  clear  s1s2  soft nt bs present, miranda drain present,   skin- incision site clean  no gross neuro deficits  aao x 3

## 2018-12-20 NOTE — ED ADULT NURSE NOTE - OBJECTIVE STATEMENT
Patient complaining of chest pain with associated SOB and sweating for 15 minutes PTA. Upon assessment, patient states chest pressure has resolved. Patient complaining of chest pain with associated SOB and sweating for 15 minutes PTA. Upon assessment, patient states chest pressure has resolved. Patient has PMH cholecystectomy on 12/3/18. Patient complaining of chest pain with associated SOB and diaphoresis for 15 minutes PTA. Upon assessment, patient states chest pressure has resolved. Patient has PMH cholecystectomy on 12/3/18.

## 2018-12-20 NOTE — ED PROVIDER NOTE - OBJECTIVE STATEMENT
39 y/o M with PMH recent cholecystectomy with 2 REMI tubes present to the ED with c/o nonexertional, non radiating left sided chest pressure today associated with SOB and diaphoresis, lasted 10-15 mins then self resolved. Reports 37 y/o M, nonsmoker, with PMH prediabetes, recent cholecystectomy with 2 REMI tubes present to the ED with c/o nonexertional, non radiating left sided chest pressure today associated with SOB and diaphoresis, lasted 10-15 mins then self resolved. He denies ankle swelling, calf pain, palpitations, f/c, abd pain, n/v, urinary symptoms or all other symptoms. Reports while getting medical clearance for surgery he was told he "had an old minor heart attack". Patient currently asymptomatic

## 2018-12-20 NOTE — ED PROVIDER NOTE - PROGRESS NOTE DETAILS
NIKKY Guzman: labs reviewed, d-dimer elevated. CTA pending NIKKY Guzman: CTA negative for PE. Patient agreeable to stay for obs for further cardiac w/u.   case presented to hospitalist Dr. Feng.

## 2018-12-20 NOTE — H&P ADULT - HISTORY OF PRESENT ILLNESS
hpi- 38 year old male with chronic cholecystitis/cholelithiasis s/p laparoscopic cholecystostomy tube placement in August 2018 and then s/p laparoscopic cholecystectomy 12/3/18 with post-operative bile leak, s/p ERCP with stent insertion into bile duct, c/o sudden onset of chest pain early today, while in the car with family/friend, aching left sided chest pain, 8/10, non radiating, lasted for 10-15 minutes, associated with sweating, and difficulty breathing, relieved after arriving to ED, no n/v, no palpitations no belly pain, no dysuria. is on antibiotics for chronic cholecystitis  for another 1-2 weeks. no prior chest pain episodes in the past.

## 2018-12-20 NOTE — ED PROVIDER NOTE - PHYSICAL EXAMINATION
AAOx3  Heart: s1/s2, RRR  Lung: CTA b/l  Abd: soft, NT/ND, no rebound or guarding, NCVAT  Extremity: no pedal edema or calf pain,  Neuro: patient moving all extremity equally, no focal neuro deficits noted

## 2018-12-21 ENCOUNTER — APPOINTMENT (OUTPATIENT)
Dept: SURGERY | Facility: CLINIC | Age: 38
End: 2018-12-21

## 2018-12-21 ENCOUNTER — OTHER (OUTPATIENT)
Age: 38
End: 2018-12-21

## 2018-12-21 ENCOUNTER — TRANSCRIPTION ENCOUNTER (OUTPATIENT)
Age: 38
End: 2018-12-21

## 2018-12-21 VITALS
SYSTOLIC BLOOD PRESSURE: 110 MMHG | DIASTOLIC BLOOD PRESSURE: 70 MMHG | RESPIRATION RATE: 14 BRPM | TEMPERATURE: 98 F | HEART RATE: 86 BPM | OXYGEN SATURATION: 98 %

## 2018-12-21 DIAGNOSIS — N17.9 ACUTE KIDNEY FAILURE, UNSPECIFIED: ICD-10-CM

## 2018-12-21 LAB
ALBUMIN SERPL ELPH-MCNC: 2.6 G/DL — LOW (ref 3.3–5)
ALP SERPL-CCNC: 162 U/L — HIGH (ref 40–120)
ALT FLD-CCNC: 70 U/L DA — HIGH (ref 10–45)
ANION GAP SERPL CALC-SCNC: 10 MMOL/L — SIGNIFICANT CHANGE UP (ref 5–17)
AST SERPL-CCNC: 29 U/L — SIGNIFICANT CHANGE UP (ref 10–40)
BILIRUB DIRECT SERPL-MCNC: 0.2 MG/DL — SIGNIFICANT CHANGE UP (ref 0–0.2)
BILIRUB INDIRECT FLD-MCNC: 0.1 MG/DL — LOW (ref 0.2–1)
BILIRUB SERPL-MCNC: 0.3 MG/DL — SIGNIFICANT CHANGE UP (ref 0.2–1.2)
BUN SERPL-MCNC: 24 MG/DL — HIGH (ref 7–23)
CALCIUM SERPL-MCNC: 9.8 MG/DL — SIGNIFICANT CHANGE UP (ref 8.4–10.5)
CHLORIDE SERPL-SCNC: 101 MMOL/L — SIGNIFICANT CHANGE UP (ref 96–108)
CO2 SERPL-SCNC: 27 MMOL/L — SIGNIFICANT CHANGE UP (ref 22–31)
CREAT SERPL-MCNC: 0.79 MG/DL — SIGNIFICANT CHANGE UP (ref 0.5–1.3)
GLUCOSE SERPL-MCNC: 124 MG/DL — HIGH (ref 70–99)
HCT VFR BLD CALC: 37.7 % — LOW (ref 39–50)
HGB BLD-MCNC: 12.1 G/DL — LOW (ref 13–17)
MCHC RBC-ENTMCNC: 26.6 PG — LOW (ref 27–34)
MCHC RBC-ENTMCNC: 32 GM/DL — SIGNIFICANT CHANGE UP (ref 32–36)
MCV RBC AUTO: 83.1 FL — SIGNIFICANT CHANGE UP (ref 80–100)
PLATELET # BLD AUTO: 645 K/UL — HIGH (ref 150–400)
POTASSIUM SERPL-MCNC: 3.9 MMOL/L — SIGNIFICANT CHANGE UP (ref 3.5–5.3)
POTASSIUM SERPL-SCNC: 3.9 MMOL/L — SIGNIFICANT CHANGE UP (ref 3.5–5.3)
PROT SERPL-MCNC: 9.3 G/DL — HIGH (ref 6–8.3)
RBC # BLD: 4.53 M/UL — SIGNIFICANT CHANGE UP (ref 4.2–5.8)
RBC # FLD: 13.2 % — SIGNIFICANT CHANGE UP (ref 10.3–14.5)
SODIUM SERPL-SCNC: 138 MMOL/L — SIGNIFICANT CHANGE UP (ref 135–145)
TROPONIN I SERPL-MCNC: <.017 NG/ML — LOW (ref 0.02–0.06)
WBC # BLD: 8.3 K/UL — SIGNIFICANT CHANGE UP (ref 3.8–10.5)
WBC # FLD AUTO: 8.3 K/UL — SIGNIFICANT CHANGE UP (ref 3.8–10.5)

## 2018-12-21 PROCEDURE — 80053 COMPREHEN METABOLIC PANEL: CPT

## 2018-12-21 PROCEDURE — 99284 EMERGENCY DEPT VISIT MOD MDM: CPT | Mod: 25

## 2018-12-21 PROCEDURE — 93016 CV STRESS TEST SUPVJ ONLY: CPT

## 2018-12-21 PROCEDURE — G0378: CPT

## 2018-12-21 PROCEDURE — 85027 COMPLETE CBC AUTOMATED: CPT

## 2018-12-21 PROCEDURE — 71046 X-RAY EXAM CHEST 2 VIEWS: CPT

## 2018-12-21 PROCEDURE — 93005 ELECTROCARDIOGRAM TRACING: CPT

## 2018-12-21 PROCEDURE — 93018 CV STRESS TEST I&R ONLY: CPT

## 2018-12-21 PROCEDURE — 36000 PLACE NEEDLE IN VEIN: CPT

## 2018-12-21 PROCEDURE — C8929: CPT

## 2018-12-21 PROCEDURE — 99235 HOSP IP/OBS SAME DATE MOD 70: CPT

## 2018-12-21 PROCEDURE — 93306 TTE W/DOPPLER COMPLETE: CPT | Mod: 26

## 2018-12-21 PROCEDURE — 78452 HT MUSCLE IMAGE SPECT MULT: CPT

## 2018-12-21 PROCEDURE — 71275 CT ANGIOGRAPHY CHEST: CPT

## 2018-12-21 PROCEDURE — 80076 HEPATIC FUNCTION PANEL: CPT

## 2018-12-21 PROCEDURE — 99214 OFFICE O/P EST MOD 30 MIN: CPT

## 2018-12-21 PROCEDURE — 93017 CV STRESS TEST TRACING ONLY: CPT

## 2018-12-21 PROCEDURE — 85379 FIBRIN DEGRADATION QUANT: CPT

## 2018-12-21 PROCEDURE — 80048 BASIC METABOLIC PNL TOTAL CA: CPT

## 2018-12-21 PROCEDURE — 84484 ASSAY OF TROPONIN QUANT: CPT

## 2018-12-21 RX ORDER — PANTOPRAZOLE SODIUM 20 MG/1
40 TABLET, DELAYED RELEASE ORAL DAILY
Qty: 0 | Refills: 0 | Status: DISCONTINUED | OUTPATIENT
Start: 2018-12-21 | End: 2018-12-24

## 2018-12-21 RX ORDER — SODIUM CHLORIDE 9 MG/ML
1000 INJECTION INTRAMUSCULAR; INTRAVENOUS; SUBCUTANEOUS
Qty: 0 | Refills: 0 | Status: DISCONTINUED | OUTPATIENT
Start: 2018-12-21 | End: 2018-12-24

## 2018-12-21 RX ADMIN — PANTOPRAZOLE SODIUM 40 MILLIGRAM(S): 20 TABLET, DELAYED RELEASE ORAL at 11:12

## 2018-12-21 RX ADMIN — Medication 500 MILLIGRAM(S): at 00:11

## 2018-12-21 RX ADMIN — SODIUM CHLORIDE 75 MILLILITER(S): 9 INJECTION INTRAMUSCULAR; INTRAVENOUS; SUBCUTANEOUS at 11:10

## 2018-12-21 RX ADMIN — FLUCONAZOLE 200 MILLIGRAM(S): 150 TABLET ORAL at 00:11

## 2018-12-21 RX ADMIN — Medication 500 MILLIGRAM(S): at 11:12

## 2018-12-21 RX ADMIN — FLUCONAZOLE 200 MILLIGRAM(S): 150 TABLET ORAL at 11:11

## 2018-12-21 NOTE — DISCHARGE NOTE ADULT - PLAN OF CARE
Symptom management Cardiac w/u without acute findings.   Pain possible referred/GI source related. Disease management c/b bile leak w. drain placement  Surgery consults, no interventions needed.   To f/u as outpt for drain removal in 2 weeks.  Continue previously prescribed antibiotics/antifungal.

## 2018-12-21 NOTE — PROGRESS NOTE ADULT - PROBLEM SELECTOR PLAN 1
Admitted to CDU for r/o ACS  Tele Monitoring  Cards Consult   Ekg: Sinus Tachy  Trops x 2 Neg, trend  D-Dimer +3818 --> CT Angio w/o PE. Does note Small R Pleural effusion, decreased from previous study.  Pending TTE

## 2018-12-21 NOTE — PROGRESS NOTE ADULT - SUBJECTIVE AND OBJECTIVE BOX
Patient is a 38y old  Male who presents with a chief complaint of chest pain (21 Dec 2018 10:05)    Patient seen and examined at bedside.  S: c/o some epigastric pain. No n/v, f/c.     ALLERGIES:  adhesives (Rash)  cortisone (Rash)    MEDICATIONS:  acetaminophen   Tablet .. 650 milliGRAM(s) Oral every 6 hours PRN  ciprofloxacin     Tablet 500 milliGRAM(s) Oral every 12 hours  docusate sodium 100 milliGRAM(s) Oral three times a day PRN  fluconAZOLE   Tablet 200 milliGRAM(s) Oral daily  senna 1 Tablet(s) Oral daily PRN    Vital Signs Last 24 Hrs  T(F): 98.8 (21 Dec 2018 08:15), Max: 98.8 (21 Dec 2018 08:15)  HR: 94 (21 Dec 2018 08:15) (83 - 103)  BP: 113/56 (21 Dec 2018 08:15) (109/65 - 138/81)  RR: 14 (21 Dec 2018 08:15) (14 - 19)  SpO2: 98% (21 Dec 2018 08:15) (97% - 100%)  I&O's Summary    20 Dec 2018 07:01  -  21 Dec 2018 07:00  --------------------------------------------------------  IN: 0 mL / OUT: 20 mL / NET: -20 mL    21 Dec 2018 07:01  -  21 Dec 2018 10:19  --------------------------------------------------------  IN: 300 mL / OUT: 0 mL / NET: 300 mL    PHYSICAL EXAM:  General: NAD, A/O x 3  ENT: MMM  Lungs: Clear to auscultation bilaterally  Cardio: RR, S1/S2, No murmurs  Abdomen: Soft, NT/ND, Normal active Bowel Sounds. REMI drains x 2-SS (Most lateral w. serous drainage & more medial w. Serosang drainage.)  Extremities: No cyanosis, No edema    LABS:                        11.4   11.2  )-----------( 612      ( 20 Dec 2018 14:40 )             35.9     12-21    138  |  101  |  24  ----------------------------<  124  3.9   |  27  |  0.79    Ca    9.8      21 Dec 2018 09:48    TPro  8.9  /  Alb  2.4  /  TBili  0.3  /  DBili  x   /  AST  26  /  ALT  60  /  AlkPhos  146  12-20    eGFR if Non African American: 113 mL/min/1.73M2 (12-21-18 @ 09:48)  eGFR if : 131 mL/min/1.73M2 (12-21-18 @ 09:48)      CARDIAC MARKERS ( 20 Dec 2018 22:20 )  <.017 ng/mL / x     / x     / x     / x      CARDIAC MARKERS ( 20 Dec 2018 14:40 )  <.017 ng/mL / x     / x     / x     / x          RADIOLOGY & ADDITIONAL TESTS:  < from: CT Angio Chest w/ IV Cont (12.20.18 @ 17:30) >  1.  Limited study. The segmental and subsegmental branches of the   pulmonary arteries are not evaluated secondary to poor opacification. No   central or pulmonary emboli.  2.  No aortic aneurysm or dissection.  3.  Small right pleural effusion and subsegmental atelectasis of right   lower lobe, decreased.    < from: Xray Chest 2 Views PA/Lat (12.20.18 @ 14:47) >  Clear lungs.    Care Discussed with Consultants/Other Providers: Dr. Guillaume.

## 2018-12-21 NOTE — DISCHARGE NOTE ADULT - CARE PROVIDER_API CALL
Pérez Stringer (), Internal Medicine  [address] [place] [address]  Phone: [phone]  Fax: [phone]    Santino Pritchett), Surgery  [address] [place] [address]  Phone: [phone]  Fax: [phone]

## 2018-12-21 NOTE — CONSULT NOTE ADULT - SUBJECTIVE AND OBJECTIVE BOX
CHIEF COMPLAINT:  Patient is a 38y old  Male who presents with a chief complaint of chest pain (20 Dec 2018 19:47)    HPI:  hpi- 38 year old male with chronic cholecystitis/cholelithiasis s/p laparoscopic cholecystostomy tube placement in August 2018 and then s/p laparoscopic cholecystectomy 12/3/18 with post-operative bile leak, s/p ERCP with stent insertion into bile duct, c/o sudden onset of chest pain early today, while in the car with family/friend, aching left sided chest pain, 8/10, non radiating, lasted for 10-15 minutes, associated with sweating, and difficulty breathing, relieved after arriving to ED, no n/v, no palpitations no belly pain, no dysuria. is on antibiotics for chronic cholecystitis  for another 1-2 weeks. no prior chest pain episodes in the past. (20 Dec 2018 19:47)      PMH:   Prediabetes  GERD (gastroesophageal reflux disease)  Scoliosis  CHRISTINE (obstructive sleep apnea)  Calculus of gallbladder with cholecystitis without biliary obstruction, unspecified cholecystitis acuity  Gall bladder pain      PSH:   S/P laparoscopic cholecystectomy  H/O abdominal surgery  No significant past surgical history        FAMILY HISTORY:  Family history of kidney stone (Sibling)  Family history of peripheral vascular disease  Family history of thyroid disease  Family history of cholelithiasis  Family history of Parkinson disease  Family history of diabetes mellitus    Uncle with MI in 60s      SOCIAL HISTORY:  Smoking:  No      ALLERGIES:  adhesives (Rash)  cortisone (Rash)      Home Medications:  acetaminophen 325 mg oral tablet: 2 tab(s) orally every 6 hours, As needed (20 Dec 2018 18:03)  docusate sodium 100 mg oral capsule: 1 cap(s) orally 3 times a day, As Needed (20 Dec 2018 18:03)  senna oral tablet: 1 tab(s) orally once a day (20 Dec 2018 18:03)      MEDICATIONS:  acetaminophen   Tablet .. 650 milliGRAM(s) Oral every 6 hours PRN  ciprofloxacin     Tablet 500 milliGRAM(s) Oral every 12 hours  docusate sodium 100 milliGRAM(s) Oral three times a day PRN  fluconAZOLE   Tablet 200 milliGRAM(s) Oral daily  senna 1 Tablet(s) Oral daily PRN      REVIEW OF SYSTEMS:  CONSTITUTIONAL: No fever, weight loss, or fatigue  EYES: No eye pain, visual disturbances, or discharge  ENMT:  No difficulty hearing, tinnitus, vertigo; No sinus or throat pain  NECK: No pain or stiffness  BREASTS: No pain, masses, or nipple discharge  RESPIRATORY: No cough, wheezing, chills or hemoptysis; No shortness of breath  CARDIOVASCULAR: See HPI  GASTROINTESTINAL: No abdominal or epigastric pain. No nausea, vomiting, or hematemesis; No diarrhea or constipation. No melena or hematochezia.  GENITOURINARY: No dysuria, frequency, hematuria, or incontinence  NEUROLOGICAL: No headaches, memory loss, loss of strength, numbness, or tremors  SKIN: No itching, burning, rashes, or lesions   LYMPH NODES: No enlarged glands  ENDOCRINE: No heat or cold intolerance; No hair loss  MUSCULOSKELETAL: No joint pain or swelling; No muscle, back, or extremity pain  PSYCHIATRIC: No depression, anxiety, mood swings, or difficulty sleeping  HEME/LYMPH: No easy bruising, or bleeding gums  ALLERGY AND IMMUNOLOGIC: No hives or eczema    PHYSICAL EXAM:  T(C): 37.1 (12-21-18 @ 08:15), Max: 37.1 (12-21-18 @ 08:15)  HR: 94 (12-21-18 @ 08:15) (83 - 103)  BP: 113/56 (12-21-18 @ 08:15) (109/65 - 138/81)  RR: 14 (12-21-18 @ 08:15) (14 - 19)  SpO2: 98% (12-21-18 @ 08:15) (97% - 100%)  Wt(kg): --    GENERAL: NAD, well-groomed, well-developed  HEAD:  Atraumatic, Normocephalic  EYES: EOMI, conjunctiva and sclera clear  ENT: Moist mucous membranes,  NECK: Supple, No JVD, no bruits  CHEST/LUNG: Clear to ausculation and percussion bilaterally; No rales, rhonchi, wheezing, or rubs  HEART: Regular rate and rhythm; No murmurs, rubs, or gallops PMI non displaced.  ABDOMEN: Soft, Nontender, Nondistended; Bowel sounds present  EXTREMITIES:   No clubbing, cyanosis, or edema  SKIN: No rashes or lesions  NERVOUS SYSTEM:  Alert & Oriented X3, No focal deficits    Cardiovascular Diagnostic Testing:  ECG:  < from: 12 Lead ECG (12.20.18 @ 14:12) >    Ventricular Rate 101 BPM    Atrial Rate 101 BPM    P-R Interval 152 ms    QRS Duration 86 ms    Q-T Interval 340 ms    QTC Calculation(Bezet) 440 ms    P Axis 41 degrees    R Axis 76 degrees    T Axis 36 degrees    Diagnosis Line Sinus tachycardia  Otherwise normal ECG  When compared with ECG of 20-NOV-2018 12:56,  No significant change was found  Confirmed by ZULEIMA MESSINA, [facility] ([id number]) on 12/21/2018 8:51:32 AM    < end of copied text >      LABS:                        11.4   11.2  )-----------( 612      ( 20 Dec 2018 14:40 )             35.9     12-20    138  |  103  |  23  ----------------------------<  111<H>  4.2   |  30  |  1.05    Ca    9.6      20 Dec 2018 14:40    TPro  8.9<H>  /  Alb  2.4<L>  /  TBili  0.3  /  DBili  x   /  AST  26  /  ALT  60<H>  /  AlkPhos  146<H>  12-20      CARDIAC MARKERS ( 20 Dec 2018 22:20 )  <.017 ng/mL / x     / x     / x     / x      CARDIAC MARKERS ( 20 Dec 2018 14:40 )  <.017 ng/mL / x     / x     / x     / x          Telemetry:  sinus    IMAGING:    < from: CT Angio Chest w/ IV Cont (12.20.18 @ 17:30) >    EXAM:  CT ANGIO CHEST (W)AW IC      PROCEDURE DATE:  12/20/2018        INTERPRETATION:  CTA CHEST WITH CONTRAST (CT PULMONARY EMBOLUS)    INDICATION: chest pain, shortness of breath high d dimer    TECHNIQUE: Enhanced helical images were obtained of the chest. Coronal   and sagittal images were reconstructed.  Images were obtained after the   uneventful administration of 90 cc of nonionic intravenous contrast   (Omnipaque 350).  10 cc of nonionic intravenous contrast (Omnipaque 350)   was discarded. Maximum intensity projection images were generated.    COMPARISON: CT chest 12/13/2018.    FINDINGS:     PULMONARY VESSELS:  No central pulmonary emboli. The segmental and   subsegmental branches of the pulmonary arteries are not evaluated   secondary to poor opacification. Main pulmonary artery normal in diameter.    HEART AND VASCULATURE: Normal heart size without pericardial effusion. No   CT evidence of right heart strain. No aortic aneurysm or dissection.    LUNGS, AIRWAYS, PLEURA: Patent central airways.    Tip of the right pigtail catheter within the right basilar pleural space.   Interval decrease of small right pleural effusion and decrease of right   lower lobe atelectasis. No new consolidation or infiltrates.    MEDIASTINUM, LYMPH NODES: No lymphadenopathy.    UPPER ABDOMEN:. Biliary stent is partially visualized. Heterogeneous   enhancement of the liver, possibly secondary to phase of the contrast.    BONES AND SOFT TISSUES: No aggressive osseous lesion.    LOWER NECK: Unremarkable.    IMPRESSION:     1.  Limited study. The segmental and subsegmental branches of the   pulmonary arteries are not evaluated secondary to poor opacification. No   central or pulmonary emboli.    2.  No aortic aneurysm or dissection.    3.  Small right pleural effusion and subsegmental atelectasis of right   lower lobe, decreased.        KEARA CIFUENTES   This document has been electronically signed. Dec 20 2018  5:55PM    < end of copied text >    < from: Xray Chest 2 Views PA/Lat (12.20.18 @ 14:47) >    EXAM:  XR CHEST PA LAT 2V      PROCEDURE DATE:  12/20/2018        INTERPRETATION:  PROCEDURE: PA and lateral views of the chest.    CLINICAL INFORMATION: Chest pain.    COMPARISON: 12/12/2018.    FINDINGS:        Lungs: The right hemidiaphragm is elevated. The lungs are clear.  Heart: The heart is normal in size.  Mediastinum: The mediastinum is within normal limits.    A right upper abdominal drainage catheter is noted.    IMPRESSION:    Clear lungs.      < end of copied text >

## 2018-12-21 NOTE — PROGRESS NOTE ADULT - ATTENDING COMMENTS
I have personally seen and examined patient on the above date.  I discussed the case with NP, Braulio Gaming  and I agree with findings and plan as detailed per note above, which I have amended where appropriate.      pt to go for stress test  Dr. Pritchett to evaluate drain  pt can be discharged if stress test is negative

## 2018-12-21 NOTE — PROGRESS NOTE ADULT - ASSESSMENT
39yo male w. PMH Obesity, CHRISTINE (not on CPAP), GERD, Cholecystitis w. Chronic Cholelithiasis s/p lap cholecystostomy tube placement August 2018 and then s/p lap Cholecystectomy 12/3/18 c/b post-op bile leak, s/p ERCP w. sphincterotomy, CBD stent placement and IR pigtail placement for drainage of the subdiaphragmatic collection. Now p/w c/o sudden onset acute chest pain. Admit for r/o ACS. 39yo male w. PMH Obesity, CHRISTINE (not on CPAP), GERD, Cholecystitis w. Chronic Cholelithiasis s/p lap cholecystostomy tube placement August 2018 and then s/p lap Cholecystectomy 12/3/18 c/b post-op bile leak, s/p ERCP w. sphincterotomy, CBD stent placement and IR pigtail placement for drainage of the subdiaphragmatic collection (upsizing and repositioning with IR 12/10 at [facility]). Now p/w c/o sudden onset acute chest pain. Admit for r/o ACS.

## 2018-12-21 NOTE — DISCHARGE NOTE ADULT - CARE PLAN
Principal Discharge DX:	Chest pain, unspecified type  Goal:	Symptom management  Assessment and plan of treatment:	Cardiac w/u without acute findings.   Pain possible referred/GI source related.  Secondary Diagnosis:	S/P laparoscopic cholecystectomy  Goal:	Disease management  Assessment and plan of treatment:	c/b bile leak w. drain placement  Surgery consults, no interventions needed.   To f/u as outpt for drain removal in 2 weeks.  Continue previously prescribed antibiotics/antifungal.

## 2018-12-21 NOTE — CONSULT NOTE ADULT - SUBJECTIVE AND OBJECTIVE BOX
patient well known to me-multiple bilary procedures as well as IR, ERCP+S+stent, for severe acute and chronic cholecystitis.  Recently discharged from [facility] where he was admitted for 10 days.    now here to rule out MI.    exam:  sitting comfortable in chair  Heent-sclera anicteric  lungs-clear to P&A  abdomen-soft and non tender 2 Wyatt cavazos drains intact      labs:  Complete Blood Count (12.21.18 @ 09:48)    WBC Count: 8.3 K/uL    RBC Count: 4.53 M/uL    Hemoglobin: 12.1 g/dL    Hematocrit: 37.7 %    Mean Cell Volume: 83.1 fl    Mean Cell Hemoglobin: 26.6 pg    Mean Cell Hemoglobin Conc: 32.0 gm/dL    Red Cell Distrib Width: 13.2 %    Platelet Count - Automated: 645 K/uL    Comprehensive Metabolic Panel (12.20.18 @ 14:40)    Sodium, Serum: 138 mmol/L    Potassium, Serum: 4.2 mmol/L    Chloride, Serum: 103 mmol/L    Carbon Dioxide, Serum: 30 mmol/L    Anion Gap, Serum: 5 mmol/L    Blood Urea Nitrogen, Serum: 23 mg/dL    Creatinine, Serum: 1.05 mg/dL    Glucose, Serum: 111 mg/dL    Calcium, Total Serum: 9.6 mg/dL    Protein Total, Serum: 8.9 g/dL    Albumin, Serum: 2.4 g/dL    Bilirubin Total, Serum: 0.3 mg/dL    Alkaline Phosphatase, Serum: 146 U/L    Aspartate Aminotransferase (AST/SGOT): 26 U/L    Alanine Aminotransferase (ALT/SGPT): 60 U/L DA

## 2018-12-21 NOTE — CONSULT NOTE ADULT - ASSESSMENT
a/p    patient had stress test this morning and awaiting results    no abdominal issues at all, from my point of view if medicine says ok he can go home and I will remove drains in 2 weeks.      thank you

## 2018-12-21 NOTE — DISCHARGE NOTE ADULT - PATIENT PORTAL LINK FT
You can access the [facility] Patient Portal, offered by [facility], by registering with the following website: [email or web address]

## 2018-12-21 NOTE — PROGRESS NOTE ADULT - PROBLEM SELECTOR PLAN 3
c/b post-op bile leak   c/w home Cipro & Fluconazole  WBC 11.3 on admit, trend. Afebrile. c/b post-op bile leak   c/w home Cipro (x 7 day course per ID note on 12.17) & Fluconazole (For + Abscess gallbladder fossa collection Culture: Candida Albicans x 14d course per ID note on 12.17)  WBC 11.3 on admit, trend. Afebrile.  Consult Surgery

## 2018-12-21 NOTE — DISCHARGE NOTE ADULT - MEDICATION SUMMARY - MEDICATIONS TO TAKE
I will START or STAY ON the medications listed below when I get home from the hospital:    acetaminophen 325 mg oral tablet  -- 2 tab(s) by mouth every 6 hours, As needed  -- Indication: For Pain    fluconazole 200 mg oral tablet  -- 1 tab(s) by mouth once a day  -- Indication: For Infection    docusate sodium 100 mg oral capsule  -- 1 cap(s) by mouth 3 times a day, As Needed  -- Indication: For Constipation    senna oral tablet  -- 1 tab(s) by mouth once a day  -- Indication: For Constipation    ciprofloxacin 500 mg oral tablet  -- 1 tab(s) by mouth every 12 hours  -- Indication: For Infection

## 2018-12-21 NOTE — DISCHARGE NOTE ADULT - HOME CARE AGENCY
[facility] [place] [phone]  You should receive a phone call tomorrow to set up appointment at home. Requested MANSI Figueredo to return to your home.

## 2018-12-21 NOTE — DISCHARGE NOTE ADULT - HOSPITAL COURSE
39yo male w. PMH Obesity, CHRISTINE (not on CPAP), GERD, Cholecystitis w. Chronic Cholelithiasis s/p lap cholecystostomy tube placement August 2018 and then s/p lap Cholecystectomy 12/3/18 c/b post-op bile leak, s/p ERCP w. sphincterotomy, CBD stent placement and IR pigtail placement for drainage of the subdiaphragmatic collection (upsizing and repositioning with IR 12/10 at [facility]). Now p/w c/o sudden onset acute chest pain. Admit for r/o ACS.   Cardiac w/u w/o acute findings. Surgery consulted- no interventions needed at this time.

## 2018-12-26 PROCEDURE — 74176 CT ABD & PELVIS W/O CONTRAST: CPT

## 2018-12-26 PROCEDURE — 85610 PROTHROMBIN TIME: CPT

## 2018-12-26 PROCEDURE — 84100 ASSAY OF PHOSPHORUS: CPT

## 2018-12-26 PROCEDURE — 80076 HEPATIC FUNCTION PANEL: CPT

## 2018-12-26 PROCEDURE — C1894: CPT

## 2018-12-26 PROCEDURE — 87205 SMEAR GRAM STAIN: CPT

## 2018-12-26 PROCEDURE — 80048 BASIC METABOLIC PNL TOTAL CA: CPT

## 2018-12-26 PROCEDURE — 99285 EMERGENCY DEPT VISIT HI MDM: CPT | Mod: 25

## 2018-12-26 PROCEDURE — 74018 RADEX ABDOMEN 1 VIEW: CPT

## 2018-12-26 PROCEDURE — C1874: CPT

## 2018-12-26 PROCEDURE — 75984 XRAY CONTROL CATHETER CHANGE: CPT

## 2018-12-26 PROCEDURE — 96374 THER/PROPH/DIAG INJ IV PUSH: CPT

## 2018-12-26 PROCEDURE — 93005 ELECTROCARDIOGRAM TRACING: CPT

## 2018-12-26 PROCEDURE — 71045 X-RAY EXAM CHEST 1 VIEW: CPT

## 2018-12-26 PROCEDURE — 83605 ASSAY OF LACTIC ACID: CPT

## 2018-12-26 PROCEDURE — 87070 CULTURE OTHR SPECIMN AEROBIC: CPT

## 2018-12-26 PROCEDURE — 85730 THROMBOPLASTIN TIME PARTIAL: CPT

## 2018-12-26 PROCEDURE — C1729: CPT

## 2018-12-26 PROCEDURE — 82962 GLUCOSE BLOOD TEST: CPT

## 2018-12-26 PROCEDURE — 81001 URINALYSIS AUTO W/SCOPE: CPT

## 2018-12-26 PROCEDURE — 71260 CT THORAX DX C+: CPT

## 2018-12-26 PROCEDURE — 74177 CT ABD & PELVIS W/CONTRAST: CPT

## 2018-12-26 PROCEDURE — 86901 BLOOD TYPING SEROLOGIC RH(D): CPT

## 2018-12-26 PROCEDURE — 94640 AIRWAY INHALATION TREATMENT: CPT

## 2018-12-26 PROCEDURE — C1769: CPT

## 2018-12-26 PROCEDURE — 87186 SC STD MICRODIL/AGAR DIL: CPT

## 2018-12-26 PROCEDURE — 87040 BLOOD CULTURE FOR BACTERIA: CPT

## 2018-12-26 PROCEDURE — 83735 ASSAY OF MAGNESIUM: CPT

## 2018-12-26 PROCEDURE — 49423 EXCHANGE DRAINAGE CATHETER: CPT

## 2018-12-26 PROCEDURE — 83690 ASSAY OF LIPASE: CPT

## 2018-12-26 PROCEDURE — 85027 COMPLETE CBC AUTOMATED: CPT

## 2018-12-26 PROCEDURE — 96375 TX/PRO/DX INJ NEW DRUG ADDON: CPT

## 2018-12-26 PROCEDURE — 49406 IMAGE CATH FLUID PERI/RETRO: CPT

## 2018-12-26 PROCEDURE — 86900 BLOOD TYPING SEROLOGIC ABO: CPT

## 2018-12-26 PROCEDURE — 82150 ASSAY OF AMYLASE: CPT

## 2018-12-26 PROCEDURE — 86850 RBC ANTIBODY SCREEN: CPT

## 2018-12-26 PROCEDURE — 87075 CULTR BACTERIA EXCEPT BLOOD: CPT

## 2018-12-26 PROCEDURE — 84484 ASSAY OF TROPONIN QUANT: CPT

## 2018-12-26 PROCEDURE — 80053 COMPREHEN METABOLIC PANEL: CPT

## 2018-12-26 PROCEDURE — C1887: CPT

## 2018-12-28 ENCOUNTER — APPOINTMENT (OUTPATIENT)
Dept: SURGERY | Facility: CLINIC | Age: 38
End: 2018-12-28
Payer: MEDICAID

## 2018-12-28 VITALS
TEMPERATURE: 98.5 F | HEIGHT: 65 IN | HEART RATE: 91 BPM | SYSTOLIC BLOOD PRESSURE: 118 MMHG | BODY MASS INDEX: 36.65 KG/M2 | WEIGHT: 220 LBS | OXYGEN SATURATION: 99 % | RESPIRATION RATE: 18 BRPM | DIASTOLIC BLOOD PRESSURE: 82 MMHG

## 2018-12-28 PROCEDURE — 99024 POSTOP FOLLOW-UP VISIT: CPT

## 2019-01-04 ENCOUNTER — APPOINTMENT (OUTPATIENT)
Dept: SURGERY | Facility: CLINIC | Age: 39
End: 2019-01-04

## 2019-01-18 ENCOUNTER — APPOINTMENT (OUTPATIENT)
Dept: SURGERY | Facility: CLINIC | Age: 39
End: 2019-01-18
Payer: MEDICAID

## 2019-01-18 VITALS
SYSTOLIC BLOOD PRESSURE: 110 MMHG | TEMPERATURE: 98.1 F | HEART RATE: 90 BPM | BODY MASS INDEX: 40.48 KG/M2 | DIASTOLIC BLOOD PRESSURE: 70 MMHG | OXYGEN SATURATION: 98 % | HEIGHT: 62 IN | RESPIRATION RATE: 18 BRPM | WEIGHT: 220 LBS

## 2019-01-18 DIAGNOSIS — K81.0 ACUTE CHOLECYSTITIS: ICD-10-CM

## 2019-01-18 PROCEDURE — 99024 POSTOP FOLLOW-UP VISIT: CPT

## 2019-01-30 ENCOUNTER — APPOINTMENT (OUTPATIENT)
Dept: ORTHOPEDIC SURGERY | Facility: CLINIC | Age: 39
End: 2019-01-30

## 2019-02-08 ENCOUNTER — APPOINTMENT (OUTPATIENT)
Dept: SURGERY | Facility: CLINIC | Age: 39
End: 2019-02-08

## 2019-05-21 ENCOUNTER — APPOINTMENT (OUTPATIENT)
Dept: RADIOLOGY | Facility: HOSPITAL | Age: 39
End: 2019-05-21
Payer: COMMERCIAL

## 2019-05-21 ENCOUNTER — OUTPATIENT (OUTPATIENT)
Dept: OUTPATIENT SERVICES | Facility: HOSPITAL | Age: 39
LOS: 1 days | End: 2019-05-21
Payer: COMMERCIAL

## 2019-05-21 DIAGNOSIS — Z00.8 ENCOUNTER FOR OTHER GENERAL EXAMINATION: ICD-10-CM

## 2019-05-21 DIAGNOSIS — Z90.49 ACQUIRED ABSENCE OF OTHER SPECIFIED PARTS OF DIGESTIVE TRACT: Chronic | ICD-10-CM

## 2019-05-21 DIAGNOSIS — Z98.890 OTHER SPECIFIED POSTPROCEDURAL STATES: Chronic | ICD-10-CM

## 2019-05-21 PROCEDURE — 74019 RADEX ABDOMEN 2 VIEWS: CPT | Mod: 26

## 2019-05-21 PROCEDURE — 74019 RADEX ABDOMEN 2 VIEWS: CPT

## 2019-05-29 NOTE — ED PROVIDER NOTE - NEURO NEGATIVE STATEMENT, MLM
Patient ID: Rob is a 45 year old male.    Chief Complaint   Patient presents with   • Annual Exam     wants refill of scalp ointment (halobetasol 0.05% for mild psoriasis     HPI     New patient. Used to see Dr. Morelos.     Slight scalp psoriasis. Diagnosed in 20's. Saw a derm in [place]. Needs refill on halobetasol 0.05% ointment. Uses one application every 1-2 weeks.     Former smoker, quit 20 years ago.   Drinks alcohol rarely   Tries to eat healthy   Exercises a few times per week  Works in [place] [employer]    Unsure of last tetanus shot  Does not get yearly flu shots    Review of Systems   All other systems reviewed and are negative.    Current Outpatient Medications   Medication Sig Dispense Refill   • halobetasol (ULTRAVATE) 0.05 % ointment Apply topically 2 times daily.       No current facility-administered medications for this visit.      Problem List Items Addressed This Visit        Other    Health maintenance examination - Primary    Relevant Orders    CBC & AUTO DIFFERENTIAL    COMPREHENSIVE METABOLIC PANEL    LIPID PANEL WITH REFLEX    THYROID STIMULATING HORMONE REFLEX    URINALYSIS & REFLEX MICRO        No past medical history on file.  No past surgical history on file.  No family history on file.  Social History     Socioeconomic History   • Marital status: /Civil Union     Spouse name: Not on file   • Number of children: Not on file   • Years of education: Not on file   • Highest education level: Not on file   Occupational History   • Not on file   Social Needs   • Financial resource strain: Not on file   • Food insecurity:     Worry: Not on file     Inability: Not on file   • Transportation needs:     Medical: Not on file     Non-medical: Not on file   Tobacco Use   • Smoking status: Not on file   Substance and Sexual Activity   • Alcohol use: Not on file   • Drug use: Not on file   • Sexual activity: Not on file   Lifestyle   • Physical activity:     Days per week: Not on file      Minutes per session: Not on file   • Stress: Not on file   Relationships   • Social connections:     Talks on phone: Not on file     Gets together: Not on file     Attends Hindu service: Not on file     Active member of club or organization: Not on file     Attends meetings of clubs or organizations: Not on file     Relationship status: Not on file   • Intimate partner violence:     Fear of current or ex partner: Not on file     Emotionally abused: Not on file     Physically abused: Not on file     Forced sexual activity: Not on file   Other Topics Concern   • Not on file   Social History Narrative   • Not on file       Patient's medications, allergies, past medical, surgical, social and family histories were reviewed and updated as appropriate.      Visit Vitals  /71 ([place] [facility] [place] Position: Sitting, Cuff Size: Large Adult)   Pulse 61   Temp 97.3 °F (36.3 °C) (Oral)   Ht 6' 3\" (1.905 m)   Wt 106.6 kg (235 lb)   BMI 29.37 kg/m²       Physical Exam   Constitutional: He is oriented to person, place, and time. He appears well-developed and well-nourished. No distress.   HENT:   Head: Normocephalic and atraumatic.   Mouth/Throat: Oropharynx is clear and moist. No oropharyngeal exudate.   Eyes: Pupils are equal, round, and reactive to light. Conjunctivae and EOM are normal. Right eye exhibits no discharge. Left eye exhibits no discharge. No scleral icterus.   Neck: Normal range of motion. Neck supple. No thyromegaly present.   Cardiovascular: Normal rate, regular rhythm and normal heart sounds. Exam reveals no gallop and no friction rub.   No murmur heard.  Pulmonary/Chest: Effort normal and breath sounds normal. No respiratory distress. He has no wheezes. He has no rales.   Abdominal: Soft. He exhibits no distension. There is no tenderness. There is no rebound and no guarding.   Musculoskeletal: He exhibits no edema.   Neurological: He is alert and oriented to person, place, and time. No cranial  nerve deficit.   Skin: Skin is warm and dry. He is not diaphoretic.   Slight erythema above left ear   Psychiatric: He has a normal mood and affect. His behavior is normal. Judgment and thought content normal.         Rob was seen today for annual exam.    Diagnoses and all orders for this visit:    Health maintenance examination  -     CBC & AUTO DIFFERENTIAL; Future  -     COMPREHENSIVE METABOLIC PANEL; Future  -     LIPID PANEL WITH REFLEX; Future  -     THYROID STIMULATING HORMONE REFLEX; Future  -     URINALYSIS & REFLEX MICRO; Future      Refilled halobetasol ointment - uses sparingly    RTC for fasting labs    Consider Tdap booster    Medical compliance with plan discussed and risks of non-compliance reviewed.    Patient education completed on disease process, etiology & prognosis.    Patient expresses understanding of the plan.     Bridgette Armstrong MD     no loss of consciousness, no gait abnormality, no headache, no sensory deficits, and no weakness.

## 2019-12-05 NOTE — PROGRESS NOTE ADULT - SUBJECTIVE AND OBJECTIVE BOX
Patient is a 38 year old  Male who presents with a chief complaint of colon cancer (13 Aug 2018 03:38)    Patient seen and examined this morning, reports mild abdominal discomfort, some sob with movement    MEDICATIONS  (STANDING):  enoxaparin Injectable 40 milliGRAM(s) SubCutaneous daily  meropenem  IVPB      meropenem  IVPB 1000 milliGRAM(s) IV Intermittent every 8 hours  pantoprazole    Tablet 40 milliGRAM(s) Oral before breakfast  sodium chloride 0.9%. 1000 milliLiter(s) (75 mL/Hr) IV Continuous <Continuous>    MEDICATIONS  (PRN):  acetaminophen   Tablet 650 milliGRAM(s) Oral every 6 hours PRN For Temp greater than 38 C (100.4 F)  acetaminophen   Tablet. 650 milliGRAM(s) Oral every 6 hours PRN Mild Pain (1 - 3)  HYDROmorphone  Injectable 1 milliGRAM(s) IV Push every 3 hours PRN Severe Pain (7 - 10)  ketorolac   Injectable 15 milliGRAM(s) IV Push every 6 hours PRN Moderate Pain (4 - 6)  morphine  - Injectable 2 milliGRAM(s) IV Push every 4 hours PRN Moderate Pain (4 - 6)  ondansetron Injectable 4 milliGRAM(s) IV Push every 6 hours PRN Nausea and/or Vomiting  oxyCODONE    IR 5 milliGRAM(s) Oral every 4 hours PRN Moderate Pain (4 - 6)      REVIEW OF SYSTEMS:  CONSTITUTIONAL: No fever, weight loss, or fatigue  EYES: No eye pain, visual disturbances, or discharge  ENMT:  No difficulty hearing, tinnitus, vertigo; No sinus or throat pain  NECK: No pain or stiffness  RESPIRATORY: No cough, wheezing, chills or hemoptysis; shortness of breath with movement  CARDIOVASCULAR: No chest pain, palpitations, dizziness, or leg swelling  GASTROINTESTINAL:  intermittent abdominal pain. No nausea, vomiting, or hematemesis; No diarrhea or constipation. No melena or hematochezia.  GENITOURINARY: No dysuria, frequency, hematuria, or incontinence  NEUROLOGICAL: No headaches, memory loss, loss of strength, numbness, or tremors  SKIN: No itching, burning, rashes, or lesions   ENDOCRINE: No heat or cold intolerance; No hair loss  MUSCULOSKELETAL: No joint pain or swelling; No muscle, back, or extremity pain  PSYCHIATRIC: No depression, anxiety, mood swings, or difficulty sleeping  HEME/LYMPH: No easy bruising, or bleeding gums  ALLERY AND IMMUNOLOGIC: No hives or eczema          PHYSICAL EXAM:  T(C): 37.2 (08-18-18 @ 05:32), Max: 37.6 (08-17-18 @ 21:12)  HR: 79 (08-18-18 @ 05:32) (78 - 79)  BP: 117/80 (08-18-18 @ 05:32) (117/68 - 125/73)  RR: 14 (08-18-18 @ 05:32) (14 - 15)  SpO2: 100% (08-18-18 @ 05:32) (97% - 100%)    I&O's Summary    17 Aug 2018 07:01  -  18 Aug 2018 07:00  --------------------------------------------------------  IN: 465 mL / OUT: 0 mL / NET: 465 mL      GENERAL: NAD, well-groomed, well-developed  HEAD:  Atraumatic, Normocephalic  EYES: EOMI, PERRL, conjunctiva and sclera clear  ENMT: Moist mucous membranes, Good dentition, No lesions  NECK: Supple, No JVD, Normal thyroid  NERVOUS SYSTEM:  Alert & Oriented X3, Good concentration;  CHEST/LUNG: Clear to ascultation bilaterally; No rales, rhonchi, wheezing, or rubs  HEART: Regular rate and rhythm; S1/S2, No murmurs, rubs, or gallops  ABDOMEN: Soft, mild tenderness, + drain in place, blood tinged fluid; Bowel sounds present  EXTREMITIES:  2+ Peripheral Pulses, No clubbing, cyanosis, or edema  LYMPH: No lymphadenopathy noted  SKIN: No rashes or lesions    LABS:                        12.8   9.9   )-----------( 351      ( 18 Aug 2018 06:26 )             40.0     08-18    134<L>  |  100  |  13  ----------------------------<  95  3.9   |  30  |  0.60    Ca    8.8      18 Aug 2018 06:26  Phos  3.9     08-17  Mg     2.3     08-17    TPro  7.7  /  Alb  2.7<L>  /  TBili  0.4  /  DBili  x   /  AST  29  /  ALT  81<H>  /  AlkPhos  141<H>  08-18        Culture - Blood (collected 08-15-18 @ 06:24)  Source: .Blood Blood  Preliminary Report (08-16-18 @ 12:01):    No growth to date.    Culture - Blood (collected 08-15-18 @ 06:24)  Source: .Blood Blood  Preliminary Report (08-16-18 @ 12:01):    No growth to date.        RADIOLOGY & ADDITIONAL TESTS:    Imaging Personally Reviewed:  [x] YES  [ ] NO    Consultant(s) Notes Reviewed:  [x] YES  [ ] NO    Care Discussed with Consultants/Other Providers [x] YES  [ ] NO Yes

## 2020-05-11 NOTE — PHYSICAL THERAPY INITIAL EVALUATION ADULT - GAIT DISTANCE, PT EVAL
I spoke with Lucina and confirmed Dr. Hooks has not prescribed oxygen for the patient or evaluated his oxygen needs. Dr. Rubio signed a prescription for oxygen on 2/27/20. This should be managed by Dr. Rubio's office or patient will need to be set up with general pulmonology to manage oxygen use. Dr. Hooks is managing the patient's pulmonary nodules.     Nicole Ramirez RN   [facility] Care Coordinator  [facility]      50 feet

## 2020-09-01 ENCOUNTER — APPOINTMENT (OUTPATIENT)
Dept: DERMATOLOGY | Facility: CLINIC | Age: 40
End: 2020-09-01

## 2020-10-15 ENCOUNTER — APPOINTMENT (OUTPATIENT)
Dept: RHEUMATOLOGY | Facility: CLINIC | Age: 40
End: 2020-10-15
Payer: COMMERCIAL

## 2020-10-15 VITALS
DIASTOLIC BLOOD PRESSURE: 75 MMHG | SYSTOLIC BLOOD PRESSURE: 116 MMHG | TEMPERATURE: 97.6 F | HEART RATE: 72 BPM | HEIGHT: 62 IN | OXYGEN SATURATION: 97 % | BODY MASS INDEX: 37.24 KG/M2 | WEIGHT: 202.38 LBS | RESPIRATION RATE: 14 BRPM

## 2020-10-15 PROCEDURE — 99204 OFFICE O/P NEW MOD 45 MIN: CPT

## 2020-10-21 RX ORDER — OXYCODONE AND ACETAMINOPHEN 5; 325 MG/1; MG/1
5-325 TABLET ORAL
Qty: 20 | Refills: 0 | Status: DISCONTINUED | COMMUNITY
Start: 2018-12-28 | End: 2020-10-21

## 2020-10-21 NOTE — PHYSICAL EXAM
[General Appearance - Alert] : alert [General Appearance - In No Acute Distress] : in no acute distress [General Appearance - Well Nourished] : well nourished [Sclera] : the sclera and conjunctiva were normal [PERRL With Normal Accommodation] : pupils were equal in size, round, and reactive to light [Extraocular Movements] : extraocular movements were intact [Oropharynx] : the oropharynx was normal [Neck Appearance] : the appearance of the neck was normal [Auscultation Breath Sounds / Voice Sounds] : lungs were clear to auscultation bilaterally [Heart Rate And Rhythm] : heart rate was normal and rhythm regular [Heart Sounds] : normal S1 and S2 [Murmurs] : no murmurs [Edema] : there was no peripheral edema [Bowel Sounds] : normal bowel sounds [Abdomen Soft] : soft [Abdomen Tenderness] : non-tender [Abdomen Mass (___ Cm)] : no abdominal mass palpated [Cervical Lymph Nodes Enlarged Posterior Bilaterally] : posterior cervical [Cervical Lymph Nodes Enlarged Anterior Bilaterally] : anterior cervical [Supraclavicular Lymph Nodes Enlarged Bilaterally] : supraclavicular [No CVA Tenderness] : no ~M costovertebral angle tenderness [No Spinal Tenderness] : no spinal tenderness [Abnormal Walk] : normal gait [Nail Clubbing] : no clubbing  or cyanosis of the fingernails [Musculoskeletal - Swelling] : no joint swelling seen [Motor Tone] : muscle strength and tone were normal [FreeTextEntry1] : No synovitis, OA changes in b/l knees  [Skin Color & Pigmentation] : normal skin color and pigmentation [] : no rash [Motor Exam] : the motor exam was normal [No Focal Deficits] : no focal deficits [Oriented To Time, Place, And Person] : oriented to person, place, and time [Impaired Insight] : insight and judgment were intact [Affect] : the affect was normal

## 2020-10-21 NOTE — ASSESSMENT
[FreeTextEntry1] : JARRETT BURROUGHS is a 40 year old man who presents with + GLADYS, mild proteinuria and intermittent microscopic hematuria, and + ESR. Paucity of other CTD, SLE, Scleroderma sx at present. + FH of similar. Unclear etiology at present but does warrant further w/u. \par \par -  reviewed labs in detail with patient, all questions answered \par - Discussed that GLADYS can be seen in 10-15% of normal population, + Ab incidence increases with age and with presence of other family members with hx of autoimmune dz or Ab positivity. Also discussed that GLADYS alone does not confer a diagnosis and that presently he does not meet criteria for SLE. \par - check serologies after good hydration x 1 week to re-assess urine as well \par - RTC in 1 month to review labs and surveil if any new sx

## 2020-10-21 NOTE — HISTORY OF PRESENT ILLNESS
[FreeTextEntry1] : JARRETT BURROUGHS is a 40 year old man who presents with + GLADYS and mild proteinuria seen on UA since 2015, and + father with GLADYS/proteinuria as well, here for rheumatologic eval. \par \par SLE ROS negative for alopecia, sicca, salivary gland swelling, oral ulcers, malar rash, photosensitivity, SOB, chest pain, serositis, abd pain, dysuria, hematuria, rash, joint AM stiffness/synovitis, hematologic abnormalities, Raynauds, thrombotic events. \par \par Scleroderma/CREST ROS negative for telangiectasias, sclerodactyly, tight skin, GERD/GI dysmotility. \par \par Inflammatory arthritis ROS negative for symmetrical peripheral joint synovitis, prolonged AM stiffness, enthesitis, dactylitis, psoriasis/ rashes, eye inflammation, inflammatory low back pain, IBD. \par \par Pt denies other medical issues - DM2, HTN, FH of renal dz \par \par Labs - GLADYS 1:320 speckled, ESR 60-80s, thyroid Ab +, low proteinuria and fluctuating microscopic hematuria. Elevated AlkP.\par Negative - Hep B/C, TB, Smooth muscle Ab, mitochondrial Ab

## 2020-11-12 LAB
APPEARANCE: CLEAR
BACTERIA: NEGATIVE
BILIRUBIN URINE: NEGATIVE
BLOOD URINE: ABNORMAL
C3 SERPL-MCNC: 132 MG/DL
C4 SERPL-MCNC: 38 MG/DL
COLOR: YELLOW
CREAT SPEC-SCNC: 229 MG/DL
CREAT/PROT UR: 0.1 RATIO
CRP SERPL-MCNC: 1.47 MG/DL
GLUCOSE QUALITATIVE U: NEGATIVE
HYALINE CASTS: 0 /LPF
KETONES URINE: NEGATIVE
LEUKOCYTE ESTERASE URINE: NEGATIVE
MICROSCOPIC-UA: NORMAL
NITRITE URINE: NEGATIVE
PH URINE: 6
PROT UR-MCNC: 24 MG/DL
PROTEIN URINE: ABNORMAL
RED BLOOD CELLS URINE: 1 /HPF
SPECIFIC GRAVITY URINE: 1.04
SQUAMOUS EPITHELIAL CELLS: 1 /HPF
UROBILINOGEN URINE: NORMAL
WHITE BLOOD CELLS URINE: 1 /HPF

## 2020-11-13 LAB
CENTROMERE IGG SER-ACNC: <0.2 CD:130001892
DSDNA AB SER-ACNC: <12 IU/ML
ENA RNP AB SER IA-ACNC: 0.5 AL
ENA SCL70 IGG SER IA-ACNC: <0.2 AL
ENA SM AB SER IA-ACNC: <0.2 AL
ENA SS-A AB SER IA-ACNC: <0.2 AL
ENA SS-B AB SER IA-ACNC: <0.2 AL
ERYTHROCYTE [SEDIMENTATION RATE] IN BLOOD BY WESTERGREN METHOD: 60 MM/HR

## 2020-11-16 LAB
ANA PAT FLD IF-IMP: ABNORMAL
ANA SER IF-ACNC: ABNORMAL

## 2020-11-17 ENCOUNTER — APPOINTMENT (OUTPATIENT)
Dept: RHEUMATOLOGY | Facility: CLINIC | Age: 40
End: 2020-11-17
Payer: COMMERCIAL

## 2020-11-17 VITALS
OXYGEN SATURATION: 98 % | RESPIRATION RATE: 14 BRPM | TEMPERATURE: 97.7 F | HEART RATE: 66 BPM | BODY MASS INDEX: 35.7 KG/M2 | SYSTOLIC BLOOD PRESSURE: 116 MMHG | DIASTOLIC BLOOD PRESSURE: 70 MMHG | WEIGHT: 194 LBS | HEIGHT: 62 IN

## 2020-11-17 DIAGNOSIS — R70.0 ELEVATED ERYTHROCYTE SEDIMENTATION RATE: ICD-10-CM

## 2020-11-17 DIAGNOSIS — R80.0 ISOLATED PROTEINURIA: ICD-10-CM

## 2020-11-17 PROCEDURE — 99214 OFFICE O/P EST MOD 30 MIN: CPT | Mod: 25

## 2020-11-17 NOTE — ASSESSMENT
[FreeTextEntry1] : JARRETT BURROUGHS is a 40 year old man with + GLADYS, mild proteinuria and intermittent microscopic hematuria, and + ESR. Paucity of other CTD, SLE, Scleroderma sx at present. + FH of similar. More specific serological w/u negative, proteinuria within normal limits and small blood on UA but otherwise no active sediment. \par \par - reviewed labs in detail with patient, all questions answered -- low suspicion for connective tissue disorder including SLE given history, exam, and serological results \par - Discussed that trace proteinuria/hematuria is likely benign but given GLADYS warrants routine surveillance, if microscopic hematuria persisting, might warrant urology evaluation\par - ensure adequate hydration and monitor for any worsening urinary sx \par - RTC in 6 months to surveil, sooner if any new sx

## 2020-11-17 NOTE — PHYSICAL EXAM
[General Appearance - Alert] : alert [General Appearance - In No Acute Distress] : in no acute distress [General Appearance - Well Nourished] : well nourished [Sclera] : the sclera and conjunctiva were normal [PERRL With Normal Accommodation] : pupils were equal in size, round, and reactive to light [Extraocular Movements] : extraocular movements were intact [Oropharynx] : the oropharynx was normal [Neck Appearance] : the appearance of the neck was normal [Auscultation Breath Sounds / Voice Sounds] : lungs were clear to auscultation bilaterally [Heart Rate And Rhythm] : heart rate was normal and rhythm regular [Heart Sounds] : normal S1 and S2 [Murmurs] : no murmurs [Edema] : there was no peripheral edema [Bowel Sounds] : normal bowel sounds [Abdomen Soft] : soft [Abdomen Tenderness] : non-tender [Abdomen Mass (___ Cm)] : no abdominal mass palpated [Cervical Lymph Nodes Enlarged Posterior Bilaterally] : posterior cervical [Cervical Lymph Nodes Enlarged Anterior Bilaterally] : anterior cervical [Supraclavicular Lymph Nodes Enlarged Bilaterally] : supraclavicular [No CVA Tenderness] : no ~M costovertebral angle tenderness [No Spinal Tenderness] : no spinal tenderness [Abnormal Walk] : normal gait [Nail Clubbing] : no clubbing  or cyanosis of the fingernails [Musculoskeletal - Swelling] : no joint swelling seen [Motor Tone] : muscle strength and tone were normal [Skin Color & Pigmentation] : normal skin color and pigmentation [] : no rash [Motor Exam] : the motor exam was normal [No Focal Deficits] : no focal deficits [Oriented To Time, Place, And Person] : oriented to person, place, and time [Impaired Insight] : insight and judgment were intact [Affect] : the affect was normal [FreeTextEntry1] : No synovitis, OA changes in b/l knees

## 2020-11-17 NOTE — HISTORY OF PRESENT ILLNESS
[FreeTextEntry1] : JARRETT BURROUGHS is a 40 year old man who presents with + GLADYS and mild proteinuria seen on UA since 2015, and + father with GLADYS/proteinuria as well, here for rheumatologic eval. \par \par SLE ROS negative for alopecia, sicca, salivary gland swelling, oral ulcers, malar rash, photosensitivity, SOB, chest pain, serositis, abd pain, dysuria, hematuria, rash, joint AM stiffness/synovitis, hematologic abnormalities, Raynauds, thrombotic events. \par \par Scleroderma/CREST ROS negative for telangiectasias, sclerodactyly, tight skin, GERD/GI dysmotility. \par \par Inflammatory arthritis ROS negative for symmetrical peripheral joint synovitis, prolonged AM stiffness, enthesitis, dactylitis, psoriasis/ rashes, eye inflammation, inflammatory low back pain, IBD. \par \par Pt denies other medical issues - DM2, HTN, FH of renal dz \par \par Labs - GLADYS 1:320 speckled, ESR 60-80s, thyroid Ab +, low proteinuria and fluctuating microscopic hematuria. Elevated AlkP.\par Negative - Hep B/C, TB, Smooth muscle Ab, mitochondrial Ab \par -----------\par \par 11/17/20 -- Since last visit, feels well, CTD ROS remains negative. Occasional foamy urine but no blood or other abnormalities. \par \par Present w/u -- + ESR/CRP, + scant UA activity with normal Pr/Cr ratio \par Negative - SLE specifics, scl-70, centromere

## 2020-11-17 NOTE — CONSULT LETTER
[Dear  ___] : Dear  [unfilled], [Consult Letter:] : I had the pleasure of evaluating your patient, [unfilled]. [Please see my note below.] : Please see my note below. [Consult Closing:] : Thank you very much for allowing me to participate in the care of this patient.  If you have any questions, please do not hesitate to contact me. [Sincerely,] : Sincerely, [FreeTextEntry2] : Pérez Stringer  [FreeTextEntry3] : Zenaida Tran MD\par Rheumatology\par [facility] \par \par [address] [place] [address]\par P: [phone]\par F: [phone]\par

## 2020-11-18 LAB — G6PD SER-CCNC: 13.1 U/G HGB

## 2021-01-14 ENCOUNTER — APPOINTMENT (OUTPATIENT)
Dept: ORTHOPEDIC SURGERY | Facility: CLINIC | Age: 41
End: 2021-01-14
Payer: COMMERCIAL

## 2021-01-14 VITALS
HEART RATE: 76 BPM | SYSTOLIC BLOOD PRESSURE: 150 MMHG | BODY MASS INDEX: 36.25 KG/M2 | DIASTOLIC BLOOD PRESSURE: 87 MMHG | HEIGHT: 62 IN | WEIGHT: 197 LBS

## 2021-01-14 PROCEDURE — 99204 OFFICE O/P NEW MOD 45 MIN: CPT | Mod: 25

## 2021-01-14 PROCEDURE — 73130 X-RAY EXAM OF HAND: CPT | Mod: RT

## 2021-01-14 PROCEDURE — 29075 APPL CST ELBW FNGR SHORT ARM: CPT | Mod: RT

## 2021-01-14 PROCEDURE — 99072 ADDL SUPL MATRL&STAF TM PHE: CPT

## 2021-01-22 ENCOUNTER — APPOINTMENT (OUTPATIENT)
Dept: ORTHOPEDIC SURGERY | Facility: CLINIC | Age: 41
End: 2021-01-22
Payer: COMMERCIAL

## 2021-01-22 VITALS — BODY MASS INDEX: 36.07 KG/M2 | WEIGHT: 196 LBS | HEIGHT: 62 IN

## 2021-01-22 PROCEDURE — 99072 ADDL SUPL MATRL&STAF TM PHE: CPT

## 2021-01-22 PROCEDURE — 99214 OFFICE O/P EST MOD 30 MIN: CPT | Mod: 25

## 2021-01-22 PROCEDURE — 73130 X-RAY EXAM OF HAND: CPT | Mod: RT

## 2021-01-22 PROCEDURE — 29075 APPL CST ELBW FNGR SHORT ARM: CPT | Mod: RT

## 2021-01-22 RX ORDER — MELOXICAM 15 MG/1
15 TABLET ORAL
Qty: 30 | Refills: 2 | Status: ACTIVE | COMMUNITY
Start: 2021-01-22 | End: 1900-01-01

## 2021-02-08 ENCOUNTER — APPOINTMENT (OUTPATIENT)
Dept: ORTHOPEDIC SURGERY | Facility: CLINIC | Age: 41
End: 2021-02-08
Payer: COMMERCIAL

## 2021-02-08 PROCEDURE — 99214 OFFICE O/P EST MOD 30 MIN: CPT

## 2021-02-08 PROCEDURE — 73130 X-RAY EXAM OF HAND: CPT | Mod: RT

## 2021-02-08 PROCEDURE — 99072 ADDL SUPL MATRL&STAF TM PHE: CPT

## 2021-02-08 RX ORDER — MELOXICAM 15 MG/1
15 TABLET ORAL
Qty: 30 | Refills: 2 | Status: ACTIVE | COMMUNITY
Start: 2021-02-08 | End: 1900-01-01

## 2021-02-19 ENCOUNTER — APPOINTMENT (OUTPATIENT)
Dept: ORTHOPEDIC SURGERY | Facility: CLINIC | Age: 41
End: 2021-02-19
Payer: COMMERCIAL

## 2021-02-19 VITALS
DIASTOLIC BLOOD PRESSURE: 85 MMHG | HEIGHT: 62 IN | HEART RATE: 87 BPM | WEIGHT: 198 LBS | SYSTOLIC BLOOD PRESSURE: 155 MMHG | BODY MASS INDEX: 36.44 KG/M2

## 2021-02-19 PROCEDURE — 99072 ADDL SUPL MATRL&STAF TM PHE: CPT

## 2021-02-19 PROCEDURE — 73130 X-RAY EXAM OF HAND: CPT | Mod: RT

## 2021-02-19 PROCEDURE — 99214 OFFICE O/P EST MOD 30 MIN: CPT

## 2021-03-19 ENCOUNTER — APPOINTMENT (OUTPATIENT)
Dept: ORTHOPEDIC SURGERY | Facility: CLINIC | Age: 41
End: 2021-03-19
Payer: COMMERCIAL

## 2021-03-19 DIAGNOSIS — S62.326D DISPLACED FRACTURE OF SHAFT OF FIFTH METACARPAL BONE, RIGHT HAND, SUBSEQUENT ENCOUNTER FOR FRACTURE WITH ROUTINE HEALING: ICD-10-CM

## 2021-03-19 PROCEDURE — 99072 ADDL SUPL MATRL&STAF TM PHE: CPT

## 2021-03-19 PROCEDURE — 99213 OFFICE O/P EST LOW 20 MIN: CPT

## 2021-03-19 PROCEDURE — 73130 X-RAY EXAM OF HAND: CPT | Mod: RT

## 2021-05-18 ENCOUNTER — APPOINTMENT (OUTPATIENT)
Dept: RHEUMATOLOGY | Facility: CLINIC | Age: 41
End: 2021-05-18

## 2022-01-15 ENCOUNTER — APPOINTMENT (OUTPATIENT)
Dept: RADIOLOGY | Facility: HOSPITAL | Age: 42
End: 2022-01-15
Payer: COMMERCIAL

## 2022-01-15 ENCOUNTER — OUTPATIENT (OUTPATIENT)
Dept: OUTPATIENT SERVICES | Facility: HOSPITAL | Age: 42
LOS: 1 days | End: 2022-01-15
Payer: COMMERCIAL

## 2022-01-15 DIAGNOSIS — Z90.49 ACQUIRED ABSENCE OF OTHER SPECIFIED PARTS OF DIGESTIVE TRACT: Chronic | ICD-10-CM

## 2022-01-15 DIAGNOSIS — Z00.8 ENCOUNTER FOR OTHER GENERAL EXAMINATION: ICD-10-CM

## 2022-01-15 DIAGNOSIS — Z98.890 OTHER SPECIFIED POSTPROCEDURAL STATES: Chronic | ICD-10-CM

## 2022-01-15 PROCEDURE — 71046 X-RAY EXAM CHEST 2 VIEWS: CPT

## 2022-01-15 PROCEDURE — 71046 X-RAY EXAM CHEST 2 VIEWS: CPT | Mod: 26

## 2022-03-17 ENCOUNTER — APPOINTMENT (OUTPATIENT)
Dept: CARDIOLOGY | Facility: CLINIC | Age: 42
End: 2022-03-17
Payer: COMMERCIAL

## 2022-03-17 ENCOUNTER — NON-APPOINTMENT (OUTPATIENT)
Age: 42
End: 2022-03-17

## 2022-03-17 ENCOUNTER — APPOINTMENT (OUTPATIENT)
Dept: CARDIOLOGY | Facility: CLINIC | Age: 42
End: 2022-03-17

## 2022-03-17 VITALS
RESPIRATION RATE: 20 BRPM | HEIGHT: 62 IN | OXYGEN SATURATION: 98 % | TEMPERATURE: 97.8 F | DIASTOLIC BLOOD PRESSURE: 77 MMHG | SYSTOLIC BLOOD PRESSURE: 135 MMHG | BODY MASS INDEX: 39.75 KG/M2 | WEIGHT: 216 LBS | HEART RATE: 72 BPM

## 2022-03-17 DIAGNOSIS — R07.9 CHEST PAIN, UNSPECIFIED: ICD-10-CM

## 2022-03-17 DIAGNOSIS — R00.2 PALPITATIONS: ICD-10-CM

## 2022-03-17 PROCEDURE — 93000 ELECTROCARDIOGRAM COMPLETE: CPT | Mod: 59

## 2022-03-17 PROCEDURE — 99214 OFFICE O/P EST MOD 30 MIN: CPT | Mod: 25

## 2022-03-17 PROCEDURE — 93015 CV STRESS TEST SUPVJ I&R: CPT

## 2022-03-22 NOTE — PHYSICAL EXAM
[Well Developed] : well developed [Well Nourished] : well nourished [No Acute Distress] : no acute distress [Normal Conjunctiva] : normal conjunctiva [Normal Venous Pressure] : normal venous pressure [No Carotid Bruit] : no carotid bruit [No Murmur] : no murmur [Normal S1, S2] : normal S1, S2 [No Rub] : no rub [No Gallop] : no gallop [Clear Lung Fields] : clear lung fields [Good Air Entry] : good air entry [No Respiratory Distress] : no respiratory distress  [Soft] : abdomen soft [Non Tender] : non-tender [No Masses/organomegaly] : no masses/organomegaly [Normal Bowel Sounds] : normal bowel sounds [Normal Gait] : normal gait [No Edema] : no edema [No Cyanosis] : no cyanosis [No Clubbing] : no clubbing [No Varicosities] : no varicosities [No Rash] : no rash [No Skin Lesions] : no skin lesions [Moves all extremities] : moves all extremities [No Focal Deficits] : no focal deficits [Normal Speech] : normal speech [Alert and Oriented] : alert and oriented [Normal memory] : normal memory [de-identified] : gynecomastia

## 2022-03-22 NOTE — REASON FOR VISIT
[Symptom and Test Evaluation] : symptom and test evaluation [Coronary Artery Disease] : coronary artery disease [Family Member] : family member [Other: _____] : [unfilled] [FreeTextEntry3] : Dr Stringer [FreeTextEntry1] : Matt has noted some epigastric/chest pains lasting minutes. He had had several episodes over the past few weeks. he notified Dr Stringer who referred him today fo cardiac evaluation. he comes today for clarification of  is    overall cardiovascular risk.he was advised to undergo a complete cardiac evaluation.\par he denies chest pains shortness of breath or loss of consciousnes. The quality of the pain is localized to the anterior chest. The severity is mild to moderate. The  duration and  the timing  is short lived without modifying factors or associated signs and symptoms .\par \par

## 2022-03-22 NOTE — REVIEW OF SYSTEMS
[Palpitations] : palpitations [Dyspnea on exertion] : dyspnea during exertion [Negative] : Heme/Lymph [FreeTextEntry5] : see HPI

## 2022-03-22 NOTE — CARDIOLOGY SUMMARY
[de-identified] : 3/17/22 normal [de-identified] : 3/17/22 normal nine minutes of exercise [de-identified] : 3/17/22 normal

## 2022-03-22 NOTE — DISCUSSION/SUMMARY
[FreeTextEntry1] : I have asked  Matt to undergo detailed cardiac testing in order to evaluate her overall cardiovascular risk. An assessment of both structural and functional heart disease was recommended to the patient.\par In this regard, an echocardiogram and a stress test were advised to the patient. the noninvasive cardiac testing has returned satisfactory however a halter and lipid profile is pending. We discussed the pros and cons of plain treadmill stress testing nuclear stress testing and angiography including a sensitivity analysis. We discussed current ACC guidelines and the calculated 10 year risk is approximately 4% which is low based upon a previous fasting lipid. More than half of the face to face encounter of 60 minutes   was spent in counseling the patient with respect to cardiovascular risk  \par \par Quality measures \par Tobacco intervention not indicated\par Statin for prevention of cardiovascular disease not indicated\par Hypertension compensated\par Aspirin for ischemic vascular disease not indicated\par Tobacco screening cessation and intervention not indicated\par \par Medical necessity\par This is a high encounter based upon two or more chronic illnesses with mild exacerbation requiring further management and evaluation.   .\par \par EKG is indicated for evaluation of\par \par this patient has a high risk for major adverese cardiac events. \par risks benefits alternatives were discussed with the patient.\par all questions were answered to their satisfaction.\par

## 2022-04-26 NOTE — ASU PREOP CHECKLIST - NOTHING BY MOUTH SINCE
Patient assist to bed pan. Urinated without difficulty.       Abhijeet Tafoya RN  04/26/22 [id number] 02-Dec-2018 21:00

## 2022-05-12 ENCOUNTER — APPOINTMENT (OUTPATIENT)
Dept: CARDIOLOGY | Facility: CLINIC | Age: 42
End: 2022-05-12

## 2022-07-12 ENCOUNTER — APPOINTMENT (OUTPATIENT)
Dept: CARDIOLOGY | Facility: CLINIC | Age: 42
End: 2022-07-12

## 2022-09-07 NOTE — H&P ADULT - HISTORY OF PRESENT ILLNESS
Final Anesthesia Post-op Assessment    Patient: Gillian Jean Baptiste  Procedure(s) Performed: ESOPHAGOGASTRODUODENOSCOPY WITH MACCOLONOSCOPY  Anesthesia type: MAC    Vitals Value Taken Time   Temp 36.3 °C (97.3 °F) 09/07/22 1315   Pulse 54 09/07/22 1345   Resp 23 09/07/22 1345   SpO2 96 % 09/07/22 1345   /62 09/07/22 1345         Patient Location: Phase II  Post-op Vital Signs:stable  Level of Consciousness: participates in exam  Respiratory Status: spontaneous ventilation  Cardiovascular blood pressure returned to baseline  Hydration: euvolemic  Pain Management: well controlled  Handoff: Handoff to receiving clinician was performed and questions were answered  Vomiting: none  Nausea: None  Airway Patency:patent  Post-op Assessment: awake, alert, appropriately conversant, or baseline, no complications, patient tolerated procedure well with no complications, no evidence of recall, dentition within defined limits, moving all extremities and No Corneal Abrasion      No complications documented.    38M w/ PMH of CHRISTINE, chronic cholecystitis/cholelithiasis s/p laparascopic cholecystostomy tube placement by Dr. Pritchett in August 2018 (Malencot drain placed in gallbladder and REMI drain left in liver/sidewall interface) and now s/p laparoscopic cholecystectomy 12/3/18 w/ Dr. Pritchett at [facility]. He tolerated procedure well and was sent home same day, however returned to [place] ED later that night with fevers, chills, and worsening abdominal pain. He was tachycardic, tachypneic, had a low grade temperature (~100F) with abdominal tenderness. He was started on IVF and zosyn. CT A/P was suspicious for bile leak. Prior to CT scan, a rapid response was called for tachypnea and chest pain (troponin level <0.17). Vitals were stable during rapid response (SBP in 170s - elevated due to pain, saturating in high 90s on room air, tachycardic to 110s - due to pain, and afebrile). [facility].    On admission to [facility], patient is complaining of abdominal pain worst in RUQ and right shoulder pain with deep breathing, fevers and chills, and constipation. His last oral intake was contrast for the CT scan performed at 10 am this morning. He denies chest pain. He has had intermittent nausea, no vomiting. He has not passed a bowel movement in the last two days.

## 2022-09-20 NOTE — ED PROVIDER NOTE - TOBACCO USE
Never smoker Double O-Z Plasty Text: The defect edges were debeveled with a #15 scalpel blade.  Given the location of the defect, shape of the defect and the proximity to free margins a Double O-Z plasty (double transposition flap) was deemed most appropriate.  Using a sterile surgical marker, the appropriate transposition flaps were drawn incorporating the defect and placing the expected incisions within the relaxed skin tension lines where possible. The area thus outlined was incised deep to adipose tissue with a #15 scalpel blade.  The skin margins were undermined to an appropriate distance in all directions utilizing iris scissors.  Hemostasis was achieved with electrocautery.  The flaps were then transposed into place, one clockwise and the other counterclockwise, and anchored with interrupted buried subcutaneous sutures.

## 2022-11-24 ENCOUNTER — EMERGENCY (EMERGENCY)
Facility: HOSPITAL | Age: 42
LOS: 1 days | Discharge: HOME | End: 2022-11-24
Attending: INTERNAL MEDICINE | Admitting: INTERNAL MEDICINE
Payer: COMMERCIAL

## 2022-11-24 VITALS
RESPIRATION RATE: 16 BRPM | DIASTOLIC BLOOD PRESSURE: 78 MMHG | OXYGEN SATURATION: 100 % | HEART RATE: 73 BPM | SYSTOLIC BLOOD PRESSURE: 120 MMHG

## 2022-11-24 VITALS
WEIGHT: 229.94 LBS | SYSTOLIC BLOOD PRESSURE: 138 MMHG | HEART RATE: 73 BPM | RESPIRATION RATE: 16 BRPM | HEIGHT: 65 IN | DIASTOLIC BLOOD PRESSURE: 90 MMHG | OXYGEN SATURATION: 95 % | TEMPERATURE: 96 F

## 2022-11-24 DIAGNOSIS — Z98.890 OTHER SPECIFIED POSTPROCEDURAL STATES: Chronic | ICD-10-CM

## 2022-11-24 DIAGNOSIS — Z90.49 ACQUIRED ABSENCE OF OTHER SPECIFIED PARTS OF DIGESTIVE TRACT: Chronic | ICD-10-CM

## 2022-11-24 LAB
ALBUMIN SERPL ELPH-MCNC: 3.7 G/DL — SIGNIFICANT CHANGE UP (ref 3.3–5)
ALP SERPL-CCNC: 114 U/L — SIGNIFICANT CHANGE UP (ref 40–120)
ALT FLD-CCNC: 25 U/L — SIGNIFICANT CHANGE UP (ref 10–45)
ANION GAP SERPL CALC-SCNC: 9 MMOL/L — SIGNIFICANT CHANGE UP (ref 5–17)
AST SERPL-CCNC: 21 U/L — SIGNIFICANT CHANGE UP (ref 10–40)
BASOPHILS # BLD AUTO: 0.03 K/UL — SIGNIFICANT CHANGE UP (ref 0–0.2)
BASOPHILS NFR BLD AUTO: 0.4 % — SIGNIFICANT CHANGE UP (ref 0–2)
BILIRUB SERPL-MCNC: 0.4 MG/DL — SIGNIFICANT CHANGE UP (ref 0.2–1.2)
BUN SERPL-MCNC: 24 MG/DL — HIGH (ref 7–23)
CALCIUM SERPL-MCNC: 8.8 MG/DL — SIGNIFICANT CHANGE UP (ref 8.4–10.5)
CHLORIDE SERPL-SCNC: 103 MMOL/L — SIGNIFICANT CHANGE UP (ref 96–108)
CO2 SERPL-SCNC: 25 MMOL/L — SIGNIFICANT CHANGE UP (ref 22–31)
CREAT SERPL-MCNC: 0.68 MG/DL — SIGNIFICANT CHANGE UP (ref 0.5–1.3)
EGFR: 118 ML/MIN/1.73M2 — SIGNIFICANT CHANGE UP
EOSINOPHIL # BLD AUTO: 0.11 K/UL — SIGNIFICANT CHANGE UP (ref 0–0.5)
EOSINOPHIL NFR BLD AUTO: 1.3 % — SIGNIFICANT CHANGE UP (ref 0–6)
GLUCOSE SERPL-MCNC: 103 MG/DL — HIGH (ref 70–99)
HCT VFR BLD CALC: 40.8 % — SIGNIFICANT CHANGE UP (ref 39–50)
HGB BLD-MCNC: 13.1 G/DL — SIGNIFICANT CHANGE UP (ref 13–17)
IMM GRANULOCYTES NFR BLD AUTO: 0.2 % — SIGNIFICANT CHANGE UP (ref 0–0.9)
LYMPHOCYTES # BLD AUTO: 1.61 K/UL — SIGNIFICANT CHANGE UP (ref 1–3.3)
LYMPHOCYTES # BLD AUTO: 19.4 % — SIGNIFICANT CHANGE UP (ref 13–44)
MCHC RBC-ENTMCNC: 26.8 PG — LOW (ref 27–34)
MCHC RBC-ENTMCNC: 32.1 GM/DL — SIGNIFICANT CHANGE UP (ref 32–36)
MCV RBC AUTO: 83.6 FL — SIGNIFICANT CHANGE UP (ref 80–100)
MONOCYTES # BLD AUTO: 0.43 K/UL — SIGNIFICANT CHANGE UP (ref 0–0.9)
MONOCYTES NFR BLD AUTO: 5.2 % — SIGNIFICANT CHANGE UP (ref 2–14)
NEUTROPHILS # BLD AUTO: 6.09 K/UL — SIGNIFICANT CHANGE UP (ref 1.8–7.4)
NEUTROPHILS NFR BLD AUTO: 73.5 % — SIGNIFICANT CHANGE UP (ref 43–77)
NRBC # BLD: 0 /100 WBCS — SIGNIFICANT CHANGE UP (ref 0–0)
PLATELET # BLD AUTO: 298 K/UL — SIGNIFICANT CHANGE UP (ref 150–400)
POTASSIUM SERPL-MCNC: 4 MMOL/L — SIGNIFICANT CHANGE UP (ref 3.5–5.3)
POTASSIUM SERPL-SCNC: 4 MMOL/L — SIGNIFICANT CHANGE UP (ref 3.5–5.3)
PROT SERPL-MCNC: 8 G/DL — SIGNIFICANT CHANGE UP (ref 6–8.3)
RBC # BLD: 4.88 M/UL — SIGNIFICANT CHANGE UP (ref 4.2–5.8)
RBC # FLD: 14.4 % — SIGNIFICANT CHANGE UP (ref 10.3–14.5)
SODIUM SERPL-SCNC: 137 MMOL/L — SIGNIFICANT CHANGE UP (ref 135–145)
WBC # BLD: 8.29 K/UL — SIGNIFICANT CHANGE UP (ref 3.8–10.5)
WBC # FLD AUTO: 8.29 K/UL — SIGNIFICANT CHANGE UP (ref 3.8–10.5)

## 2022-11-24 PROCEDURE — 99283 EMERGENCY DEPT VISIT LOW MDM: CPT | Mod: 25

## 2022-11-24 PROCEDURE — 36415 COLL VENOUS BLD VENIPUNCTURE: CPT

## 2022-11-24 PROCEDURE — 93005 ELECTROCARDIOGRAM TRACING: CPT

## 2022-11-24 PROCEDURE — 85025 COMPLETE CBC W/AUTO DIFF WBC: CPT

## 2022-11-24 PROCEDURE — 93010 ELECTROCARDIOGRAM REPORT: CPT

## 2022-11-24 PROCEDURE — 99284 EMERGENCY DEPT VISIT MOD MDM: CPT

## 2022-11-24 PROCEDURE — 80053 COMPREHEN METABOLIC PANEL: CPT

## 2022-11-24 PROCEDURE — 96360 HYDRATION IV INFUSION INIT: CPT

## 2022-11-24 RX ORDER — MECLIZINE HCL 12.5 MG
50 TABLET ORAL ONCE
Refills: 0 | Status: COMPLETED | OUTPATIENT
Start: 2022-11-24 | End: 2022-11-24

## 2022-11-24 RX ORDER — SODIUM CHLORIDE 9 MG/ML
1000 INJECTION INTRAMUSCULAR; INTRAVENOUS; SUBCUTANEOUS ONCE
Refills: 0 | Status: COMPLETED | OUTPATIENT
Start: 2022-11-24 | End: 2022-11-24

## 2022-11-24 RX ORDER — MECLIZINE HCL 12.5 MG
1 TABLET ORAL
Qty: 21 | Refills: 0
Start: 2022-11-24 | End: 2022-11-30

## 2022-11-24 RX ADMIN — SODIUM CHLORIDE 1000 MILLILITER(S): 9 INJECTION INTRAMUSCULAR; INTRAVENOUS; SUBCUTANEOUS at 16:12

## 2022-11-24 RX ADMIN — Medication 50 MILLIGRAM(S): at 16:45

## 2022-11-24 RX ADMIN — SODIUM CHLORIDE 1000 MILLILITER(S): 9 INJECTION INTRAMUSCULAR; INTRAVENOUS; SUBCUTANEOUS at 17:12

## 2022-11-24 NOTE — ED PROVIDER NOTE - CLINICAL SUMMARY MEDICAL DECISION MAKING FREE TEXT BOX
42 y m cc dizziness worse with head in certain positions no nausea  + halpike sign, labs nl improved with iv fluids, and meclizine

## 2022-11-24 NOTE — ED ADULT NURSE NOTE - NSICDXFAMILYHX_GEN_ALL_CORE_FT
FAMILY HISTORY:  Family history of cholelithiasis  Family history of diabetes mellitus  Family history of Parkinson disease  Family history of peripheral vascular disease  Family history of thyroid disease    Sibling  Still living? Yes, Estimated age: 31-40  Family history of kidney stone, Age at diagnosis: Age Unknown

## 2022-11-24 NOTE — ED ADULT NURSE NOTE - NSICDXPASTMEDICALHX_GEN_ALL_CORE_FT
PAST MEDICAL HISTORY:  Calculus of gallbladder with cholecystitis without biliary obstruction, unspecified cholecystitis acuity     GERD (gastroesophageal reflux disease) no medications    CHRISTINE (obstructive sleep apnea) no sleep studies done but had witnessed apnea by his brother    Prediabetes     Scoliosis

## 2022-11-24 NOTE — ED PROVIDER NOTE - PHYSICAL EXAMINATION
General:     NAD, well-nourished, well-appearing  Head:     NC/AT, EOMI, oral mucosa moist  Neck:     trachea midline  Lungs:     CTA b/l, no w/r/r  CVS:     S1S2, RRR, no m/g/r  Abd:     +BS, s/nt/nd, no organomegaly  Ext:    2+ radial and pedal pulses, no c/c/e  Neuro: AAOx3, no sensory/motor deficits  + Dicks halpikes test , coordination and gait intact

## 2022-11-24 NOTE — ED PROVIDER NOTE - PATIENT PORTAL LINK FT
You can access the FollowMyHealth Patient Portal offered by [facility] by registering at the following website: [email or web address]. By joining [facility]’s FollowMyHealth portal, you will also be able to view your health information using other applications (apps) compatible with our system.

## 2022-11-24 NOTE — ED PROVIDER NOTE - NSFOLLOWUPINSTRUCTIONS_ED_ALL_ED_FT
Follow up with your PMD within 1-2 days.  Rest, increase your fluids, advance your activity as tolerated.   Take all of your other medications as previously prescribed.   Worsening, continued or ANY new concerning symptoms return to the emergency department.

## 2022-11-24 NOTE — ED ADULT NURSE NOTE - OBJECTIVE STATEMENT
Patient came from home with complaint of dizziness for the past two days. Patient states to feel dizzy when sitting up and moving around. Denies any blurry vision, chest pain, SOB and syncopal episode. Denies any PMH or taking any daily medication. Patient started taking Centrum.

## 2022-11-24 NOTE — ED ADULT NURSE NOTE - NSICDXPASTSURGICALHX_GEN_ALL_CORE_FT
PAST SURGICAL HISTORY:  H/O abdominal surgery insertion of biliary drain 2018    S/P laparoscopic cholecystectomy 12/03/18

## 2022-12-15 NOTE — PROGRESS NOTE ADULT - ASSESSMENT
Department of Anesthesiology  Postprocedure Note    Patient: Duncan Cody  MRN: [id number]  YOB: 1950  Date of evaluation: 12/15/2022      Procedure Summary     Date: 12/15/22 Room / Location: [facility] Special Procedures    Anesthesia Start: 9160 Anesthesia Stop: 5330    Procedure: Joie Andrea IR W ANESTHESIA Diagnosis:     Scheduled Providers: f Ir Radiologist Responsible Provider: Haylee Campbell MD    Anesthesia Type: general ASA Status: 2          Anesthesia Type: No value filed.     Ruben Phase I:      Ruben Phase II:        Anesthesia Post Evaluation    Patient location during evaluation: PACU  Patient participation: complete - patient participated  Level of consciousness: awake and alert  Airway patency: patent  Nausea & Vomiting: no vomiting and no nausea  Complications: no  Cardiovascular status: hemodynamically stable  Respiratory status: acceptable  Hydration status: stable large biloma most likely    Dr. Otero spoke to IR who accepted patient to do IR drainage today.  I spoke to Dr. Stanley Chen Attending Surgeon who accepted patient to his service at [facility].      I discussed with patient and family.

## 2022-12-21 ENCOUNTER — APPOINTMENT (OUTPATIENT)
Dept: CARDIOLOGY | Facility: CLINIC | Age: 42
End: 2022-12-21

## 2023-01-18 ENCOUNTER — APPOINTMENT (OUTPATIENT)
Dept: CARDIOLOGY | Facility: CLINIC | Age: 43
End: 2023-01-18

## 2023-05-04 NOTE — H&P ADULT - HISTORY OF PRESENT ILLNESS
39 yo male with obesity, chronic cholecystitis and cholelithiasis, had intermittent episodes of right upper quadrant abdominal pain that he was able to tolerate. In August 2018 had a severe episode of abdominal pain and came to the ER at [place] with fever and started on antibiotics. He went to the OR and had a biliary drain inserted but was told the gallbladder was too inflamed and could not be removed at that time. Patient reports that the drain has been "leaky" at the insertion site for the last 2 weeks after eating. 37 yo male with obesity, hx of chronic cholecystitis and cholelithiasis, s/p  had intermittent episodes of right upper quadrant abdominal pain that he was able to tolerate. In August 2018 had a severe episode of abdominal pain and came to the ER at [place] with fever and started on antibiotics. He went to the OR and had a biliary drain inserted but was told the gallbladder was too inflamed and could not be removed at that time. Patient reports that the drain has been "leaky" at the insertion site for the last 2 weeks after eating. 39 yo male with obesity, hx of chronic cholecystitis and cholelithiasis, s/p  lap patrick yesterday adhad intermittent episodes of right upper quadrant abdominal pain that he was able to tolerate. In August 2018 had a severe episode of abdominal pain and came to the ER at [place] with fever and started on antibiotics. He went to the OR and had a biliary drain inserted but was told the gallbladder was too inflamed and could not be removed at that time. Patient reports that the drain has been "leaky" at the insertion site for the last 2 weeks after eating. 39 yo male with obesity, hx of chronic cholecystitis and cholelithiasis, s/p  renan patrick yesterday, was okay until adhad intermittent episodes of right upper quadrant abdominal pain that he was able to tolerate. In August 2018 had a severe episode of abdominal pain and came to the ER at [place] with fever and started on antibiotics. He went to the OR and had a biliary drain inserted 08/13/18 but was told the gallbladder was too inflamed and could not be removed at that time. Patient reports that the drain has been "leaky" at the insertion site for the last 2 weeks after eating. 39 yo male with obesity, hx of chronic cholecystitis and cholelithiasis, s/p  renan patrick yesterday, was okay until this earladhad intermittent episodes of right upper quadrant abdominal pain that he was able to tolerate. In August 2018 had a severe episode of abdominal pain and came to the ER at [place] with fever and started on antibiotics. He went to the OR and had a biliary drain inserted 08/13/18 but was told the gallbladder was too inflamed and could not be removed at that time. Patient reports that the drain has been "leaky" at the insertion site for the last 2 weeks after eating. 37 yo male with obesity, hx of chronic cholecystitis and cholelithiasis, s/p  lap patrick yesterday, was okay until late last night, had severe abdominal pain, took a Percocet without relief.  Pain worsened until this early morning, and pt felt feverish so decided to go to the ED.  Temp was 99.9.  Abd xray was neg.    Pt had hx of chronic cholecystitis, with cholelithiasis x about 3 years, until in 08/12/2018, he had a severe bout of acute cholecystitis, and had enterobacter bacteremia, tx with IV antibx.  He went to the OR on 08/13/18 to have cholecystectomy but gallbladder was too inflamed and adherent so had a cholecystostomy tube placed at that time. 39 yo male with obesity, ?CHRISTINE, hx of chronic cholecystitis and cholelithiasis, s/p  lap patrick yesterday, was okay until late last night, had severe abdominal pain, took a Percocet without relief.  Pain worsened until this early morning, and pt felt feverish so decided to go to the ED.  Temp was 99.9.  Abd xray was neg.    Pt had hx of chronic cholecystitis, with cholelithiasis x about 3 years, until in 08/13/2018, he had a severe bout of acute cholecystitis, and had enterobacter bacteremia, tx with IV antibx.  He went to the OR on 08/13/18 to have cholecystectomy but gallbladder was too inflamed and adherent so had a cholecystostomy tube placed at that time.  Pt had improved thereafter and had lap patrick performed yesterday afternoon.  Pt c/o nausea, vomiting. 37 yo male with obesity, ?CHRISTINE, hx of chronic cholecystitis and cholelithiasis, s/p  lap patrick yesterday, was okay until late last night, had severe abdominal pain, took a Percocet at 9PM, them 1 AM, without relief.  Pain worsened until this early morning, feeling bloated, nauseous, and pt felt feverish so decided to go to the ED.  Temp was 99.9.  Abd xray was neg.    Pt had hx of chronic cholecystitis, with cholelithiasis x about 3 years, until in 08/13/2018, he had a severe bout of acute cholecystitis, and had enterobacter bacteremia, tx with IV antibx.  He went to the OR on 08/13/18 to have cholecystectomy but gallbladder was too inflamed and adherent so had a cholecystostomy tube placed at that time.  Pt had improved thereafter and had lap patrick performed yesterday afternoon. Assistance with ambulation

## 2023-05-18 NOTE — ED ADULT NURSE NOTE - PAIN RATING/NUMBER SCALE (0-10): ACTIVITY
[FreeTextEntry1] : R leg pain [de-identified] : Mr. Leonard is a 70 y/o, male, with R leg pain x 3 months, here to establish care as a new patient. He reports his R leg pain starts in his groin and radiates down thigh to above foot, anteriorly. He rates his pain a 7/10. Denies numbness, weakness of leg, or foot. He has been taking otc medication for pain and undergoing physical therapy for his pain. He also received injection to R hip without relief. Xray with small 11 mm sclerotic focus in R femur. MRI lumbar spine with spondylosis, worse L4-L5, L5-S1 with new severe stenosis R L4-L5. Denies urine incontinence. He limps when he walks. 3

## 2023-12-07 ENCOUNTER — APPOINTMENT (OUTPATIENT)
Dept: RHEUMATOLOGY | Facility: CLINIC | Age: 43
End: 2023-12-07
Payer: COMMERCIAL

## 2023-12-07 ENCOUNTER — APPOINTMENT (OUTPATIENT)
Age: 43
End: 2023-12-07

## 2023-12-07 ENCOUNTER — APPOINTMENT (OUTPATIENT)
Dept: RHEUMATOLOGY | Facility: CLINIC | Age: 43
End: 2023-12-07

## 2023-12-07 VITALS
TEMPERATURE: 97.3 F | WEIGHT: 218 LBS | HEART RATE: 76 BPM | RESPIRATION RATE: 18 BRPM | HEIGHT: 62 IN | BODY MASS INDEX: 40.12 KG/M2 | DIASTOLIC BLOOD PRESSURE: 82 MMHG | SYSTOLIC BLOOD PRESSURE: 130 MMHG | OXYGEN SATURATION: 98 %

## 2023-12-07 DIAGNOSIS — R76.8 OTHER SPECIFIED ABNORMAL IMMUNOLOGICAL FINDINGS IN SERUM: ICD-10-CM

## 2023-12-07 DIAGNOSIS — M54.9 DORSALGIA, UNSPECIFIED: ICD-10-CM

## 2023-12-07 DIAGNOSIS — G47.33 OBSTRUCTIVE SLEEP APNEA (ADULT) (PEDIATRIC): ICD-10-CM

## 2023-12-07 DIAGNOSIS — E55.9 VITAMIN D DEFICIENCY, UNSPECIFIED: ICD-10-CM

## 2023-12-07 DIAGNOSIS — R06.02 SHORTNESS OF BREATH: ICD-10-CM

## 2023-12-07 PROCEDURE — 99204 OFFICE O/P NEW MOD 45 MIN: CPT

## 2023-12-07 RX ORDER — MECLIZINE HYDROCHLORIDE 25 MG/1
25 TABLET ORAL 3 TIMES DAILY
Qty: 30 | Refills: 0 | Status: ACTIVE | COMMUNITY
Start: 2023-12-07 | End: 1900-01-01

## 2023-12-07 RX ORDER — ERGOCALCIFEROL 1.25 MG/1
1.25 MG CAPSULE, LIQUID FILLED ORAL
Qty: 12 | Refills: 0 | Status: ACTIVE | COMMUNITY
Start: 2023-12-07 | End: 1900-01-01

## 2023-12-08 PROBLEM — R76.8 POSITIVE ANA (ANTINUCLEAR ANTIBODY): Status: ACTIVE | Noted: 2020-10-15

## 2023-12-08 PROBLEM — M54.9 MID BACK PAIN: Status: ACTIVE | Noted: 2023-12-07

## 2023-12-14 ENCOUNTER — APPOINTMENT (OUTPATIENT)
Dept: DERMATOLOGY | Facility: CLINIC | Age: 43
End: 2023-12-14

## 2023-12-15 ENCOUNTER — OUTPATIENT (OUTPATIENT)
Dept: OUTPATIENT SERVICES | Facility: HOSPITAL | Age: 43
LOS: 1 days | End: 2023-12-15
Payer: COMMERCIAL

## 2023-12-15 ENCOUNTER — APPOINTMENT (OUTPATIENT)
Dept: RADIOLOGY | Facility: HOSPITAL | Age: 43
End: 2023-12-15
Payer: COMMERCIAL

## 2023-12-15 DIAGNOSIS — R76.8 OTHER SPECIFIED ABNORMAL IMMUNOLOGICAL FINDINGS IN SERUM: ICD-10-CM

## 2023-12-15 DIAGNOSIS — Z90.49 ACQUIRED ABSENCE OF OTHER SPECIFIED PARTS OF DIGESTIVE TRACT: Chronic | ICD-10-CM

## 2023-12-15 DIAGNOSIS — R06.02 SHORTNESS OF BREATH: ICD-10-CM

## 2023-12-15 LAB
ALBUMIN SERPL ELPH-MCNC: 4.4 G/DL
ALP BLD-CCNC: 112 U/L
ALT SERPL-CCNC: 18 U/L
ANION GAP SERPL CALC-SCNC: 9 MMOL/L
AST SERPL-CCNC: 14 U/L
BASOPHILS # BLD AUTO: 0.03 K/UL
BASOPHILS NFR BLD AUTO: 0.4 %
BILIRUB SERPL-MCNC: 0.4 MG/DL
BUN SERPL-MCNC: 26 MG/DL
C3 SERPL-MCNC: 138 MG/DL
C4 SERPL-MCNC: 35 MG/DL
CALCIUM SERPL-MCNC: 10.1 MG/DL
CHLORIDE SERPL-SCNC: 101 MMOL/L
CO2 SERPL-SCNC: 28 MMOL/L
CREAT SERPL-MCNC: 0.65 MG/DL
CRP SERPL-MCNC: 11 MG/L
EGFR: 119 ML/MIN/1.73M2
EOSINOPHIL # BLD AUTO: 0.19 K/UL
EOSINOPHIL NFR BLD AUTO: 2.4 %
GLUCOSE SERPL-MCNC: 101 MG/DL
HCT VFR BLD CALC: 41.2 %
HGB BLD-MCNC: 13.2 G/DL
IMM GRANULOCYTES NFR BLD AUTO: 0.3 %
LYMPHOCYTES # BLD AUTO: 1.47 K/UL
LYMPHOCYTES NFR BLD AUTO: 18.9 %
MAN DIFF?: NORMAL
MCHC RBC-ENTMCNC: 27.5 PG
MCHC RBC-ENTMCNC: 32 GM/DL
MCV RBC AUTO: 85.8 FL
MONOCYTES # BLD AUTO: 0.41 K/UL
MONOCYTES NFR BLD AUTO: 5.3 %
NEUTROPHILS # BLD AUTO: 5.67 K/UL
NEUTROPHILS NFR BLD AUTO: 72.7 %
PLATELET # BLD AUTO: 280 K/UL
POTASSIUM SERPL-SCNC: 4 MMOL/L
PROT SERPL-MCNC: 7.6 G/DL
RBC # BLD: 4.8 M/UL
RBC # FLD: 14.4 %
SODIUM SERPL-SCNC: 138 MMOL/L
WBC # FLD AUTO: 7.79 K/UL

## 2023-12-15 PROCEDURE — 71046 X-RAY EXAM CHEST 2 VIEWS: CPT

## 2023-12-15 PROCEDURE — 71046 X-RAY EXAM CHEST 2 VIEWS: CPT | Mod: 26

## 2023-12-18 LAB
ANA PAT FLD IF-IMP: ABNORMAL
ANA SER IF-ACNC: ABNORMAL
APPEARANCE: CLEAR
BACTERIA: NEGATIVE /HPF
BILIRUBIN URINE: NEGATIVE
BLOOD URINE: NEGATIVE
CAST: 0 /LPF
COLOR: YELLOW
CREAT SPEC-SCNC: 54 MG/DL
CREAT/PROT UR: 0.1 RATIO
ENA RNP AB SER IA-ACNC: 1 AL
ENA SM AB SER IA-ACNC: <0.2 AL
ENA SS-A AB SER IA-ACNC: <0.2 AL
ENA SS-B AB SER IA-ACNC: <0.2 AL
EPITHELIAL CELLS: 0 /HPF
ERYTHROCYTE [SEDIMENTATION RATE] IN BLOOD BY WESTERGREN METHOD: 54 MM/HR
GLUCOSE QUALITATIVE U: NEGATIVE MG/DL
KETONES URINE: NEGATIVE MG/DL
LEUKOCYTE ESTERASE URINE: NEGATIVE
MICROSCOPIC-UA: NORMAL
NITRITE URINE: NEGATIVE
PH URINE: 7
PROT UR-MCNC: 8 MG/DL
PROTEIN URINE: NEGATIVE MG/DL
RED BLOOD CELLS URINE: 1 /HPF
SPECIFIC GRAVITY URINE: 1.02
UROBILINOGEN URINE: 0.2 MG/DL
WHITE BLOOD CELLS URINE: 0 /HPF

## 2023-12-29 ENCOUNTER — APPOINTMENT (OUTPATIENT)
Dept: DERMATOLOGY | Facility: CLINIC | Age: 43
End: 2023-12-29
Payer: COMMERCIAL

## 2023-12-29 DIAGNOSIS — L30.9 DERMATITIS, UNSPECIFIED: ICD-10-CM

## 2023-12-29 DIAGNOSIS — L29.9 PRURITUS, UNSPECIFIED: ICD-10-CM

## 2023-12-29 DIAGNOSIS — L81.0 POSTINFLAMMATORY HYPERPIGMENTATION: ICD-10-CM

## 2023-12-29 DIAGNOSIS — D22.9 MELANOCYTIC NEVI, UNSPECIFIED: ICD-10-CM

## 2023-12-29 DIAGNOSIS — L28.0 LICHEN SIMPLEX CHRONICUS: ICD-10-CM

## 2023-12-29 PROCEDURE — 99204 OFFICE O/P NEW MOD 45 MIN: CPT

## 2023-12-29 RX ORDER — TACROLIMUS 1 MG/G
0.1 OINTMENT TOPICAL
Qty: 1 | Refills: 1 | Status: ACTIVE | COMMUNITY
Start: 2023-12-29 | End: 1900-01-01

## 2023-12-29 RX ORDER — HYDROCORTISONE 25 MG/G
2.5 OINTMENT TOPICAL
Qty: 1 | Refills: 2 | Status: ACTIVE | COMMUNITY
Start: 2023-12-29 | End: 1900-01-01

## 2023-12-29 NOTE — PHYSICAL EXAM
[FreeTextEntry3] : - hyperpigmented patches in the b/l axillae, lichenified plaques on the scrotum and penis  - uniform brown papules and macules on the chest back and abdomen

## 2023-12-29 NOTE — ASSESSMENT
[FreeTextEntry1] : 1. Pruritus, favor irritant dermatitis 2/2 irritation and friction with possible underlying neurogenic pruritus  2. Post-inflammatory hyperpigmentation and 3. Lichenification  Chronic, flaring  - Discussed condition, diagnosis and course  - Advised liberal use of OTC Aquaphor at night and in morning to affected areas as barrier protection  - Start hydrocortisone 2.5% ointment BID x 1-2 weeks on / 1 week off PRN flare, avoid face axilla groin. R/b/a topical steroids were discussed including but not limited to thinning of the skin, atrophy and dyspigmentation. - Start tacrolimus 0.1% ointment BID to affected areas on off-weeks as maintenance. SED including stinging with application  - If not improved in one month, can consider addition of gabapentin   3. Benign nevi, chest, back and abdomen  - Clinically benign appearing - ABCDEs of melanoma discussed, emphasized importance of sun protection - Reassurance provided   RTC 1 month

## 2023-12-29 NOTE — HISTORY OF PRESENT ILLNESS
[FreeTextEntry1] : NPV:  itchy rashes  [de-identified] : JARRETT BURROUGHS is a 43-year-old male new patient who presents for evaluation of the followin. Itching in various areas - scrotum, penis, groin, perianal area, axillae, posterior neck, ongoing x 4+ years. Using OTC hydrocortisone with temporary improvement. Sometimes notices the areas get red / brown in color

## 2024-01-03 ENCOUNTER — NON-APPOINTMENT (OUTPATIENT)
Age: 44
End: 2024-01-03

## 2024-01-04 LAB — DSDNA AB SER-ACNC: <12 IU/ML

## 2024-01-04 NOTE — PHYSICAL EXAM
[Outer Ear] : the ears and nose were normal in appearance [Nasal Cavity] : the nasal mucosa and septum were normal [Neck Cervical Mass (___cm)] : no neck mass was observed [FreeTextEntry1] : No synovitis or effusions, ROM diffusely intact, OA changes in b/l knees

## 2024-01-04 NOTE — ASSESSMENT
[FreeTextEntry1] : JARRETT BURROUGHS is a 43 year old man with --  # + GLADYS, now resolved prior mild proteinuria and intermittent microscopic hematuria, and + ESR. Paucity of CTD, SLE, Scleroderma sx at present. + FH of similar. More specific serological w/u negative, proteinuria within normal limits and small blood on UA but otherwise no active sediment.  # current sx appear moreso environmental exposure mediated and ?2/2 lack of using CPAP  - to be thorough, will repeat serologies - check CXR given pulm sx  - no role to repeat GLADYS in future as will likely always be positive unless new CTD/inflammatory sx  - reviewed overall low suspicion for a CTD process at present but also reviewed CTD sx to monitor for - return promptly if develops any  - advised mask and eye protection use at work given chemical exposures and more consistent use of CPAP   # intermittent vertigo  - meclizine PRN renewed, further refills to come from PMD or neuro if persistent   # low Vit D on recent PMD labs  - 50K rx supplementation qweekly x 12 weeks then OTC 1K daily for bone health   Will call with  to review w/u and determine if RTC needed - if specific serologies + would follow annually

## 2024-01-04 NOTE — CONSULT LETTER
[FreeTextEntry2] : Pérez Stringer  [FreeTextEntry3] : Zenaida Tran MD\par  Rheumatology\par  [facility] \par  \par  [address] [place] [address]\par  P: [phone]\par  F: [phone]\par

## 2024-01-04 NOTE — HISTORY OF PRESENT ILLNESS
[FreeTextEntry1] : JARRETT BURROUGHS is a 40 year old man who presents with + GLADYS and mild proteinuria seen on UA since 2015, and + father with GLADYS/proteinuria as well, here for rheumatologic eval.  SLE ROS negative for alopecia, sicca, salivary gland swelling, oral ulcers, malar rash, photosensitivity, SOB, chest pain, serositis, abd pain, dysuria, hematuria, rash, joint AM stiffness/synovitis, hematologic abnormalities, Raynauds, thrombotic events.  Scleroderma/CREST ROS negative for telangiectasias, sclerodactyly, tight skin, GERD/GI dysmotility.  Inflammatory arthritis ROS negative for symmetrical peripheral joint synovitis, prolonged AM stiffness, enthesitis, dactylitis, psoriasis/ rashes, eye inflammation, inflammatory low back pain, IBD.  Pt denies other medical issues - DM2, HTN, FH of renal dz  Labs - GLADYS 1:320 speckled, ESR 60-80s, thyroid Ab +, low proteinuria and fluctuating microscopic hematuria. Elevated AlkP. Negative - Hep B/C, TB, Smooth muscle Ab, mitochondrial Ab -----------  11/17/20 -- Since last visit, feels well, CTD ROS remains negative. Occasional foamy urine but no blood or other abnormalities. Present w/u -- + ESR/CRP, + scant UA activity with normal Pr/Cr ratio Negative - SLE specifics, scl-70, centromere  12/7/23 -- Last seen > 3 years ago, returns for re-eval for possible lupus. + mid back pain, hx of scoliosis, worse after work day - works in housekeeping at [employer]. Also + intermittent SOB/DECKER, no cough, no F/C, no recent infections, but does use chemical disinfectants without mask or goggles at work, also has CHRISTINE but doesn't like his CPAP so not using. + intermittent dizziness/vertigo, last activity a while back, meclizine was helpful. CTD ROS negative.

## 2024-02-02 ENCOUNTER — APPOINTMENT (OUTPATIENT)
Dept: DERMATOLOGY | Facility: CLINIC | Age: 44
End: 2024-02-02

## 2024-03-28 ENCOUNTER — APPOINTMENT (OUTPATIENT)
Dept: OTOLARYNGOLOGY | Facility: CLINIC | Age: 44
End: 2024-03-28
Payer: COMMERCIAL

## 2024-03-28 VITALS
DIASTOLIC BLOOD PRESSURE: 88 MMHG | OXYGEN SATURATION: 98 % | BODY MASS INDEX: 40.12 KG/M2 | WEIGHT: 218 LBS | HEART RATE: 74 BPM | RESPIRATION RATE: 16 BRPM | HEIGHT: 62 IN | SYSTOLIC BLOOD PRESSURE: 126 MMHG

## 2024-03-28 DIAGNOSIS — H61.23 IMPACTED CERUMEN, BILATERAL: ICD-10-CM

## 2024-03-28 DIAGNOSIS — R42 DIZZINESS AND GIDDINESS: ICD-10-CM

## 2024-03-28 PROCEDURE — 99204 OFFICE O/P NEW MOD 45 MIN: CPT | Mod: 25

## 2024-03-28 NOTE — CONSULT LETTER
[Dear  ___] : Dear  [unfilled], [Courtesy Letter:] : I had the pleasure of seeing your patient, [unfilled], in my office today. [Please see my note below.] : Please see my note below. [Sincerely,] : Sincerely, [FreeTextEntry2] : Pérez Stringer, DO   [FreeTextEntry3] : Adithya Solano, FRANCO, PAJamiC Sr. Physician Assistant, Otolaryngology at [facility] Adjunct Clinical Instructor, Department of Physician Assistant Studies, [address] [address] [DrNegro  ___] : Dr. SEGURA

## 2024-03-28 NOTE — PHYSICAL EXAM
[de-identified] : Excessive cerumen in both ear canals R>L - removed. [Midline] : trachea located in midline position [Normal] : no rashes [de-identified] : Joe-Hallpike was not performed since he mentioned that he has been asymptomatic for over a month.

## 2024-03-28 NOTE — HISTORY OF PRESENT ILLNESS
[de-identified] : Mr. BURROUGHS is a 44 year-old male who presents for an ear check and dizziness.  He reports that he had an event about 6 weeks ago when he felt like the room was spinning around him.  After experiencing it a second time, he went to the ED where he was prescribed Meclizine.  He also followed up with a cardiologist for a cardiac work-up which was normal.  He recommended to see an ENT.  He has not had any new vertigo episodes since.  Denies hearing loss, no pain, no nausea or vomiting. [Vertigo] : vertigo

## 2024-06-11 ENCOUNTER — APPOINTMENT (OUTPATIENT)
Dept: ULTRASOUND IMAGING | Facility: HOSPITAL | Age: 44
End: 2024-06-11

## 2024-08-01 ENCOUNTER — APPOINTMENT (OUTPATIENT)
Dept: DERMATOLOGY | Facility: CLINIC | Age: 44
End: 2024-08-01
Payer: COMMERCIAL

## 2024-08-01 DIAGNOSIS — L30.4 ERYTHEMA INTERTRIGO: ICD-10-CM

## 2024-08-01 DIAGNOSIS — L29.9 PRURITUS, UNSPECIFIED: ICD-10-CM

## 2024-08-01 DIAGNOSIS — L30.9 DERMATITIS, UNSPECIFIED: ICD-10-CM

## 2024-08-01 PROCEDURE — 99214 OFFICE O/P EST MOD 30 MIN: CPT

## 2024-08-01 RX ORDER — TRIAMCINOLONE ACETONIDE 1 MG/G
0.1 CREAM TOPICAL
Qty: 1 | Refills: 2 | Status: DISCONTINUED | COMMUNITY
Start: 2024-08-01 | End: 2024-08-01

## 2024-08-01 RX ORDER — TRIAMCINOLONE ACETONIDE 1 MG/G
0.1 OINTMENT TOPICAL
Qty: 1 | Refills: 4 | Status: ACTIVE | COMMUNITY
Start: 2024-08-01 | End: 1900-01-01

## 2024-08-01 NOTE — PHYSICAL EXAM
[FreeTextEntry3] : - red plaques in the b/l axillae (do not fluoresce on woods lamp exam), lichenified plaques on the scrotum and penis  - eczematous plaques and fissures on b/l hands

## 2024-08-01 NOTE — ASSESSMENT
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 SED including atrophy, dyspigmentation, telangiectasias, striae. Proper use reviewed including only using to affected area and avoidance of prolonged use. - For maintenance, start tacrolimus 0.1% ointment BID to AAs. SED including burning/stinging, apply vaseline first if so - Recommend barrier cream (zinc oxide) or zeasorb powder   # Hand dermatitis, chronic, flare with  # Xerosis - May apply triamcinolone 0.1% ointment BID to hands, occlude with cotton gloves at night - Encouraged to avoid fragrance in products (dry skincare handout and thorough counseling provided), moisturize frequently and after every hand wash (washes hands frequently as he is a ) - gentle hand care  RTC 2 mos per patient preference

## 2024-08-01 NOTE — HISTORY OF PRESENT ILLNESS
[FreeTextEntry1] : Fu dermatitis [de-identified] : JARRETT BURROUGHS is a 44-year-old male new patient who presents for evaluation of the following, LV 12/2023  1. Itching in various areas - scrotum, penis, groin, perianal area, axillae, posterior neck, ongoing x 4+ years. Using OTC hydrocortisone with temporary improvement. Sometimes notices the areas get red / brown in color  - At [facility], favored pruritus 2/2 ICD with possible underlying neurogenic component, PIH, and lichenification. Started HC 2.5% PRN, tacrolimus for maintenance, and 1 mo f/u- did not f/u  - TODAY 8/1/24: Reports HC 2.5% and tacrolimus have not been helping. HC initially helped for 2 years. Using daily for weeks at a time. Notes rash flaring in axillae.

## 2024-08-01 NOTE — HISTORY OF PRESENT ILLNESS
[FreeTextEntry1] : Fu dermatitis [de-identified] : JARRETT BURROUGHS is a 44-year-old male new patient who presents for evaluation of the following, LV 12/2023  1. Itching in various areas - scrotum, penis, groin, perianal area, axillae, posterior neck, ongoing x 4+ years. Using OTC hydrocortisone with temporary improvement. Sometimes notices the areas get red / brown in color  - At [facility], favored pruritus 2/2 ICD with possible underlying neurogenic component, PIH, and lichenification. Started HC 2.5% PRN, tacrolimus for maintenance, and 1 mo f/u- did not f/u  - TODAY 8/1/24: Reports HC 2.5% and tacrolimus have not been helping. HC initially helped for 2 years. Using daily for weeks at a time. Notes rash flaring in axillae.   3

## 2024-08-12 NOTE — ED PROVIDER NOTE - ACUTE OR EVOLVING MI?
Discharge medication review for this patient completed.  Pharmacist provided medication teaching for discharge with a focus on new medications/dose changes.  The discharge medication list was reviewed with Parents & Lorena and the following points were discussed, as applicable: Name, description, purpose, dose/strength, measurement of liquid medications, strategies for giving medications to children, common side effects, food/medications to avoid, action to be taken if dose is missed, when to call MD, and how to obtain refills.    All were/was engaged during teaching and verbalized understanding.    Medication(s) placed in medication room, awaiting discharge.    The following medications were discussed:  Current Discharge Medication List        START taking these medications    Details   nadolol (CORGARD) 80 MG tablet Take 1 tablet (80 mg) by mouth daily  Qty: 30 tablet, Refills: 0    Associated Diagnoses: Torsades de pointes (H)           CONTINUE these medications which have NOT CHANGED    Details   childrens multivitamin chewable tablet Take 1 tablet by mouth daily ([employer])           STOP taking these medications       sertraline (ZOLOFT) 25 MG tablet Comments:   Reason for Stopping:                 
no

## 2024-09-12 NOTE — ED ADULT NURSE NOTE - NSFALLRSKASSESASSIST_ED_ALL_ED
no Patient Specific Otc Recommendations (Will Not Stick From Patient To Patient): Pt reports using Differin gel. Advised pt to use on affected areas of the back. Recommended washing back with Panoxyl wash. Detail Level: Generalized

## 2024-10-15 ENCOUNTER — APPOINTMENT (OUTPATIENT)
Dept: DERMATOLOGY | Facility: CLINIC | Age: 44
End: 2024-10-15
Payer: COMMERCIAL

## 2024-10-15 VITALS — HEIGHT: 62 IN | WEIGHT: 218 LBS | BODY MASS INDEX: 40.12 KG/M2

## 2024-10-15 DIAGNOSIS — L30.9 DERMATITIS, UNSPECIFIED: ICD-10-CM

## 2024-10-15 PROCEDURE — 99214 OFFICE O/P EST MOD 30 MIN: CPT

## 2024-10-15 RX ORDER — CLOBETASOL PROPIONATE 0.5 MG/G
0.05 OINTMENT TOPICAL
Qty: 1 | Refills: 4 | Status: ACTIVE | COMMUNITY
Start: 2024-10-15 | End: 1900-01-01

## 2024-11-05 ENCOUNTER — APPOINTMENT (OUTPATIENT)
Dept: RHEUMATOLOGY | Facility: CLINIC | Age: 44
End: 2024-11-05

## 2024-12-19 ENCOUNTER — APPOINTMENT (OUTPATIENT)
Dept: DERMATOLOGY | Facility: CLINIC | Age: 44
End: 2024-12-19

## 2024-12-19 VITALS — HEIGHT: 62 IN | BODY MASS INDEX: 40.12 KG/M2 | WEIGHT: 218 LBS

## 2025-01-24 ENCOUNTER — RX RENEWAL (OUTPATIENT)
Age: 45
End: 2025-01-24